# Patient Record
Sex: MALE | Race: WHITE | NOT HISPANIC OR LATINO | Employment: OTHER | URBAN - METROPOLITAN AREA
[De-identification: names, ages, dates, MRNs, and addresses within clinical notes are randomized per-mention and may not be internally consistent; named-entity substitution may affect disease eponyms.]

---

## 2017-08-17 ENCOUNTER — HOSPITAL ENCOUNTER (OUTPATIENT)
Dept: GENERAL RADIOLOGY | Facility: HOSPITAL | Age: 72
Discharge: HOME | End: 2017-08-17
Admitting: NURSE PRACTITIONER

## 2017-08-17 ENCOUNTER — OFFICE VISIT (OUTPATIENT)
Dept: NEUROSURGERY | Facility: CLINIC | Age: 72
End: 2017-08-17

## 2017-08-17 VITALS
BODY MASS INDEX: 34.07 KG/M2 | HEART RATE: 86 BPM | RESPIRATION RATE: 20 BRPM | WEIGHT: 274 LBS | HEIGHT: 75 IN | DIASTOLIC BLOOD PRESSURE: 78 MMHG | SYSTOLIC BLOOD PRESSURE: 140 MMHG

## 2017-08-17 DIAGNOSIS — S32.000A CLOSED COMPRESSION FRACTURE OF LUMBAR VERTEBRA, INITIAL ENCOUNTER (HCC): Primary | ICD-10-CM

## 2017-08-17 DIAGNOSIS — S32.000A CLOSED COMPRESSION FRACTURE OF LUMBAR VERTEBRA, INITIAL ENCOUNTER (HCC): ICD-10-CM

## 2017-08-17 DIAGNOSIS — S32.2XXK: ICD-10-CM

## 2017-08-17 PROCEDURE — 72100 X-RAY EXAM L-S SPINE 2/3 VWS: CPT

## 2017-08-17 PROCEDURE — 99203 OFFICE O/P NEW LOW 30 MIN: CPT | Performed by: NURSE PRACTITIONER

## 2017-08-17 NOTE — PROGRESS NOTES
Subjective   Patient ID: Kennedy Lin is a 71 y.o. male is being seen for consultation today at the request of Kelsie Rodriguez MD for L2 compression fracture. Patient's last imaging was a lumbar MRI 8/7 and presents unaccompanied.     History of Present Illness   Patient presents for evaluation and treatment recommendations of L2 fracture.  Patient had a fall on August 6, 2017 when he fell backwards off of a 6-7 foot platform.  He landed onto his feet and then onto his buttocks.  He had discomfort immediately, but not severe pain.  He took some Motrin.  The next day he was extremely stiff and in pain and unable to move easily.  He was seen in the emergency room and had relief of his pain with Dilaudid.  He was sent home with Norco and muscle relaxant.  Neither of which have given him much relief.  The significant pain is in his tailbone and his dramatically worsened by sitting.  It is relieved by standing.  H he has minimal low back pain.  He has a stiffness in his low back that is worse in the morning after lying down for long periods of time.  He denies any associated numbness, tingling, incontinence.  He has been wearing his lumbar brace given to him in the emergency room but not all of the time.    The following portions of the patient's history were reviewed and updated as appropriate: allergies, current medications, past family history, past medical history, past social history, past surgical history and problem list.    Review of Systems   Constitutional: Negative for fever.   HENT: Negative for sore throat, tinnitus and trouble swallowing.    Eyes: Negative for visual disturbance.   Respiratory: Negative for cough, shortness of breath and wheezing.    Cardiovascular: Negative for chest pain and palpitations.   Gastrointestinal: Negative for abdominal pain, nausea and vomiting.   Genitourinary: Negative for difficulty urinating and enuresis.   Musculoskeletal: Positive for back pain (coccyx pain).  Negative for gait problem.   Skin: Negative for rash.   Neurological: Negative for weakness and numbness.   Psychiatric/Behavioral: Negative for sleep disturbance.       Objective   Physical Exam   Constitutional: He is oriented to person, place, and time. He appears well-developed and well-nourished.   Large body habitus   Pulmonary/Chest: Effort normal.   Musculoskeletal:        Lumbar back: He exhibits bony tenderness (none at lumbar spine.  There is tenderness in the sacral region). He exhibits normal range of motion and no tenderness. Lacerations: with sitting.   Negative straight leg raise bilaterally    Wearing LSO brace   Neurological: He is alert and oriented to person, place, and time. He has normal strength. Gait normal.   Reflex Scores:       Patellar reflexes are 2+ on the right side and 2+ on the left side.       Achilles reflexes are 0 on the right side and 0 on the left side.  Psychiatric: He has a normal mood and affect.   pacing   Vitals reviewed.    Neurologic Exam     Mental Status   Oriented to person, place, and time.   Level of consciousness: alert  Knowledge: good.   Normal comprehension.     Motor Exam   Muscle bulk: increased  Overall muscle tone: normal    Strength   Strength 5/5 throughout.     Sensory Exam   Right leg light touch: normal  Left leg light touch: normal    Gait, Coordination, and Reflexes     Gait  Gait: normal    Reflexes   Right patellar: 2+  Left patellar: 2+  Right achilles: 0  Left achilles: 0       Able to heel and toe walk.  Able to squat to stand unassisted       Assessment/Plan   Independent Review of Radiographic Studies:    CT scan of the lumbar spine from [facility] dated August 7, 2017 was reviewed.  There is a superior endplate fracture of L2 approximately 20% loss of body height.  No retropulsion.  There is also fracture involving sacrococcygeal junction.    Medical Decision Making:    Patient with L2 and sacrococcygeal fractures after a fall  approximately 10 days ago.  The majority of his pain is in his coccyx.  He has a little pain but really just stiffness in his low back.  He has no tenderness to palpation.  He has no neurologic red flags.  I've asked him to repeat a lumbar x-ray today and I will review that as well as his CT scan with Dr. Sweeney tomorrow morning.  I doubt that there is any surgical intervention for his coccyx fracture, but we will refer to orthopedic surgery at Dr. Sweeney feels it is necessary.  With regard to the L2 fracture, he is not a surgical candidate for many reasons.  One being that he really has no significant pain in this area and 2 because this is a traumatic injury and does not appear to be related to osteoporosis.  His insurance is unlikely to cover this procedure in his case.  He should remain in the brace at all times except for when lying down at a 45° angle or less.  This was discussed with him.  He should not do any lifting pushing pulling greater than 20 pounds.  He should avoid bending and twisting.  We will see him back in one month with repeat x-rays.    Plan: Lumbar x-rays today.  Call patient with results.  Plan to see him in follow-up in one month with lumbar x-rays.  Continue brace.    Kennedy was seen today for back pain.    Diagnoses and all orders for this visit:    Closed compression fracture of lumbar vertebra, initial encounter  -     XR Spine Lumbar 2 or 3 View; Future    Closed fracture of coccyx with nonunion      Return in about 1 month (around 9/17/2017) for Follow-up with NP, with imaging.         Addendum: L2 fracture stable. Dr. Ma believes there to also be a stable L1 fracture. Dr. Sweeney states no particular treatment for coccyx fracture. If his pain persists- consider ortho referral by PCP or us if still present at f/u visit. Patient advised.

## 2017-09-15 DIAGNOSIS — S32.000A CLOSED COMPRESSION FRACTURE OF LUMBAR VERTEBRA, INITIAL ENCOUNTER (HCC): Primary | ICD-10-CM

## 2017-09-27 ENCOUNTER — OFFICE VISIT (OUTPATIENT)
Dept: NEUROSURGERY | Facility: CLINIC | Age: 72
End: 2017-09-27

## 2017-09-27 ENCOUNTER — HOSPITAL ENCOUNTER (OUTPATIENT)
Dept: GENERAL RADIOLOGY | Facility: HOSPITAL | Age: 72
Discharge: HOME | End: 2017-09-27
Admitting: NURSE PRACTITIONER

## 2017-09-27 VITALS
RESPIRATION RATE: 18 BRPM | HEART RATE: 84 BPM | DIASTOLIC BLOOD PRESSURE: 84 MMHG | WEIGHT: 262 LBS | BODY MASS INDEX: 32.58 KG/M2 | HEIGHT: 75 IN | SYSTOLIC BLOOD PRESSURE: 142 MMHG

## 2017-09-27 DIAGNOSIS — S32.000G: Primary | ICD-10-CM

## 2017-09-27 DIAGNOSIS — S32.000A CLOSED COMPRESSION FRACTURE OF LUMBAR VERTEBRA, INITIAL ENCOUNTER (HCC): ICD-10-CM

## 2017-09-27 PROCEDURE — 99213 OFFICE O/P EST LOW 20 MIN: CPT | Performed by: NURSE PRACTITIONER

## 2017-09-27 PROCEDURE — 72100 X-RAY EXAM L-S SPINE 2/3 VWS: CPT

## 2017-09-27 RX ORDER — MELOXICAM 15 MG/1
TABLET ORAL
COMMUNITY
Start: 2017-09-08 | End: 2021-06-08 | Stop reason: ALTCHOICE

## 2017-09-27 NOTE — PROGRESS NOTES
Subjective   Patient ID: Kennedy Lin is a 72 y.o. male is here today for follow-up for back pain. Patient presents unaccompanied.     History of Present Illness   Patient resents for follow-up of L2 vertebral compression fracture.  This was sustained during a fall in August 2017.  He's been monitored with serial imaging and treated with conservative bracing.  At the time of the fall he also sustained a sacral coccygeal fracture. He has had improvement in pain with new coccyx pillow. He has a bit of soreness in lower back but mainly muscular.  No leg pain or numbness. He states that he is wearing his brace.     The following portions of the patient's history were reviewed and updated as appropriate: allergies, current medications and problem list.    Review of Systems   Musculoskeletal: Positive for back pain (improved).   Neurological: Negative for weakness and numbness.   Psychiatric/Behavioral: Negative for sleep disturbance.       Objective   Physical Exam   Constitutional: He appears well-developed and well-nourished.   Musculoskeletal:        Lumbar back: He exhibits no tenderness, no bony tenderness and no deformity.   LSO brace   Neurological: He has normal strength. Gait normal.   Vitals reviewed.    Neurologic Exam     Motor Exam   Muscle bulk: normal  Right leg tone: normal  Left leg tone: normal    Strength   Strength 5/5 throughout.     Gait, Coordination, and Reflexes     Gait  Gait: normal      Assessment/Plan   Independent Review of Radiographic Studies:    Lumbar x-rays from [facility] dated September 27, 2017 are compared with prior x-ray on August 17, 2017.  There does appear to be progression of the L2 compression deformity as compared with the prior imaging.    Medical Decision Making:    Patient presents for ongoing follow-up of L2 compression fracture.  The recent x-ray shows some increased loss of height, but the patient is improving with regard to back discomfort.  He has  no neurologic radicular symptoms or any abnormalities on exam.  I recommended that he continue in his brace at this time as well as continue activity restrictions including no lifting pushing pulling greater than 15 pounds and no bending or twisting.  We will repeat his x-ray in one month.  If his x-ray stabilized then we will consider weaning the brace that time.    Plan: Return to office one month with lumbar x-rays.  Continue in brace.    Kennedy was seen today for back pain.    Diagnoses and all orders for this visit:    Closed compression fracture of lumbar vertebra, with delayed healing, subsequent encounter  -     XR Spine Lumbar 2 or 3 View; Future      Return in about 1 month (around 10/27/2017).

## 2017-11-09 ENCOUNTER — HOSPITAL ENCOUNTER (OUTPATIENT)
Dept: GENERAL RADIOLOGY | Facility: HOSPITAL | Age: 72
Discharge: HOME | End: 2017-11-09
Admitting: NURSE PRACTITIONER

## 2017-11-09 ENCOUNTER — OFFICE VISIT (OUTPATIENT)
Dept: NEUROSURGERY | Facility: CLINIC | Age: 72
End: 2017-11-09

## 2017-11-09 VITALS
SYSTOLIC BLOOD PRESSURE: 132 MMHG | DIASTOLIC BLOOD PRESSURE: 86 MMHG | HEART RATE: 82 BPM | BODY MASS INDEX: 32.95 KG/M2 | HEIGHT: 75 IN | RESPIRATION RATE: 16 BRPM | WEIGHT: 265 LBS

## 2017-11-09 DIAGNOSIS — S32.000G: ICD-10-CM

## 2017-11-09 DIAGNOSIS — S32.020D CLOSED COMPRESSION FRACTURE OF L2 LUMBAR VERTEBRA WITH ROUTINE HEALING, SUBSEQUENT ENCOUNTER: Primary | ICD-10-CM

## 2017-11-09 PROCEDURE — 99213 OFFICE O/P EST LOW 20 MIN: CPT | Performed by: NURSE PRACTITIONER

## 2017-11-09 PROCEDURE — 72100 X-RAY EXAM L-S SPINE 2/3 VWS: CPT

## 2017-11-09 RX ORDER — GLIPIZIDE 10 MG/1
TABLET, FILM COATED, EXTENDED RELEASE ORAL
COMMUNITY
Start: 2017-11-07 | End: 2020-09-14

## 2017-11-09 NOTE — PROGRESS NOTES
Subjective   Patient ID: Kennedy Lin is a 72 y.o. male is here today for follow-up L2 fracture. Patient presents unaccompanied.     History of Present Illness   Patient presents for ongoing follow-up of L2 vertebral compression fracture.  He has been in a brace since fall in August 2017. He has had intermittent right low back pain since his last visit. He denies leg pain. He states he has been compliant with brace and restrictions.     The following portions of the patient's history were reviewed and updated as appropriate: allergies, current medications and problem list.    Review of Systems   Musculoskeletal: Positive for back pain.   Neurological: Negative for weakness and numbness.   Psychiatric/Behavioral: Positive for sleep disturbance.       Objective   Physical Exam   Constitutional: He appears well-developed and well-nourished.   Pulmonary/Chest: Effort normal.   Musculoskeletal:        Lumbar back: He exhibits tenderness (left lateral low back.). He exhibits no bony tenderness and no deformity.   LSO brace   Neurological: He is alert. He has normal strength. Gait normal.   Reflex Scores:       Patellar reflexes are 1+ on the right side and 1+ on the left side.       Achilles reflexes are 0 on the right side and 0 on the left side.  Skin: Skin is warm and dry.   Psychiatric: He has a normal mood and affect. His behavior is normal.   Vitals reviewed.    Neurologic Exam     Mental Status   Level of consciousness: alert    Motor Exam   Muscle bulk: normal  Right leg tone: normal  Left leg tone: normal    Strength   Strength 5/5 throughout.     Gait, Coordination, and Reflexes     Gait  Gait: normal    Reflexes   Right patellar: 1+  Left patellar: 1+  Right achilles: 0  Left achilles: 0      Assessment/Plan   Independent Review of Radiographic Studies:    Lumbar x-rays from [facility] dated November 9, 2017 were reviewed.  These reveal stability of the L2 compression fracture as compared with  the prior imaging.    Medical Decision Making:    Patient presents for ongoing follow-up of L2 compression fracture.  This is stable on recent imaging.  No further compression notice.  Patient states he's been compliant with his brace.  He does have some intermittent left low back discomfort that is in the musculature.  There is no tenderness to palpation or percussion over the midline spinous processes.  There are no neurologic red flags on exam.    Patient may begin to wean his brace.  He should slowly increase his activity.  He should avoid any heavy lifting until he is out of his brace with no significant worsening in back discomfort.  He should use heat and NSAIDs for his current discomfort.  He should contact us if he has any acute changes in his back pain.  Return to office when necessary.    Plan: Return to office when necessary.    Kennedy was seen today for back pain.    Diagnoses and all orders for this visit:    Closed compression fracture of L2 lumbar vertebra with routine healing, subsequent encounter      Return if symptoms worsen or fail to improve.

## 2020-09-14 RX ORDER — TELMISARTAN 80 MG/1
TABLET ORAL
Qty: 90 TABLET | Refills: 0 | Status: SHIPPED | OUTPATIENT
Start: 2020-09-14 | End: 2020-11-04 | Stop reason: SDUPTHER

## 2020-09-14 RX ORDER — GLIPIZIDE 10 MG/1
TABLET, FILM COATED, EXTENDED RELEASE ORAL
Qty: 90 TABLET | Refills: 0 | Status: SHIPPED | OUTPATIENT
Start: 2020-09-14 | End: 2020-11-04 | Stop reason: SDUPTHER

## 2020-10-09 PROBLEM — E66.812 CLASS 2 SEVERE OBESITY DUE TO EXCESS CALORIES WITH SERIOUS COMORBIDITY AND BODY MASS INDEX (BMI) OF 35.0 TO 35.9 IN ADULT: Status: ACTIVE | Noted: 2020-10-09

## 2020-10-09 PROBLEM — E55.9 VITAMIN D DEFICIENCY: Status: ACTIVE | Noted: 2020-10-09

## 2020-10-09 PROBLEM — Z72.0 CHEWS TOBACCO: Status: ACTIVE | Noted: 2020-10-09

## 2020-10-09 PROBLEM — J30.1 SEASONAL ALLERGIC RHINITIS DUE TO POLLEN: Status: ACTIVE | Noted: 2020-10-09

## 2020-10-09 PROBLEM — E66.01 CLASS 2 SEVERE OBESITY DUE TO EXCESS CALORIES WITH SERIOUS COMORBIDITY AND BODY MASS INDEX (BMI) OF 35.0 TO 35.9 IN ADULT: Status: ACTIVE | Noted: 2020-10-09

## 2020-10-09 PROBLEM — Z87.81 HISTORY OF COMPRESSION FRACTURE OF SPINE: Status: ACTIVE | Noted: 2020-10-09

## 2020-10-09 PROBLEM — I10 BENIGN ESSENTIAL HYPERTENSION: Status: ACTIVE | Noted: 2020-10-09

## 2020-10-09 PROBLEM — Z85.820 HISTORY OF MELANOMA: Status: ACTIVE | Noted: 2020-10-09

## 2020-10-09 PROBLEM — E11.9 TYPE 2 DIABETES MELLITUS WITHOUT COMPLICATION, WITHOUT LONG-TERM CURRENT USE OF INSULIN: Status: ACTIVE | Noted: 2020-10-09

## 2020-10-20 PROBLEM — E78.2 MIXED HYPERLIPIDEMIA: Status: ACTIVE | Noted: 2020-10-20

## 2020-10-28 ENCOUNTER — TELEPHONE (OUTPATIENT)
Dept: INTERNAL MEDICINE | Facility: CLINIC | Age: 75
End: 2020-10-28

## 2020-10-28 NOTE — TELEPHONE ENCOUNTER
PT CALLED STATING HE HAS AN APPT TOMORROW 10/29 FOR A WELLNESS EXAM AND HAS NOT RECEIVED BLOOD WORK BEFORE APPT. DOES PT NEED TO RECEIVE BLOOD WORK FIRST AND RESCHEDULE..     PLEASE ADVISE     LOST CONNECTION WITH PT BEFORE ABLE TO RECEIVE CALL BACK NUMBER

## 2020-11-03 ENCOUNTER — TELEPHONE (OUTPATIENT)
Dept: INTERNAL MEDICINE | Facility: CLINIC | Age: 75
End: 2020-11-03

## 2020-11-03 ENCOUNTER — OFFICE VISIT (OUTPATIENT)
Dept: INTERNAL MEDICINE | Facility: CLINIC | Age: 75
End: 2020-11-03

## 2020-11-03 VITALS
HEIGHT: 75 IN | OXYGEN SATURATION: 97 % | TEMPERATURE: 98.4 F | BODY MASS INDEX: 35.19 KG/M2 | DIASTOLIC BLOOD PRESSURE: 78 MMHG | HEART RATE: 70 BPM | WEIGHT: 283 LBS | RESPIRATION RATE: 16 BRPM | SYSTOLIC BLOOD PRESSURE: 130 MMHG

## 2020-11-03 DIAGNOSIS — E55.9 VITAMIN D DEFICIENCY: ICD-10-CM

## 2020-11-03 DIAGNOSIS — J30.1 SEASONAL ALLERGIC RHINITIS DUE TO POLLEN: ICD-10-CM

## 2020-11-03 DIAGNOSIS — E78.2 MIXED HYPERLIPIDEMIA: ICD-10-CM

## 2020-11-03 DIAGNOSIS — Z85.820 HISTORY OF MELANOMA: ICD-10-CM

## 2020-11-03 DIAGNOSIS — E66.01 CLASS 2 SEVERE OBESITY DUE TO EXCESS CALORIES WITH SERIOUS COMORBIDITY AND BODY MASS INDEX (BMI) OF 35.0 TO 35.9 IN ADULT (HCC): ICD-10-CM

## 2020-11-03 DIAGNOSIS — E11.9 TYPE 2 DIABETES MELLITUS WITHOUT COMPLICATION, WITHOUT LONG-TERM CURRENT USE OF INSULIN (HCC): ICD-10-CM

## 2020-11-03 DIAGNOSIS — Z72.0 CHEWS TOBACCO: ICD-10-CM

## 2020-11-03 DIAGNOSIS — I10 BENIGN ESSENTIAL HYPERTENSION: Primary | ICD-10-CM

## 2020-11-03 PROCEDURE — G0439 PPPS, SUBSEQ VISIT: HCPCS | Performed by: INTERNAL MEDICINE

## 2020-11-03 PROCEDURE — 99214 OFFICE O/P EST MOD 30 MIN: CPT | Performed by: INTERNAL MEDICINE

## 2020-11-03 PROCEDURE — 96160 PT-FOCUSED HLTH RISK ASSMT: CPT | Performed by: INTERNAL MEDICINE

## 2020-11-03 NOTE — PATIENT INSTRUCTIONS
-Diet, low carbohydrate, high-protein (diets that are low in carbohydrates and high in protein are very popular for weight loss)  -Exercise 30 to 45 minutes, 4-5 times per week  -Giving encouragement to exercise (we encourage all of her patients to exercise regularly 30 minutes of exercise 5 or more days per week)  -Special dietary instruction-we recommend you modify your diet to achieve and maintain a healthy weight.  Being overweight may increase your risk for developing health problems such as diabetes, heart disease and cancer.  Avoid high fat foods and eat a balanced diet rich in fruits and vegetables.  The combination of a reduced calorie diet and increased physical activity is recommended.    Diabetes handout:    Recommended fasting blood glucose     Recommend glucose 2 hours after meals less than 180    Symptoms of low blood glucose:  Confusion Dizziness feeling shaky Hunger Headache   Irritability Sweating Trembling Weakness Anxiety  Pale skin pounding heart    If your blood glucose is low, follow the steps below:  Follow the 15-15 rule: Eat or drink something from the list below equal to 15 g of carbohydrates  Rest for 15 minutes, then recheck your blood glucose.  If it is still low, (below 70), repeat step 1 above.  If your next meal is more than 1 hour away, you will need to eat 1 carbohydrate choice as a snack to keep your blood glucose from going low again.  If you cannot figure out why you have low blood glucose, call the office or go to the emergency room, as your medication may need to be adjusted.  Always carry something with you to treat an insulin reaction.  Use food from the list below.    Foods equal to 1 carbohydrate choice (15 g of carbohydrates):  3 glucose tablets or 4 dextrose tablets  4 ounces of fruit juice  5 to 6 ounces (approximately one half can) of regular soda such as a Coke or Pepsi  7-8 gummy or regular lifesavers  1 tablespoon of sugar or jelly.    If you have type 1  diabetes and you do not take care of low blood glucose, you may pass out.  If you do, a drug called glucagon should be injected into your skin, like you do with insulin.  This can be done by a family member or a friend who has been taught how to do it.  Since glucagon may cause you to vomit, you should be placed on your side when the injection is given.  If no one knows how to give the injection, you should be taken to the hospital.    You should awaken 10 minutes after the glucagon is injected.  If you do not, you should be taken at once to the hospital.    Cardiovascular risk in diabetic patients.    The presence of diabetes is associated with increased cardiovascular risk, particularly among women.  Patients with diabetes have a 2-4 times increased risk for cardiovascular disease, with more than two thirds of patients with diabetes eventually dying of heart disease.  The risk for stroke is 1.8-1.6 fold in diabetics.  Patients with diabetes are more likely to have undiagnosed coronary artery disease and have worse outcomes when hospitalized for other cardiovascular conditions, such as heart failure.    Hemoglobin A1c    Your blood sugar levels vary from minute to minute an hour to hour.  That is why you must test your blood sugar at home at certain times of the day.  You may test before and after meals, before and after exercise and whenever there are interruptions to your usual daily routine (travel, illness, stress, etc.).  However, you cannot test your blood sugar all of the time!  So, how can you know what your overall level of control is?    The hemoglobin A1c (HbA1C) test is like a batting average.  For example, kristopher Bruno was able to hit home runs sometimes and strike out at others.  However, his overall batting average showed that he was a great hitter most of the time!  Your hemoglobin A1c let you know how well you have controlled your blood sugar on the average for the last 3 months.  The chart below  compares the results of a hemoglobin A1c to an average daily blood sugar range.    Hemoglobin A1c      average blood sugar range for the last 3 months.  4.0 to 6.0%        mg/deciliter-normal  6.1 to 7.0%       120-150 mg/deciliter  7.1 to 8.0%       151-180 mg/deciliter  8.1 to 9.0%       181-210 mg/deciliter  9.1 to 10%       211-240 mg/deciliter  10.1 to 11%       241-270 mg/deciliter  11.1 to 12%       271-300 mg/deciliter  12.1% to 13%       301-330 mg/deciliter  13.1% to 14%       331-360 mg/deciliter  Greater than 14%      greater than 360 mg/deciliter    How does hemoglobin A1c work?  The hemoglobin A1c measures the amount of sugar that attaches to protein in your red blood cells.  Your red blood cells live for ~2 to 3 months, so this test shows your average blood sugar levels during this time.  The greater the amount of sugar in your blood and the longer it is high, the more sugar that will attached to those red blood cells.

## 2020-11-03 NOTE — TELEPHONE ENCOUNTER
glipizide (GLUCOTROL XL) 10 MG 24 hr tablet    telmisartan (MICARDIS) 80 MG tablet    ATORVASTATIN 10 MG/ 1 A DAY     HE IS ALSO TAKING COQ10    PLEASE ADVISE THESE ARE THE MEDICATIONS HE IS ON.    [phone]

## 2020-11-03 NOTE — PROGRESS NOTES
The ABCs of the Annual Wellness Visit  Subsequent Medicare Wellness Visit    Chief Complaint   Patient presents with   • Medicare Wellness-subsequent       Subjective   History of Present Illness:  Kennedy Lin is a 75 y.o. male who presents for a Subsequent Medicare Wellness Visit.  This is a 75-year-old gentleman with a past medical history significant for benign essential hypertension, diabetes mellitus type 2, hyperlipidemia, history of melanoma, vitamin D deficiency who presents for a wellness exam as well as a follow-up on labs and medications.  The patient is currently not experiencing any side effects or problems with his current medication.  Patient is getting regular exercise playing golf at least 1 time per week but no scheduled exercise.  He currently does not check blood glucose levels.  He is concerned about an area on the right side of his abdomen and was unsure if this could possibly be a hernia, he states that he has noted it for probably the last 4 to 6 weeks and has some mild discomfort in that region when he is lifting objects.  He denies any chest pain, no shortness of air or heart palpitations, no heartburn or reflux symptoms.  No other voiced complaints.  The patient has not been hospitalized in the last year.  He does not take a daily aspirin.  He declines discussion regarding the advanced directive.  The patient is unsure when he last had a colonoscopy completed.  He only drinks alcohol socially and this is a rare.  No concerns about falling.  Glaucoma screen was completed in March of this year.  He has had an influenza vaccination.  He has had some mild issues with hearing but denies any urinary incontinence or depression.  He does chew tobacco and is aware of the increased risk.    HEALTH RISK ASSESSMENT    Recent Hospitalizations:  No hospitalization(s) within the last year.    Current Medical Providers:  Patient Care Team:  Kelsie Rodriguez MD as PCP - General (Internal  Medicine)    Smoking Status:  Social History     Tobacco Use   Smoking Status Never Smoker   Smokeless Tobacco Never Used       Alcohol Consumption:  Social History     Substance and Sexual Activity   Alcohol Use No       Depression Screen:   PHQ-2/PHQ-9 Depression Screening 11/3/2020   Little interest or pleasure in doing things 0   Feeling down, depressed, or hopeless 0   Trouble falling or staying asleep, or sleeping too much 0   Feeling tired or having little energy 0   Poor appetite or overeating 0   Feeling bad about yourself - or that you are a failure or have let yourself or your family down 0   Trouble concentrating on things, such as reading the newspaper or watching television 0   Moving or speaking so slowly that other people could have noticed. Or the opposite - being so fidgety or restless that you have been moving around a lot more than usual 0   Thoughts that you would be better off dead, or of hurting yourself in some way 0   Total Score 0   If you checked off any problems, how difficult have these problems made it for you to do your work, take care of things at home, or get along with other people? Not difficult at all       Fall Risk Screen:  STEADI Fall Risk Assessment was completed, and patient is at LOW risk for falls.Assessment completed on:11/3/2020    Health Habits and Functional and Cognitive Screening:  Functional & Cognitive Status 11/3/2020   Do you have difficulty preparing food and eating? No   Do you have difficulty bathing yourself, getting dressed or grooming yourself? No   Do you have difficulty using the toilet? No   Do you have difficulty moving around from place to place? No   Do you have trouble with steps or getting out of a bed or a chair? No   Current Diet Well Balanced Diet   Dental Exam Up to date   Eye Exam Up to date   Exercise (times per week) 3 times per week   Current Exercises Include Walking        Exercise Comment GOLF   Do you need help using the phone?  No   Are  you deaf or do you have serious difficulty hearing?  No   Do you need help with transportation? No   Do you need help shopping? No   Do you need help preparing meals?  No   Do you need help with housework?  No   Do you need help with laundry? No   Do you need help taking your medications? No   Do you need help managing money? No   Do you ever drive or ride in a car without wearing a seat belt? No   Have you felt unusual stress, anger or loneliness in the last month? No   Who do you live with? Spouse   If you need help, do you have trouble finding someone available to you? No   Have you been bothered in the last four weeks by sexual problems? No   Do you have difficulty concentrating, remembering or making decisions? No         Does the patient have evidence of cognitive impairment? No    Asprin use counseling:Does not need ASA (and currently is not on it)    Age-appropriate Screening Schedule:  Refer to the list below for future screening recommendations based on patient's age, sex and/or medical conditions. Orders for these recommended tests are listed in the plan section. The patient has been provided with a written plan.    Health Maintenance   Topic Date Due   • URINE MICROALBUMIN  1945   • COLONOSCOPY  1945   • TDAP/TD VACCINES (1 - Tdap) 08/23/1965   • ZOSTER VACCINE (1 of 2) 08/23/1996   • DIABETIC FOOT EXAM  08/17/2017   • DIABETIC EYE EXAM  08/17/2017   • INFLUENZA VACCINE  08/01/2020   • HEMOGLOBIN A1C  04/30/2021   • LIPID PANEL  10/30/2021          The following portions of the patient's history were reviewed and updated as appropriate: allergies, current medications, past family history, past medical history, past social history, past surgical history and problem list.    Outpatient Medications Prior to Visit   Medication Sig Dispense Refill   • glipizide (GLUCOTROL XL) 10 MG 24 hr tablet TAKE ONE TABLET BY MOUTH DAILY 90 tablet 0   • meloxicam (MOBIC) 15 MG tablet      • telmisartan  "(MICARDIS) 80 MG tablet TAKE ONE TABLET BY MOUTH DAILY 90 tablet 0   • VALSARTAN PO Take  by mouth.       No facility-administered medications prior to visit.        Patient Active Problem List   Diagnosis   • Closed compression fracture of lumbar vertebra (CMS/HCC)   • Closed fracture of coccyx with nonunion   • Benign essential hypertension   • Type 2 diabetes mellitus without complication, without long-term current use of insulin (CMS/[facility])   • History of melanoma   • Seasonal allergic rhinitis due to pollen   • History of compression fracture of spine   • Chews tobacco   • Class 2 severe obesity due to excess calories with serious comorbidity and body mass index (BMI) of 35.0 to 35.9 in adult (CMS/[facility])   • Vitamin D deficiency   • Mixed hyperlipidemia       Advanced Care Planning:  ACP discussion was declined by the patient. Patient does not have an advance directive, declines further assistance.    Review of Systems    Compared to one year ago, the patient feels his physical health is the same.  Compared to one year ago, the patient feels his mental health is the same.    Reviewed chart for potential of high risk medication in the elderly: yes  Reviewed chart for potential of harmful drug interactions in the elderly:yes    Objective         Vitals:    11/03/20 1029   BP: 130/78   BP Location: Left arm   Patient Position: Sitting   Cuff Size: Large Adult   Pulse: 70   Resp: 16   Temp: 98.4 °F (36.9 °C)   TempSrc: Temporal   SpO2: 97%   Weight: 128 kg (283 lb)   Height: 190.5 cm (75\")       Body mass index is 35.37 kg/m².  Discussed the patient's BMI with him. The BMI is above average; BMI management plan is completed.    Physical Exam  Constitutional:  The patient appears well-developed and well-nourished and is in no acute distress.  Head and Face:  Head and face are symmetric, normocephalic, and atraumatic.  Ears, Nose, Mouth and Throat:  Lips, teeth, and gums appear healthy with good dentition.  The oropharynx " is normal with no erythema, edema, exudate or lesions.  Neck:  The neck was normal in appearance and supple.  The trachea was midline.  Exam of the thyroid reveals no thyromegaly or masses.  Pulmonary:  Respiratory effort is normal.  Lungs are clear to auscultation bilaterally.  Cardiovascular:  The heart rate was normal.  The rhythm was regular.  Heart sounds show a normal S1, a normal S2, no gallops, rubs or murmurs.  Pedal pulses are +2/4 bilaterally.  Examination of the extremities shows no edema.  FOOT EXAM:  PT/DP +2/4 bilaterally; no swelling, erythema or tenderness; good capillary refill; normal sensory-vibration sense with normal monofilament.  No lesions on feet bilaterally.  Abdomen: Protrudent, soft, nontender, no palpable defect to identify hernia, positive bowel sounds in all 4 quadrants  Lymphatic:  There is no palpable lymphadenopathy of the neck.  Musculoskeletal:  Gait and station are within normal limits.  Skin:  Skin and subcutaneous tissues are normal without rashes or lesions.  Psychiatric:  Patient's judgment and insight are unimpaired.  Patient is oriented to person, place and time.  Patient's mood and affect are normal.  Lab Results   Component Value Date     (H) 10/30/2020    CHLPL 120 10/30/2020    TRIG 153 (H) 10/30/2020    HDL 26 (L) 10/30/2020    LDL 67 10/30/2020    VLDL 27 10/30/2020    HGBA1C 6.5 (H) 10/30/2020        Assessment/Plan   Medicare Risks and Personalized Health Plan  CMS Preventative Services Quick Reference  Advance Directive Discussion  Colon Cancer Screening  Depression/Dysphoria  Diabetic Lab Screening   Fall Risk  Glaucoma Risk  Hearing Problem  Immunizations Discussed/Encouraged (specific immunizations; Influenza )  Obesity/Overweight   Tobacco Use/Dependance (use dotphrase .tobaccocessation for documentation)    The above risks/problems have been discussed with the patient.  Pertinent information has been shared with the patient in the After Visit  Summary.  Follow up plans and orders are seen below in the Assessment/Plan Section.    Diagnoses and all orders for this visit:    1. Benign essential hypertension (Primary)    2. Chews tobacco    3. Class 2 severe obesity due to excess calories with serious comorbidity and body mass index (BMI) of 35.0 to 35.9 in adult (CMS/[facility])    4. History of melanoma  -     Ambulatory Referral to Dermatology    5. Mixed hyperlipidemia    6. Seasonal allergic rhinitis due to pollen    7. Type 2 diabetes mellitus without complication, without long-term current use of insulin (CMS/[facility])    8. Vitamin D deficiency    #1.  Benign essential hypertension-blood pressure is well controlled, would recommend continuing current treatment.  2.  Diabetes mellitus type 2-fasting blood sugar is 127 with an A1c of 6.5, patient is currently on glipizide extended release 10 mg once daily, would recommend continuing current treatment.  Ophthalmology exam-; monofilament completed today; microalbumin completed today.  3.  Hyperlipidemia-LDL goal is less than 70, total cholesterol 120, HDL 26, triglycerides 153 and LDL 67, patient is currently not on treatment for cholesterol, would recommend that he continue to work on diet and exercise.  4.  History of melanoma-recommend follow-up with dermatology, will give a referral.  5.  Chews tobacco-we discussed the increased risk for oral cancer, declines cessation  6.  Vitamin D deficiency-level is 30.4, would recommend vitamin D3 1000 international units daily.  7.  Obesity-BMI is 35.37 with a weight of 283 pounds, would recommend diet and exercise to obtain a BMI less than 30 and ideally less than 25.    Would recommend a 3-month follow-up with lab-CMP, A1c  Follow Up:  No follow-ups on file.     An After Visit Summary and PPPS were given to the patient.

## 2020-11-04 RX ORDER — TELMISARTAN 80 MG/1
80 TABLET ORAL DAILY
Qty: 90 TABLET | Refills: 1 | Status: SHIPPED | OUTPATIENT
Start: 2020-11-04 | End: 2021-08-06 | Stop reason: SDUPTHER

## 2020-11-04 RX ORDER — ATORVASTATIN CALCIUM 10 MG/1
10 TABLET, FILM COATED ORAL DAILY
Qty: 90 TABLET | Refills: 1 | Status: SHIPPED | OUTPATIENT
Start: 2020-11-04 | End: 2021-08-06

## 2020-11-04 RX ORDER — TELMISARTAN 80 MG/1
80 TABLET ORAL DAILY
Qty: 90 TABLET | Refills: 1 | Status: SHIPPED | OUTPATIENT
Start: 2020-11-04 | End: 2020-11-04 | Stop reason: SDUPTHER

## 2020-11-04 RX ORDER — GLIPIZIDE 10 MG/1
10 TABLET, FILM COATED, EXTENDED RELEASE ORAL DAILY
Qty: 90 TABLET | Refills: 1 | Status: SHIPPED | OUTPATIENT
Start: 2020-11-04 | End: 2020-11-04 | Stop reason: SDUPTHER

## 2020-11-04 RX ORDER — GLIPIZIDE 10 MG/1
10 TABLET, FILM COATED, EXTENDED RELEASE ORAL DAILY
Qty: 90 TABLET | Refills: 1 | Status: SHIPPED | OUTPATIENT
Start: 2020-11-04 | End: 2021-08-06 | Stop reason: SDUPTHER

## 2020-11-04 RX ORDER — ATORVASTATIN CALCIUM 10 MG/1
10 TABLET, FILM COATED ORAL DAILY
COMMUNITY
End: 2020-11-04 | Stop reason: SDUPTHER

## 2020-11-04 RX ORDER — ATORVASTATIN CALCIUM 10 MG/1
10 TABLET, FILM COATED ORAL DAILY
Qty: 90 TABLET | Refills: 1 | Status: SHIPPED | OUTPATIENT
Start: 2020-11-04 | End: 2020-11-04 | Stop reason: SDUPTHER

## 2021-04-02 ENCOUNTER — HOSPITAL ENCOUNTER (OUTPATIENT)
Dept: OTHER | Facility: HOSPITAL | Age: 76
Discharge: HOME | End: 2021-04-02
Attending: NURSE PRACTITIONER

## 2021-04-02 ENCOUNTER — OFFICE VISIT CONVERTED (OUTPATIENT)
Dept: FAMILY MEDICINE CLINIC | Age: 76
End: 2021-04-02
Attending: NURSE PRACTITIONER

## 2021-04-02 LAB
ALBUMIN SERPL-MCNC: 4.1 G/DL (ref 3.5–5)
ALBUMIN/GLOB SERPL: 1.3 {RATIO} (ref 1.4–2.6)
ALP SERPL-CCNC: 71 U/L (ref 56–155)
ALT SERPL-CCNC: 38 U/L (ref 10–40)
AMYLASE SERPL-CCNC: 35 U/L (ref 30–150)
ANION GAP SERPL CALC-SCNC: 17 MMOL/L (ref 8–19)
AST SERPL-CCNC: 29 U/L (ref 15–50)
BASOPHILS # BLD MANUAL: 0.04 10*3/UL (ref 0–0.2)
BASOPHILS NFR BLD MANUAL: 0.5 % (ref 0–3)
BILIRUB SERPL-MCNC: 0.55 MG/DL (ref 0.2–1.3)
BUN SERPL-MCNC: 15 MG/DL (ref 5–25)
BUN/CREAT SERPL: 14 {RATIO} (ref 6–20)
CALCIUM SERPL-MCNC: 9 MG/DL (ref 8.7–10.4)
CHLORIDE SERPL-SCNC: 99 MMOL/L (ref 99–111)
CONV CO2: 26 MMOL/L (ref 22–32)
CONV TOTAL PROTEIN: 7.3 G/DL (ref 6.3–8.2)
CREAT UR-MCNC: 1.06 MG/DL (ref 0.7–1.2)
DEPRECATED RDW RBC AUTO: 42.2 FL
EOSINOPHIL # BLD MANUAL: 0.06 10*3/UL (ref 0–0.7)
EOSINOPHIL NFR BLD MANUAL: 0.8 % (ref 0–7)
ERYTHROCYTE [DISTWIDTH] IN BLOOD BY AUTOMATED COUNT: 12.7 % (ref 11.5–14.5)
EST. AVERAGE GLUCOSE BLD GHB EST-MCNC: 160 MG/DL
GFR SERPLBLD BASED ON 1.73 SQ M-ARVRAT: >60 ML/MIN/{1.73_M2}
GLOBULIN UR ELPH-MCNC: 3.2 G/DL (ref 2–3.5)
GLUCOSE SERPL-MCNC: 132 MG/DL (ref 70–99)
GRANS (ABSOLUTE): 4.81 10*3/UL (ref 2–8)
GRANS: 66 % (ref 30–85)
HBA1C MFR BLD: 15.2 G/DL (ref 14–18)
HBA1C MFR BLD: 7.2 % (ref 3.5–5.7)
HCT VFR BLD AUTO: 46.8 % (ref 42–52)
IMM GRANULOCYTES # BLD: 0.02 10*3/UL (ref 0–0.54)
IMM GRANULOCYTES NFR BLD: 0.3 % (ref 0–0.43)
LIPASE SERPL-CCNC: 31 U/L (ref 5–51)
LYMPHOCYTES # BLD MANUAL: 1.71 10*3/UL (ref 1–5)
LYMPHOCYTES NFR BLD MANUAL: 9 % (ref 3–10)
MCH RBC QN AUTO: 29.1 PG (ref 27–31)
MCHC RBC AUTO-ENTMCNC: 32.5 G/DL (ref 33–37)
MCV RBC AUTO: 89.7 FL (ref 80–96)
MONOCYTES # BLD AUTO: 0.66 10*3/UL (ref 0.2–1.2)
OSMOLALITY SERPL CALC.SUM OF ELEC: 289 MOSM/KG (ref 273–304)
PLATELET # BLD AUTO: 194 10*3/UL (ref 130–400)
PMV BLD AUTO: 11 FL (ref 7.4–10.4)
POTASSIUM SERPL-SCNC: 3.8 MMOL/L (ref 3.5–5.3)
RBC # BLD AUTO: 5.22 10*6/UL (ref 4.7–6.1)
SODIUM SERPL-SCNC: 138 MMOL/L (ref 135–147)
VARIANT LYMPHS NFR BLD MANUAL: 23.4 % (ref 20–45)
WBC # BLD AUTO: 7.3 10*3/UL (ref 4.8–10.8)

## 2021-04-09 ENCOUNTER — HOSPITAL ENCOUNTER (OUTPATIENT)
Dept: OTHER | Facility: HOSPITAL | Age: 76
Discharge: HOME | End: 2021-04-09
Attending: NURSE PRACTITIONER

## 2021-04-26 ENCOUNTER — HOSPITAL ENCOUNTER (OUTPATIENT)
Dept: OTHER | Facility: HOSPITAL | Age: 76
Discharge: HOME | End: 2021-04-26
Attending: NURSE PRACTITIONER

## 2021-04-26 ENCOUNTER — OFFICE VISIT CONVERTED (OUTPATIENT)
Dept: GASTROENTEROLOGY | Facility: CLINIC | Age: 76
End: 2021-04-26
Attending: NURSE PRACTITIONER

## 2021-04-26 LAB
ALBUMIN SERPL-MCNC: 4 G/DL (ref 3.5–5)
ALBUMIN/GLOB SERPL: 1.3 {RATIO} (ref 1.4–2.6)
ALP SERPL-CCNC: 69 U/L (ref 56–155)
ALT SERPL-CCNC: 26 U/L (ref 10–40)
ANION GAP SERPL CALC-SCNC: 14 MMOL/L (ref 8–19)
AST SERPL-CCNC: 23 U/L (ref 15–50)
BILIRUB SERPL-MCNC: 0.46 MG/DL (ref 0.2–1.3)
BUN SERPL-MCNC: 13 MG/DL (ref 5–25)
BUN/CREAT SERPL: 10 {RATIO} (ref 6–20)
CALCIUM SERPL-MCNC: 9 MG/DL (ref 8.7–10.4)
CHLORIDE SERPL-SCNC: 98 MMOL/L (ref 99–111)
CONV CO2: 29 MMOL/L (ref 22–32)
CONV TOTAL PROTEIN: 7.2 G/DL (ref 6.3–8.2)
CREAT UR-MCNC: 1.27 MG/DL (ref 0.7–1.2)
FERRITIN SERPL-MCNC: 223 NG/ML (ref 30–300)
GFR SERPLBLD BASED ON 1.73 SQ M-ARVRAT: 55 ML/MIN/{1.73_M2}
GLOBULIN UR ELPH-MCNC: 3.2 G/DL (ref 2–3.5)
GLUCOSE SERPL-MCNC: 120 MG/DL (ref 70–99)
INR PPP: 1.21 (ref 2–3)
IRON SATN MFR SERPL: 25 % (ref 20–55)
IRON SERPL-MCNC: 81 UG/DL (ref 70–180)
OSMOLALITY SERPL CALC.SUM OF ELEC: 285 MOSM/KG (ref 273–304)
POTASSIUM SERPL-SCNC: 3.7 MMOL/L (ref 3.5–5.3)
PROTHROMBIN TIME: 12.9 S (ref 9.4–12)
SODIUM SERPL-SCNC: 137 MMOL/L (ref 135–147)
TIBC SERPL-MCNC: 322 UG/DL (ref 245–450)
TRANSFERRIN SERPL-MCNC: 225 MG/DL (ref 215–365)

## 2021-04-27 LAB
CONV HEPATITIS B SURFACE AG W CONFIRMATION RE: NEGATIVE
CONV IMMUNOGLOBULIN G (IGG): 1201 MG/DL (ref 603–1613)
CONV IMMUNOGLOBULIN M (IGM): 93 MG/DL (ref 15–143)
DEPRECATED MITOCHONDRIA M2 IGG SER-ACNC: <20 UNITS (ref 0–20)
HAV IGM SERPL QL IA: NEGATIVE
HBV CORE IGM SERPL QL IA: NEGATIVE
HCV AB SER DONR QL: <0.1 S/CO RATIO (ref 0–0.9)
IGA SERPL-MCNC: 301 MG/DL (ref 61–437)
PROT PATTERN SERPL IFE-IMP: NORMAL
SMOOTH MUSCLE F-ACTIN AB IGG: 6 UNITS (ref 0–19)

## 2021-04-28 ENCOUNTER — HOSPITAL ENCOUNTER (OUTPATIENT)
Dept: OTHER | Facility: HOSPITAL | Age: 76
Discharge: HOME | End: 2021-04-28
Attending: NURSE PRACTITIONER

## 2021-04-28 LAB
DSDNA AB SER-ACNC: NEGATIVE [IU]/ML
ENA AB SER IA-ACNC: NEGATIVE {RATIO}

## 2021-04-29 LAB — CONV NASH FIBROSURE: NORMAL

## 2021-04-30 LAB
A1AT SERPL-MCNC: 137 MG/DL (ref 101–187)
PHENOTYPE: NORMAL

## 2021-05-04 ENCOUNTER — OFFICE VISIT CONVERTED (OUTPATIENT)
Dept: SURGERY | Facility: CLINIC | Age: 76
End: 2021-05-04
Attending: SURGERY

## 2021-05-11 NOTE — H&P
"   History and Physical      Patient Name: Kennedy Lin   Patient ID: [id number]   Sex: Male   YOB: 1945    Primary Care Provider: Norma ARRINGTON   Referring Provider: Nomra ARRINGTON    Visit Date: May 4, 2021    Provider: Brando Tubbs MD   Location: [facility] [place]   Location Address: [address] [place], [place] [address]an [facility]  [address]  [place], [place]  [id number]   Location Phone: [phone]          Chief Complaint  · Outpatient History & Physical / Surgical Orders  · Hernia Consult      History Of Present Illness     Mr. Lin came in today for evaluation. He is a very nice gentleman who was referred with a right lower quadrant ventral hernia. He has not had a prior incision there but recently had a CT scan that showed a Spigelian hernia in the right lower quadrant that contained some unobstructed small bowel loops. He is feeling okay. He is eating okay and going to the bathroom okay. He does feel the bulge in the right lower abdomen.       Past Medical History  High blood pressure         Past Surgical History  Hernia; nose; Shoulder surgery; skin cancer removed         Medication List  glipizide 10 mg oral tablet; telmisartan 80 mg oral tablet         Allergy List  NO KNOWN DRUG ALLERGIES       Allergies Reconciled  Social History  Alcohol (Light); Tobacco (Former)         Vitals  Date Time BP Position Site L\R Cuff Size HR RR TEMP (F) WT  HT  BMI kg/m2 BSA m2 O2 Sat FR L/min FiO2 HC       05/04/2021 09:48 AM       16 97.3 279lbs 8oz 6'  2\" 35.89 2.57             Physical Examination  · Constitutional  o Appearance  o : well-nourished, well developed, alert, in no acute distress  · Head and Face  o Head  o :   § Inspection  § : atraumatic, normocephalic  · Neck  o Inspection/Palpation  o : supple, normal range of motion  · Respiratory  o Inspection of Chest  o : normal inspection  o Auscultation of Lungs  o : breath sounds normal, no distress, clear to ascultate " bilaterally  · Cardiovascular  o Heart  o :   § Auscultation of Heart  § : regular rate and rhythm, no murmur, gallop or rub  · Gastrointestinal  o Abdominal Examination  o : normal bowel sounds, non-tender, soft          Assessment  · Pre-Surgical Orders     V72.84  · Ventral hernia without obstruction or gangrene     553.20/K43.9       Very nice gentleman with a right lower quadrant Spigelian ventral hernia.       Plan  · Orders  o [facility] Pre-Op Covid-19 Screening (49450) - 553.20/K43.9 - 05/21/2021  o General Surgery Order (GENOR) - 553.20/K43.9 - 05/26/2021  · Medications  o Medications have been Reconciled  o Transition of Care or Provider Policy  · Instructions  o PLAN:  o Handouts Provided-Pre-Procedure Instructions including date and time and location of procedure.  o Surgical Facility: [facility]  o ****Surgical Orders****  o ****Patient Status****  o Outpatient  o RISK AND BENEFITS:  o Consent for surgery: Given these options, the patient has verbally expressed an understanding of the risks of surgery and finds these risks acceptable. We will proceed with surgery as soon as possible.  o Consult Anesthesia for any post-operative block, or any pain management procedure deemed necessary by the anestesiologist for adequate post-operative pain control.   o O.R. PREP: Per protocol  o SCD's preoperatively  o PLEASE SIGN PERMIT FOR: Robotic ventral hernia repair  o *__Kefzol 2 gram IV on call to OR.  o *___The above History and Physical Examination has been completed within 30 days of admission.  o Electronically Identified Patient Education Materials Provided Electronically     We will set him up for a robotic ventral hernia repair. I have described the procedure to him as well as the risks and benefits and he is agreeable to proceeding.             Electronically Signed by: Wendy Roberto-, -Author on May 4, 2021 01:13:59 PM  Electronically Co-signed by: Brando Tubbs MD -Reviewer on May 5,  2021 01:44:58 PM

## 2021-05-11 NOTE — H&P
"   History and Physical      Patient Name: Kennedy Lin   Patient ID: [id number]   Sex: Male   YOB: 1945    Primary Care Provider: Norma ARRINGTON   Referring Provider: Nroma ARRINGTON    Visit Date: April 26, 2021    Provider: NURY Reyes   Location: [facility] [place]   Location Address: [address] [place] [address]  [address] [place] [address]  [id number]          Chief Complaint  · Abdominal pain  · abnormal imaging  · Fatty Liver      History Of Present Illness  The patient is a 75 year old /White male who presents on referral from Norma ARRINGTON for a gastroenterology evaluation.      Mr. Lin presents today for abnormal imaging and abdominal swelling for the last several months. The abdominal swelling as been present for the last 3 months and has been persistent. Location is RLQ and is \"uncomfortable and in the way\". There is no change with the swelling due to anything specific and no correlation with meals. Drinks approx. 1-3 alcoholic drinks a weeks. Denies any unprofessional tattoos, hx of illicit drug usage, or hx of blood transfusion.     Denies heartburn, dysphagia, nausea, vomiting, change in appetite, or weight loss.     Bowel movement once daily, formed stool. Denies any hematochieza, melena, or family hx of colon cancer. Reports that he is in the process of doing a cologuard test for colon cancer screening.     Abdominal ultrasound 4/9/2021: Simple hepatic and renal cyst.  Hepatomegaly.  Liver measures 19.6 cm in length.  Increased liver echotexture, suspicious for hepatic steatosis.    CMP 4/2/2021: GFR greater than 60, alkaline phosphatase 71, AST 29, ALT 38, amylase 35, lipase 31, total bilirubin 0.55.  CBC 4/2/21: WBC 7.30, hemoglobin 15.20, hematocrit 36.8, platelets 194.       Past Medical History  High blood pressure         Medication List  glipizide 10 mg oral tablet; telmisartan 80 mg oral tablet         Allergy List  NO KNOWN DRUG " "ALLERGIES       Allergies Reconciled  Social History  Alcohol (Light); Tobacco (Former)         Review of Systems  · Constitutional  o Denies  o : chills, fever  · Eyes  o Denies  o : blurred vision, changes in vision  · Cardiovascular  o Denies  o : chest pain, syncope  · Respiratory  o Denies  o : shortness of breath, dry cough  · Gastrointestinal  o Admits  o : See HPI  · Genitourinary  o Denies  o : dysuria, blood in urine  · Integument  o Denies  o : rash, new skin lesions  · Neurologic  o Denies  o : altered mental status, tingling or numbness  · Musculoskeletal  o Denies  o : joint pain, limitation of motion  · Endocrine  o Denies  o : weight gain, weight loss  · Psychiatric  o Denies  o : anxiety, depression      Vitals  Date Time BP Position Site L\R Cuff Size HR RR TEMP (F) WT  HT  BMI kg/m2 BSA m2 O2 Sat FR L/min FiO2        04/26/2021 08:46 /72 Sitting    68 - R   275lbs 0oz 6'  2\" 35.31 2.55             Physical Examination  · Constitutional  o Appearance  o : well developed, well-nourished, in no acute distress  · Eyes  o Vision  o :   § Visual Fields  § : eyes move symmetrical in all directions  o Sclerae  o : anicteric  o Pupils and Irises  o : pupils equal and symmetrical  · Neck  o Inspection/Palpation  o : supple  · Respiratory  o Respiratory Effort  o : breathing unlabored  o Inspection of Chest  o : normal appearance, no retractions  o Auscultation of Lungs  o : clear to auscultation bilaterally  · Cardiovascular  o Heart  o :   § Auscultation of Heart  § : no murmurs, gallops or rubs  · Gastrointestinal  o Abdominal Examination  o : abdomen nontender to palpation, normal bowel sounds, tone normal without rigidity or guarding, distension of RLQ, abdomen scaphoid upon supine  o Digital Rectal Exam  o : deferred  · Lymphatic  o Neck  o : no palpable lymphadenopathy  · Skin and Subcutaneous Tissue  o General Inspection  o : without focal lesions; turgor is " normal  · Psychiatric  o General  o : Alert and oriented x3  o Mood and Affect  o : Mood and affect are appropriate to circumstances          Assessment  · Abdominal bloating     787.3/R14.0  · Right lower quadrant abdominal pain     789.03/R10.31  · Fatty liver     571.8/K76.0  · Hepatomegaly     789.1/R16.0      Plan  · Orders  o CMP [facility] (68163) - - 04/26/2021  o CT abdomen and pelvis with contrast (95433) - - 04/26/2021  o Iron + transferrin (TIBC, calculated % saturation) (33706) - - 04/26/2021  o Ferritin ser/plas (49293) - - 04/26/2021  o ELLIS (antinuclear antibody profile) by enzyme immunoassay (86817) - - 04/26/2021  o Anti-mitochondrial antibody assay (33703) - - 04/26/2021  o Anti-Smooth Muscle Antibody (ASMA) [facility] (00176) - - 04/26/2021  o Serum immunofixation electrophoresis (84313) - - 04/26/2021  o Alpha-1-Antitrypsin/Phenotype [facility] (55205, 62893) - - 04/26/2021  o PT (91773) - - 04/26/2021  o Acute hepatitis panel (HAV IgM, HbcAb IgM, HbsAg, HCV) (28448, 22960, 28033, 51441, 64570) - - 04/26/2021  o MAZARIEGOS Fibrosure [facility] (drawn at [facility] only and must be fasting 8 hours; requires HT and WT) (86401, 68167, 20617, 64843, 97136, 65316, 79627, 30123, 62865, 68863) - - 04/26/2021  · Medications  o Medications have been Reconciled  · Instructions  o Information given on current diagnoses.  o Lifestyle modifications discussed.  o Electronically Identified Patient Education Materials Provided Electronically  · Disposition  o 2 month f/u            Electronically Signed by: NURY Reyes -Author on April 26, 2021 09:12:11 AM

## 2021-05-14 VITALS
WEIGHT: 275 LBS | DIASTOLIC BLOOD PRESSURE: 72 MMHG | BODY MASS INDEX: 35.29 KG/M2 | SYSTOLIC BLOOD PRESSURE: 149 MMHG | HEART RATE: 68 BPM | HEIGHT: 74 IN

## 2021-05-18 NOTE — PROGRESS NOTES
Kennedy Lin  1945     Office/Outpatient Visit    Visit Date: Fri, Apr 2, 2021 12:50 pm    Provider: Norma Conti N.P. (Assistant: Merly Jones MA)    Location: [facility]        Electronically signed by Norma Conti N.P. on  04/03/2021 08:10:12 AM                             Subjective:        CC: Dale is a 75 year old White male.  This is his first visit to the clinic.  swelling on right side started about 3 months ago         HPI: Dale reports history of HTN. Reports average readings 140s/80s. He is on telmisartan 80 mg daily. Has been out of his medication for the past 1 -2 weeks. Does not need other medications refilled at this time.    Additinally, reports that he was told that he had prediabetes vs diabetes. He does not recall his A1C being over 6.5. He is on glipizide daily. Does not remember trying metformin in past. Reports A1C running 5.8 -6.0. He is also on atorvastatin. Thinks that he had lab work 3 months ago. To his knowledge kidney and liver function was normal.     He denies previous abdominal history. He is here today with c/o right sided abdominal swelling. First noticed about 6 months ago. Denies pain, nausea, vomiting, diarrhea, constipation, acid reflux. He does note having to use the bathroom to have bowel movement soon after eating. This has seemed to be a more recent finding.  He notes that he feels bloated at times. Also reports worsening symptoms when he does lifting when he is working at [employer]. Wonders if he may have abdominal hernia.          PHQ-9 Depression Screening: Completed form scanned and in chart; Total Score 0     ROS:     CONSTITUTIONAL:  Negative for chills and fever.      EYES:  Negative for blurred vision and eye drainage.      E/N/T:  Negative for ear pain and sore throat.      CARDIOVASCULAR:  Negative for chest pain and palpitations.      RESPIRATORY:  Negative for dyspnea and frequent wheezing.      GASTROINTESTINAL:   Positive for abdominal bloating and abdominal swelling.   Negative for abdominal pain, acid reflux symptoms, constipation, diarrhea or vomiting.      GENITOURINARY:  Negative for dysuria and polyuria.      NEUROLOGICAL:  Negative for dizziness and headaches.      HEMATOLOGIC/LYMPHATIC:  Negative for easy bruising and lymphadenopathy.      PSYCHIATRIC:  Negative for depression and suicidal thoughts.          Past Medical History / Family History / Social History:         Past Medical History: Dr Kelsie Rodriguez former PCP             PAST MEDICAL HISTORY         Hypertension     Prediabetes vs diabetes     Skin cancer: Basal cell;         PREVENTIVE HEALTH MAINTENANCE             COLORECTAL CANCER SCREENING: Never         Surgical History:         Rhinoplasty: X 2 for broken nose;     hiatal hernia;     right shoulder for torn rotator cuff;         Family History:     Father:  at age 81; Cause of death was brain tumor     Mother:  at age 80; Cause of death was 'old age'         Social History:     Occupation: Retired (Prior occupation: [employer] )     Marital Status:      Children: 2 children and 2 step-children         Tobacco/Alcohol/Supplements:     Last Reviewed on 2021 12:58 PM by Merly Jones    Tobacco: He has a past history of cigar smoking. Quit Date:           Alcohol: Frequency: Socially OCCASIONAL         Current Problems:     Essential (primary) hypertension    Abdominal distension (gaseous)    Prediabetes    Encounter for screening for malignant neoplasm of colon    Encounter for screening for depression        Immunizations:     Influenza, high dose seasonal 10/1/2020        Current Medications:     Last Reviewed on 2021 12:55 PM by Merly Jones    atorvastatin 10 mg oral tablet    glipiZIDE 10 mg oral Tablet, Extended Release 24 hr    telmisartan 80 mg tablet        Objective:        Vitals:         Current: 2021 1:01:58 PM    Wt: 281.4 lbsT:  97 F (temporal);  BP: 157/90 mm Hg (left arm, sitting);  P: 74 bpm (left arm (BP Cuff), sitting)        Repeat:     1:5:40 PM  BP:   160/86mm Hg (right arm, sitting, P-75) 1:50:29 PM  BP:   163/85mm Hg (left arm, sitting, p-72)     Exams:     PHYSICAL EXAM:     GENERAL: well developed, well nourished;  no apparent distress;     RESPIRATORY: normal respiratory rate and pattern with no distress; normal breath sounds with no rales, rhonchi, wheezes or rubs;     CARDIOVASCULAR: normal rate; rhythm is regular;     GASTROINTESTINAL: nontender; palpable area of bulging right lower quadrant; normal bowel sounds;     MUSCULOSKELETAL: normal gait;     NEUROLOGIC: mental status: alert and oriented x 3; GROSSLY INTACT     PSYCHIATRIC: appropriate affect and demeanor; normal speech pattern; normal thought and perception;         Assessment:         I10   Essential (primary) hypertension       R73.03   Prediabetes       R14.0   Abdominal distension (gaseous)       Z13.31   Encounter for screening for depression       Z12.11   Encounter for screening for malignant neoplasm of colon           ORDERS:         Meds Prescribed:       [Refilled] telmisartan 80 mg oral tablet [take one tablet once daily ], #90 (ninety) tablets, Refills: 0 (zero)         Radiology/Test Orders:       [id number]  Ultrasound, abdominal  (Send-Out)              Lab Orders:       [id number]  Oncology colorectal screening renay 10 DNA markers  (Send-Out)            04319  AMYS - [facility] Amylase, Serum  (Send-Out)            06023  BDCBC - [facility] CBC with 3 part diff  (Send-Out)            54296  COMP - H Comp. Metabolic Panel  (Send-Out)            03441  HPUBT - [facility] H.pylori Breath test  (Send-Out)            28516  LIP - [facility] Lipase, Serum  (Send-Out)            42206  A1CEG - [facility] Hemoglobin A1C  (Send-Out)              Other Orders:         Depression screen negative  (In-House)                      Plan:         Essential (primary) hypertensionBP elevated today. Has  been out of medications for about one week. Will refill current medication. Continue to monitor.           Prescriptions:       [Refilled] telmisartan 80 mg oral tablet [take one tablet once daily ], #90 (ninety) tablets, Refills: 0 (zero)         PrediabetesPt reports that he has prediabetes. He is however on medications for diabetes including glipizide, ARB, and statin. Checking A1C today and will request records. Will continue current medications.    LABORATORY:  Labs ordered to be performed today include HgbA1C.  [facility] Vaccines Flu and Pneumonia updated in Shot record     FOLLOW-UP: for after testing           Orders:       [id number]  A1CEG - [facility] Hemoglobin A1C  (Send-Out)              Abdominal distension (gaseous)Checking labs today. Discussed hernia vs lipoma. Will proceed with US.     LABORATORY:  Labs ordered to be performed today include amylase, CBC, Comprehensive metabolic panel, H. pylori breath test, and Lipase.      RADIOLOGY:  I have ordered an abdominal ultrasound to be done today.      RECOMMENDATIONS given include: Further recommendation to be given after test results are complete.            Orders:       [id number]  AMYS - H Amylase, Serum  (Send-Out)            46877  BDCBC - [facility] CBC with 3 part diff  (Send-Out)            14856  COMP - [facility] Comp. Metabolic Panel  (Send-Out)            31278  HPUBT - [facility] H.pylori Breath test  (Send-Out)            58510  LIP - H Lipase, Serum  (Send-Out)            40916  Ultrasound, abdominal  (Send-Out)              Encounter for screening for depression    [facility] PHQ-9 Depression Screening: Completed form scanned and in chart; Total Score 0; Negative Depression Screen           Orders:         Depression screen negative  (In-House)              Encounter for screening for malignant neoplasm of colonHas never had colorectal screening. Declines colonoscopy but agreeable to cologuard. He is aware that if positive colonoscopy will be indicated.          TESTS/PROCEDURES:  Will proceed with Cologuard to be performed/scheduled now.            Orders:       [id number]  Oncology colorectal screening renay 10 DNA markers  (Send-Out)                  Charge Capture:         Primary Diagnosis:     I10  Essential (primary) hypertension           Orders:      [id number]  Office visit - new pt, level 3  (In-House)              R73.03  Prediabetes     R14.0  Abdominal distension (gaseous)     Z13.31  Encounter for screening for depression           Orders:        Depression screen negative  (In-House)              Z12.11  Encounter for screening for malignant neoplasm of colon         ADDENDUMS:      ____________________________________    Addendum: 04/09/2021 08:29 AM - Norma Conti        Remove [id number]    Add 82830    AC

## 2021-05-21 ENCOUNTER — HOSPITAL ENCOUNTER (OUTPATIENT)
Dept: PREADMISSION TESTING | Facility: HOSPITAL | Age: 76
Discharge: HOME | End: 2021-05-21
Attending: SURGERY

## 2021-05-21 LAB
ANION GAP SERPL CALC-SCNC: 9 MMOL/L (ref 8–19)
BUN SERPL-MCNC: 11 MG/DL (ref 5–25)
BUN/CREAT SERPL: 11 {RATIO} (ref 6–20)
CALCIUM SERPL-MCNC: 9 MG/DL (ref 8.7–10.4)
CHLORIDE SERPL-SCNC: 103 MMOL/L (ref 99–111)
CONV CO2: 32 MMOL/L (ref 22–32)
CREAT UR-MCNC: 0.96 MG/DL (ref 0.7–1.2)
GFR SERPLBLD BASED ON 1.73 SQ M-ARVRAT: >60 ML/MIN/{1.73_M2}
GLUCOSE SERPL-MCNC: 88 MG/DL (ref 70–99)
OSMOLALITY SERPL CALC.SUM OF ELEC: 289 MOSM/KG (ref 273–304)
POTASSIUM SERPL-SCNC: 4.4 MMOL/L (ref 3.5–5.3)
SODIUM SERPL-SCNC: 140 MMOL/L (ref 135–147)

## 2021-05-22 LAB — SARS-COV-2 RNA SPEC QL NAA+PROBE: NOT DETECTED

## 2021-05-26 ENCOUNTER — HOSPITAL ENCOUNTER (OUTPATIENT)
Dept: PERIOP | Facility: HOSPITAL | Age: 76
Setting detail: HOSPITAL OUTPATIENT SURGERY
Discharge: HOME | End: 2021-05-26
Attending: SURGERY

## 2021-05-26 LAB
GLUCOSE BLD-MCNC: 119 MG/DL (ref 70–99)
GLUCOSE BLD-MCNC: 153 MG/DL (ref 70–99)

## 2021-06-08 ENCOUNTER — OFFICE VISIT (OUTPATIENT)
Dept: SURGERY | Facility: CLINIC | Age: 76
End: 2021-06-08

## 2021-06-08 DIAGNOSIS — Z09 S/P SPIGELIAN HERNIA REPAIR, FOLLOW-UP EXAM: Primary | ICD-10-CM

## 2021-06-08 PROCEDURE — 99024 POSTOP FOLLOW-UP VISIT: CPT | Performed by: SURGERY

## 2021-06-22 ENCOUNTER — OFFICE VISIT (OUTPATIENT)
Dept: SURGERY | Facility: CLINIC | Age: 76
End: 2021-06-22

## 2021-06-22 VITALS — RESPIRATION RATE: 16 BRPM | HEIGHT: 74 IN | WEIGHT: 283 LBS | BODY MASS INDEX: 36.32 KG/M2

## 2021-06-22 DIAGNOSIS — Z09 S/P SPIGELIAN HERNIA REPAIR, FOLLOW-UP EXAM: Primary | ICD-10-CM

## 2021-06-22 PROCEDURE — 99024 POSTOP FOLLOW-UP VISIT: CPT | Performed by: SURGERY

## 2021-06-22 NOTE — PROGRESS NOTES
Chief Complaint  Post-op Hernia    Subjective          Kennedy Lin presents to [facility] GENERAL SURGERY  History of Present Illness    Kennedy Lin is a 75 y.o. male  who presents today for a postoperative visit.     Patient is here for a follow-up after a robotic repair of a right lower quadrant spigelian hernia.  His pain is significantly improved.  He is a little bit sore but is much better.  He is able to be out and more mobile now.    Past History:  Medical History: has a past medical history of Allergic rhinitis, Asthma, Broken bones, Coccygeal fracture (CMS/HCC), Diabetes mellitus, type 2 (CMS/[facility]), Elevated hemoglobin A1c, Encounter for Medicare annual wellness exam, History of kidney stones, Hyperlipidemia, Hypertension, Lumbar vertebral fracture (CMS/[facility]), Malignant melanoma of skin (CMS/[facility]), Obesity, Class I, BMI 30-34.9, Post-nasal drainage, Rash, Recurrent nephrolithiasis, Right rotator cuff tear, Tobacco chew use, Viral infection, and Vitamin D deficiency.   Surgical History: has a past surgical history that includes Rotator cuff repair; Inguinal hernia repair; and Shoulder surgery.   Family History: family history includes COPD in his mother; Diabetes in his maternal grandmother; Hypertension in his paternal grandfather; Other in his father and paternal grandfather.   Social History: reports that he has quit smoking. His smokeless tobacco use includes chew. He reports that he does not drink alcohol and does not use drugs.  Allergies: Patient has no known allergies.       Current Outpatient Medications:   •  atorvastatin (LIPITOR) 10 MG tablet, Take 1 tablet by mouth Daily., Disp: 90 tablet, Rfl: 1  •  glipizide (GLUCOTROL XL) 10 MG 24 hr tablet, Take 1 tablet by mouth Daily., Disp: 90 tablet, Rfl: 1  •  telmisartan (MICARDIS) 80 MG tablet, Take 1 tablet by mouth Daily., Disp: 90 tablet, Rfl: 1       Physical Exam  His incisions look really good and his abdomen is  "soft  Objective     Vital Signs:   Resp 16   Ht 188 cm (74\")   Wt 128 kg (283 lb)   BMI 36.34 kg/m²              Assessment and Plan    Diagnoses and all orders for this visit:    1. S/P spigelian hernia repair, follow-up exam (Primary)    I will see him back on an as-needed basis.  I have asked him to call me if he were to have any other issues.      "

## 2021-06-28 ENCOUNTER — TELEPHONE (OUTPATIENT)
Dept: SURGERY | Facility: CLINIC | Age: 76
End: 2021-06-28

## 2021-06-28 NOTE — TELEPHONE ENCOUNTER
Patient called the front and said there is a lump where his incision is and wants to make sure this is normal?

## 2021-07-02 VITALS
BODY MASS INDEX: 35.17 KG/M2 | SYSTOLIC BLOOD PRESSURE: 163 MMHG | HEART RATE: 74 BPM | TEMPERATURE: 97 F | DIASTOLIC BLOOD PRESSURE: 85 MMHG | WEIGHT: 281.4 LBS

## 2021-07-13 ENCOUNTER — OFFICE VISIT (OUTPATIENT)
Dept: SURGERY | Facility: CLINIC | Age: 76
End: 2021-07-13

## 2021-07-13 VITALS — RESPIRATION RATE: 16 BRPM | BODY MASS INDEX: 37.34 KG/M2 | HEIGHT: 73 IN | HEART RATE: 72 BPM

## 2021-07-13 DIAGNOSIS — S30.1XXA ABDOMINAL WALL SEROMA, INITIAL ENCOUNTER: Primary | ICD-10-CM

## 2021-07-13 PROCEDURE — 99024 POSTOP FOLLOW-UP VISIT: CPT | Performed by: SURGERY

## 2021-07-13 NOTE — PROGRESS NOTES
Chief Complaint  Post-op    Subjective          Kennedy Lin presents to [facility] GENERAL SURGERY  History of Present Illness    Kennedy Lin is a 75 y.o. male  who presents today for a postoperative visit.     Patient is here for a follow-up after a repair of a right lower quadrant spigelian hernia.  He has a swelling in the right lower abdomen and want me to take a look at that.    Past History:  Medical History: has a past medical history of Allergic rhinitis, Asthma, Broken bones, Coccygeal fracture (CMS/HCC), Diabetes mellitus, type 2 (CMS/HCC), Elevated hemoglobin A1c, Encounter for Medicare annual wellness exam, History of kidney stones, Hyperlipidemia, Hypertension, Lumbar vertebral fracture (CMS/HCC), Malignant melanoma of skin (CMS/[facility]), Obesity, Class I, BMI 30-34.9, Post-nasal drainage, Rash, Recurrent nephrolithiasis, Right rotator cuff tear, Tobacco chew use, Viral infection, and Vitamin D deficiency.   Surgical History: has a past surgical history that includes Rotator cuff repair; Inguinal hernia repair; and Shoulder surgery.   Family History: family history includes COPD in his mother; Diabetes in his maternal grandmother; Hypertension in his paternal grandfather; Other in his father and paternal grandfather.   Social History: reports that he has quit smoking. His smokeless tobacco use includes chew. He reports that he does not drink alcohol and does not use drugs.  Allergies: Patient has no known allergies.       Current Outpatient Medications:   •  atorvastatin (LIPITOR) 10 MG tablet, Take 1 tablet by mouth Daily., Disp: 90 tablet, Rfl: 1  •  glipizide (GLUCOTROL XL) 10 MG 24 hr tablet, Take 1 tablet by mouth Daily., Disp: 90 tablet, Rfl: 1  •  telmisartan (MICARDIS) 80 MG tablet, Take 1 tablet by mouth Daily., Disp: 90 tablet, Rfl: 1       Physical Exam  He appears to have a seroma there in the old hernia sac but I do not feel anything popping in and out there  "consistent with a recurrent hernia.  Objective     Vital Signs:   Pulse 72   Resp 16   Ht 185.4 cm (73\")   BMI 37.34 kg/m²              Assessment and Plan    Diagnoses and all orders for this visit:    1. Abdominal wall seroma, initial encounter (Primary)    I reassured Mr. Lin I think this is just a postoperative seroma.  He indicated that he would call me back towards the end of August if it is not resolved and I have told him that if he wanted we could image it at that time and just make sure that there is no evidence of recurrence.      "

## 2021-07-15 VITALS — WEIGHT: 279.5 LBS | BODY MASS INDEX: 35.87 KG/M2 | HEIGHT: 74 IN | TEMPERATURE: 97.3 F | RESPIRATION RATE: 16 BRPM

## 2021-08-02 ENCOUNTER — TELEPHONE (OUTPATIENT)
Dept: SURGERY | Facility: CLINIC | Age: 76
End: 2021-08-02

## 2021-08-02 NOTE — TELEPHONE ENCOUNTER
Patient still has a knot the size of a golf ball at incision site from surgery a couple months ago.  It has not gotten smaller, or larger.  Not painful. He said Dr. Tubbs told him to call if it did not decrease in size. He had a robotic ventral hernia repair 05/26.

## 2021-08-03 DIAGNOSIS — K43.9 VENTRAL HERNIA WITHOUT OBSTRUCTION OR GANGRENE: Primary | ICD-10-CM

## 2021-08-05 PROBLEM — I10 HIGH BLOOD PRESSURE: Status: ACTIVE | Noted: 2021-08-05

## 2021-08-06 ENCOUNTER — OFFICE VISIT (OUTPATIENT)
Dept: FAMILY MEDICINE CLINIC | Age: 76
End: 2021-08-06

## 2021-08-06 VITALS
HEART RATE: 68 BPM | SYSTOLIC BLOOD PRESSURE: 144 MMHG | DIASTOLIC BLOOD PRESSURE: 69 MMHG | WEIGHT: 278.4 LBS | HEIGHT: 73 IN | BODY MASS INDEX: 36.9 KG/M2

## 2021-08-06 DIAGNOSIS — Z71.85 VACCINE COUNSELING: ICD-10-CM

## 2021-08-06 DIAGNOSIS — I10 BENIGN ESSENTIAL HYPERTENSION: ICD-10-CM

## 2021-08-06 DIAGNOSIS — S30.1XXA ABDOMINAL WALL SEROMA, INITIAL ENCOUNTER: ICD-10-CM

## 2021-08-06 DIAGNOSIS — E11.9 TYPE 2 DIABETES MELLITUS WITHOUT COMPLICATION, WITHOUT LONG-TERM CURRENT USE OF INSULIN (HCC): Primary | ICD-10-CM

## 2021-08-06 PROBLEM — E66.01 CLASS 2 SEVERE OBESITY DUE TO EXCESS CALORIES WITH SERIOUS COMORBIDITY AND BODY MASS INDEX (BMI) OF 35.0 TO 35.9 IN ADULT: Status: RESOLVED | Noted: 2020-10-09 | Resolved: 2021-08-06

## 2021-08-06 PROBLEM — S32.2XXK: Status: RESOLVED | Noted: 2017-08-17 | Resolved: 2021-08-06

## 2021-08-06 PROBLEM — Z85.820 HISTORY OF MELANOMA: Status: RESOLVED | Noted: 2020-10-09 | Resolved: 2021-08-06

## 2021-08-06 PROBLEM — Z72.0 CHEWS TOBACCO: Status: RESOLVED | Noted: 2020-10-09 | Resolved: 2021-08-06

## 2021-08-06 PROBLEM — S32.000A CLOSED COMPRESSION FRACTURE OF LUMBAR VERTEBRA: Status: RESOLVED | Noted: 2017-08-17 | Resolved: 2021-08-06

## 2021-08-06 PROBLEM — Z09 S/P SPIGELIAN HERNIA REPAIR, FOLLOW-UP EXAM: Status: RESOLVED | Noted: 2021-06-08 | Resolved: 2021-08-06

## 2021-08-06 PROBLEM — E55.9 VITAMIN D DEFICIENCY: Status: RESOLVED | Noted: 2020-10-09 | Resolved: 2021-08-06

## 2021-08-06 PROBLEM — Z87.81 HISTORY OF COMPRESSION FRACTURE OF SPINE: Status: RESOLVED | Noted: 2020-10-09 | Resolved: 2021-08-06

## 2021-08-06 PROBLEM — E78.2 MIXED HYPERLIPIDEMIA: Status: RESOLVED | Noted: 2020-10-20 | Resolved: 2021-08-06

## 2021-08-06 PROBLEM — E66.812 CLASS 2 SEVERE OBESITY DUE TO EXCESS CALORIES WITH SERIOUS COMORBIDITY AND BODY MASS INDEX (BMI) OF 35.0 TO 35.9 IN ADULT: Status: RESOLVED | Noted: 2020-10-09 | Resolved: 2021-08-06

## 2021-08-06 PROBLEM — J30.1 SEASONAL ALLERGIC RHINITIS DUE TO POLLEN: Status: RESOLVED | Noted: 2020-10-09 | Resolved: 2021-08-06

## 2021-08-06 PROCEDURE — 99214 OFFICE O/P EST MOD 30 MIN: CPT | Performed by: FAMILY MEDICINE

## 2021-08-06 RX ORDER — GLIPIZIDE 10 MG/1
10 TABLET, FILM COATED, EXTENDED RELEASE ORAL DAILY
Qty: 90 TABLET | Refills: 3 | Status: SHIPPED | OUTPATIENT
Start: 2021-08-06 | End: 2021-11-11 | Stop reason: SDUPTHER

## 2021-08-06 RX ORDER — TELMISARTAN 80 MG/1
80 TABLET ORAL DAILY
Qty: 90 TABLET | Refills: 3 | Status: SHIPPED | OUTPATIENT
Start: 2021-08-06 | End: 2021-11-11 | Stop reason: SDUPTHER

## 2021-08-06 NOTE — PROGRESS NOTES
Chief Complaint  Establish Care (New patient to Dr. Doss, last PCP [place], Diane)  --HISTORY OF DIABETES AND HIGH BLOOD PRESSURE.  ALSO HAS SOME RUQ AWELLING  Subjective          Kennedy Lin presents to [facility] FAMILY MEDICINE  --LAST HGA1C WAS 7.2%, DOING WELL ON CURRENT MEDS.  DECLINES TAKING A STATIN  --TOLERATING BP MEDS WITHOUT APPARENT SIDE EFFECTS  --HAS A RLQ SWELLING IN THE AREA OF A PRIOR HERNIA REPAIR.  GEN SURGERY WILL BE OBTAINING IMAGING IN THE NEXT FEW DAYS ALTHOUGH THE WORKING DIAGNOSIS  IS A SEROMA  --HE HAS QUESTIONS ABOUT THE COVID VACCINE           No Known Allergies     Health Maintenance Due   Topic Date Due   • URINE MICROALBUMIN  Never done   • COLORECTAL CANCER SCREENING  Never done   • Pneumococcal Vaccine 65+ (1 of 2 - PPSV23) Never done   • COVID-19 Vaccine (1) Never done   • TDAP/TD VACCINES (1 - Tdap) Never done   • ZOSTER VACCINE (1 of 2) Never done   • DIABETIC FOOT EXAM  Never done   • DIABETIC EYE EXAM  Never done        Current Outpatient Medications on File Prior to Visit   Medication Sig   • [DISCONTINUED] glipizide (GLUCOTROL XL) 10 MG 24 hr tablet Take 1 tablet by mouth Daily.   • [DISCONTINUED] telmisartan (MICARDIS) 80 MG tablet Take 1 tablet by mouth Daily.   • [DISCONTINUED] atorvastatin (LIPITOR) 10 MG tablet Take 1 tablet by mouth Daily.     No current facility-administered medications on file prior to visit.       Immunization History   Administered Date(s) Administered   • Fluad Quad 65+ 10/29/2020   • Fluzone High Dose =>65 Years ([employer] ONLY) 10/03/2018, 09/03/2019   • Hepatitis A 12/03/2018       Review of Systems   Constitutional: Negative for activity change, appetite change, chills, fatigue and fever.   HENT: Negative for ear pain, rhinorrhea and sore throat.    Respiratory: Negative for cough and shortness of breath.    Cardiovascular: Negative for chest pain, palpitations and leg swelling.   Gastrointestinal: Negative for abdominal  "pain, nausea and vomiting.   Musculoskeletal: Negative for arthralgias and myalgias.   Neurological: Negative for headache.        Objective     /69 (BP Location: Left arm, Patient Position: Sitting, Cuff Size: Large Adult)   Pulse 68   Ht 185.4 cm (73\")   Wt 126 kg (278 lb 6.4 oz)   BMI 36.73 kg/m²       Physical Exam  Vitals and nursing note reviewed.   Constitutional:       General: He is not in acute distress.     Appearance: Normal appearance.   Cardiovascular:      Rate and Rhythm: Normal rate and regular rhythm.      Heart sounds: No murmur heard.     Pulmonary:      Effort: Pulmonary effort is normal.      Breath sounds: Normal breath sounds.   Abdominal:      Palpations: Abdomen is soft. There is mass.      Tenderness: There is no abdominal tenderness.      Comments: 5 X 5 CM SOFT MASS IN THE RLQ.  MILDLY TENDER   Musculoskeletal:         General: No swelling.      Cervical back: Neck supple.   Lymphadenopathy:      Cervical: No cervical adenopathy.   Neurological:      General: No focal deficit present.      Mental Status: He is alert.      Cranial Nerves: No cranial nerve deficit.      Coordination: Coordination normal.      Gait: Gait normal.   Psychiatric:         Mood and Affect: Mood normal.         Behavior: Behavior normal.         Result Review :                             Assessment and Plan      Diagnoses and all orders for this visit:    1. Type 2 diabetes mellitus without complication, without long-term current use of insulin (CMS/[facility]) (Primary)  Assessment & Plan:  Diabetes is improving with treatment.   Continue current treatment regimen.  Regular aerobic exercise.  Diabetes will be reassessed in 3 months.      2. Benign essential hypertension  Assessment & Plan:  Hypertension is improving with treatment.  Continue current treatment regimen.  Dietary sodium restriction.  Weight loss.  Regular aerobic exercise.  Blood pressure will be reassessed in 3 months.      3. Abdominal wall " seroma, initial encounter  Assessment & Plan:  IMAGING AND PLANS PER SURGERY       4. Vaccine counseling  Assessment & Plan:  ANSWERED HIS QUESTIONS ABOUT THE COVID VACCINE AND ADVISED THAT HE SHOULD RECEIVE IT        Other orders  -     telmisartan (MICARDIS) 80 MG tablet; Take 1 tablet by mouth Daily.  Dispense: 90 tablet; Refill: 3  -     glipizide (GLUCOTROL XL) 10 MG 24 hr tablet; Take 1 tablet by mouth Daily.  Dispense: 90 tablet; Refill: 3          Follow Up     Return in about 3 months (around 11/6/2021) for Recheck.    Patient was given instructions and counseling regarding his condition or for health maintenance advice. Please see specific information pulled into the AVS if appropriate.

## 2021-08-09 ENCOUNTER — HOSPITAL ENCOUNTER (OUTPATIENT)
Dept: CT IMAGING | Facility: HOSPITAL | Age: 76
Discharge: HOME | End: 2021-08-09
Admitting: SURGERY

## 2021-08-09 DIAGNOSIS — K43.9 VENTRAL HERNIA WITHOUT OBSTRUCTION OR GANGRENE: ICD-10-CM

## 2021-08-09 PROCEDURE — 74176 CT ABD & PELVIS W/O CONTRAST: CPT | Performed by: RADIOLOGY

## 2021-08-09 PROCEDURE — 74176 CT ABD & PELVIS W/O CONTRAST: CPT

## 2021-11-11 ENCOUNTER — LAB (OUTPATIENT)
Dept: LAB | Facility: HOSPITAL | Age: 76
End: 2021-11-11

## 2021-11-11 ENCOUNTER — OFFICE VISIT (OUTPATIENT)
Dept: FAMILY MEDICINE CLINIC | Age: 76
End: 2021-11-11

## 2021-11-11 VITALS
TEMPERATURE: 97.9 F | WEIGHT: 272.2 LBS | HEIGHT: 73 IN | HEART RATE: 68 BPM | DIASTOLIC BLOOD PRESSURE: 78 MMHG | BODY MASS INDEX: 36.08 KG/M2 | SYSTOLIC BLOOD PRESSURE: 136 MMHG

## 2021-11-11 DIAGNOSIS — E11.9 TYPE 2 DIABETES MELLITUS WITHOUT COMPLICATION, WITHOUT LONG-TERM CURRENT USE OF INSULIN (HCC): Primary | ICD-10-CM

## 2021-11-11 DIAGNOSIS — Z12.5 SCREENING FOR PROSTATE CANCER: ICD-10-CM

## 2021-11-11 DIAGNOSIS — I10 BENIGN ESSENTIAL HYPERTENSION: ICD-10-CM

## 2021-11-11 DIAGNOSIS — Z71.85 VACCINE COUNSELING: ICD-10-CM

## 2021-11-11 DIAGNOSIS — S30.1XXA ABDOMINAL WALL SEROMA, INITIAL ENCOUNTER: ICD-10-CM

## 2021-11-11 LAB
ALBUMIN SERPL-MCNC: 4.3 G/DL (ref 3.5–5.2)
ALBUMIN UR-MCNC: <1.2 MG/DL
ALBUMIN/GLOB SERPL: 1.4 G/DL
ALP SERPL-CCNC: 71 U/L (ref 39–117)
ALT SERPL W P-5'-P-CCNC: 21 U/L (ref 1–41)
ANION GAP SERPL CALCULATED.3IONS-SCNC: 4.9 MMOL/L (ref 5–15)
AST SERPL-CCNC: 20 U/L (ref 1–40)
BILIRUB SERPL-MCNC: 0.6 MG/DL (ref 0–1.2)
BUN SERPL-MCNC: 13 MG/DL (ref 8–23)
BUN/CREAT SERPL: 13.3 (ref 7–25)
CALCIUM SPEC-SCNC: 9.1 MG/DL (ref 8.6–10.5)
CHLORIDE SERPL-SCNC: 102 MMOL/L (ref 98–107)
CHOLEST SERPL-MCNC: 155 MG/DL (ref 0–200)
CO2 SERPL-SCNC: 31.1 MMOL/L (ref 22–29)
CREAT SERPL-MCNC: 0.98 MG/DL (ref 0.76–1.27)
CREAT UR-MCNC: 171.6 MG/DL
DEPRECATED RDW RBC AUTO: 45.5 FL (ref 37–54)
ERYTHROCYTE [DISTWIDTH] IN BLOOD BY AUTOMATED COUNT: 13.5 % (ref 12.3–15.4)
GFR SERPL CREATININE-BSD FRML MDRD: 75 ML/MIN/1.73
GLOBULIN UR ELPH-MCNC: 3 GM/DL
GLUCOSE SERPL-MCNC: 127 MG/DL (ref 65–99)
HBA1C MFR BLD: 6.89 % (ref 4.8–5.6)
HCT VFR BLD AUTO: 45.3 % (ref 37.5–51)
HDLC SERPL-MCNC: 29 MG/DL (ref 40–60)
HGB BLD-MCNC: 14.5 G/DL (ref 13–17.7)
LDLC SERPL CALC-MCNC: 97 MG/DL (ref 0–100)
LDLC/HDLC SERPL: 3.19 {RATIO}
MCH RBC QN AUTO: 29.1 PG (ref 26.6–33)
MCHC RBC AUTO-ENTMCNC: 32 G/DL (ref 31.5–35.7)
MCV RBC AUTO: 91 FL (ref 79–97)
MICROALBUMIN/CREAT UR: NORMAL MG/G{CREAT}
PLATELET # BLD AUTO: 204 10*3/MM3 (ref 140–450)
PMV BLD AUTO: 10.1 FL (ref 6–12)
POTASSIUM SERPL-SCNC: 4 MMOL/L (ref 3.5–5.2)
PROT SERPL-MCNC: 7.3 G/DL (ref 6–8.5)
PSA SERPL-MCNC: 1.69 NG/ML (ref 0–4)
RBC # BLD AUTO: 4.98 10*6/MM3 (ref 4.14–5.8)
SODIUM SERPL-SCNC: 138 MMOL/L (ref 136–145)
TRIGL SERPL-MCNC: 167 MG/DL (ref 0–150)
TSH SERPL DL<=0.05 MIU/L-ACNC: 3.69 UIU/ML (ref 0.27–4.2)
VLDLC SERPL-MCNC: 29 MG/DL (ref 5–40)
WBC # BLD AUTO: 7.9 10*3/MM3 (ref 3.4–10.8)

## 2021-11-11 PROCEDURE — 99214 OFFICE O/P EST MOD 30 MIN: CPT | Performed by: FAMILY MEDICINE

## 2021-11-11 PROCEDURE — 82570 ASSAY OF URINE CREATININE: CPT | Performed by: FAMILY MEDICINE

## 2021-11-11 PROCEDURE — 82043 UR ALBUMIN QUANTITATIVE: CPT | Performed by: FAMILY MEDICINE

## 2021-11-11 PROCEDURE — 85027 COMPLETE CBC AUTOMATED: CPT | Performed by: FAMILY MEDICINE

## 2021-11-11 PROCEDURE — 80053 COMPREHEN METABOLIC PANEL: CPT | Performed by: FAMILY MEDICINE

## 2021-11-11 PROCEDURE — 84443 ASSAY THYROID STIM HORMONE: CPT | Performed by: FAMILY MEDICINE

## 2021-11-11 PROCEDURE — 80061 LIPID PANEL: CPT | Performed by: FAMILY MEDICINE

## 2021-11-11 PROCEDURE — G0103 PSA SCREENING: HCPCS | Performed by: FAMILY MEDICINE

## 2021-11-11 PROCEDURE — 36415 COLL VENOUS BLD VENIPUNCTURE: CPT | Performed by: FAMILY MEDICINE

## 2021-11-11 PROCEDURE — 83036 HEMOGLOBIN GLYCOSYLATED A1C: CPT | Performed by: FAMILY MEDICINE

## 2021-11-11 RX ORDER — TELMISARTAN 80 MG/1
80 TABLET ORAL DAILY
Qty: 90 TABLET | Refills: 3 | Status: SHIPPED | OUTPATIENT
Start: 2021-11-11 | End: 2022-11-10

## 2021-11-11 RX ORDER — GLIPIZIDE 10 MG/1
10 TABLET, FILM COATED, EXTENDED RELEASE ORAL DAILY
Qty: 90 TABLET | Refills: 3 | Status: SHIPPED | OUTPATIENT
Start: 2021-11-11 | End: 2023-02-13

## 2022-05-03 NOTE — PROCEDURES
Killian Singletary am scribing for and in the presence of Carolina Henderson. Timothy CLARKE, MS, F.A.C.C. Patient: Rukhsana Contreras  : 1949  Date of Visit: May 3, 2022    REASON FOR VISIT / CONSULTATION: Follow-up (HX: PAF, CHF, Syncope, Pacer, SSS, HTN. Pt states he is doing well. Denies: CP, Palpitaiotns, Lighetaded/dizzienss, SOB. )      History of Present Illness:        Dear Dr Juan Adler,      I had the pleasure of seeing Rukhsana Contreras in my office today. Mr. Erika Pelletier is a 71 y.o. male  with a history of a dual chamber Biotronik pacemaker for sick sinus syndrome. He also was a smoker many years ago. His echocardiogram on 2017 showed an ejection fraction of 60%. He also has a history of paroxysmal atrial fibrillation which has been fairly rare in the past and has been treated with Eliquis. Normal stress and echo done in 2021 while in [place] He did have syncope in February while in [place] which occurred suddenly after getting up to use the bathroom. He had some pretty severe episodes of lightheadedness and dizziness with recurrent short episodes of loss of consciousness. He was admitted and he had stress test and echo done then and was told both where normal.     Mr. Erika Pelletier is here for follow up today. He reports doing fairly well since his last visit. He is doing well in [place]. He does exercise every day. He uses a treadmill and an exercise bike at this time that he uses. He does keep track of his burned calories via his phone on an kb. He denied any heart palpitations or shortness of breath. He has an occasional lightheadedness after working out some times if he bends over to do something. No falls or near falls. He does watch business news at times and the stock market channel and at times he can feel guilty doing this. He also has grandchildren that he stays busy with.  He denied any current or recent chest pain, abdominal pain, bleeding problems, problems with his medications or any Patient: DONNA GILLIAM     Acct: [id number]     Report: #[id number]  MR #:  [id number]     DOS: 2021 0859     : 1945  DICTATING: Brando Tubbs  ***Signed***  --------------------------------------------------------------------------------------------------------------------  Post Procedure/Operative Note      Date       21            Pre-Operative Diagnosis:      Right lower quadrant spigelian hernia            Post-Operative Diagnosis:      Same as pre-op diagnosis            Surgeon/Assistants      Surgeon      Arley Castellon            Anesthesia      General            Procedure Performed/Technique      Robotic ventral hernia repair            Specimen/Tissue Removed:      None            Findings:      None            Complications:      No            Estimated Blood Loss:      20 mL            Procedure:      The patient was taken to the operating room and placed on the table in supine     position. After induction of general endotracheal anesthesia, his abdomen was     prepped and draped sterilely. We insufflated the abdomen with a Veress needle     placed in the left upper quadrant and a 12 mm left upper quadrant port was     placed into the peritoneal cavity using a Visiport. Three 8 mm da Suzan robotic     trochars were then placed along the left side of the abdomen under direct     vision. The da Suzan robot was then brought to the table and the robotic arms     were docked to the trochars. The camera and instruments were placed into the     peritoneal cavity under direct vision. There was a right lower quadrant ventral     hernia defect with a fair amount of small bowel out in the hernia sac. We were     able to reduce all of the small bowel out of the hernia. We identified the appen    kolby going up into the hernia defect and it was adherent to the internal surface     of the hernia sac. The appendix was carefully dissected away and then reduced     back  into the peritoneum. We were then able to get into a preperitoneal plane     and actually dissected the entire hernia sac free and reduced it back into the     peritoneum. He had a hernia defect that was perhaps 3 x 3 cm. We went ahead and     closed that with a running 0 permanent V-Loc suture. We then placed a 10 x 15 cm    piece of ventral light echo mesh into the peritoneal cavity and pulled it up to     the abdominal wall below her hernia defect with a suture passer. The mesh was     then sewn in place with 2 running 2-0 absorbable V-Loc sutures. We appeared to     have good coverage from the margins of the hernia defect and also appeared to     have good hemostasis. At this point the instruments were removed from the     abdomen and the robotic arms were undocked from the trochars. The left upper     quadrant 12 mm port site was closed with a [place] closure device and a     0 Mersilene tie. All skin incisions were closed with 4-0 Vicryl subcuticular     sutures. Sterile dressings were applied and he was taken to the post anesthesia     recovery room in stable condition.            Brando Tubbs                  May 26, 2021 08:59      Electronically signed by Brando Tubbs  05/26/2021 08:59     Disclaimer: Converted hospital document may not contain table formatting or lab diagrams. Please see [facility] for authenticated document.   other concerns at this time. Exercise Tolerance: Mr. Dmitry Angeles reports that he has an excellent exercise tolerance. His says that he could walk 1 mile without developing chest discomfort or significant shortness of breath. As above. He usually walks a mile then takes a break then walks another mile. Bleeding Risks: Mr. Dmitry Angeles denies any current or recent bleeding problems including a history of a GI bleed, ulcers, recent or upcoming surgeries, blood in his stool or black tarry stools or blood in his urine. PAST MEDICAL HISTORY:        Past Medical History:   Diagnosis Date    Asthma     BPH (benign prostatic hyperplasia)     Fracture of L1 vertebra ([address] 10/2010    GERD (gastroesophageal reflux disease)     Gout     High altitude pulmonary edema 2010    Hyperlipidemia     Hypertension     Radiculopathy of leg     left leg    Sleep apnea     Treated C-PAP 10 years    Thyroid disease     Wrist fracture, left 10/2010           CURRENT ALLERGIES: Amoxicillin-pot clavulanate, Ciprofloxacin, and Amoxicillin REVIEW OF SYSTEMS: 14 systems were reviewed. Pertinent positives and negatives as above, all else negative. Past Surgical History:   Procedure Laterality Date    COLON SURGERY      perirectal abcess    PACEMAKER INSERTION      Social History:  Social History     Tobacco Use    Smoking status: Former Smoker     Packs/day: 0.50     Years: 8.00     Pack years: 4.00     Quit date: 1981     Years since quittin.4    Smokeless tobacco: Former User   Vaping Use    Vaping Use: Never used   Substance Use Topics    Alcohol use:  Yes     Alcohol/week: 1.0 standard drink     Types: 1 Glasses of wine per week     Comment: occ    Drug use: Never        CURRENT MEDICATIONS:        Outpatient Medications Marked as Taking for the 5/3/22 encounter (Office Visit) with Gabriel Davenport MD   Medication Sig Dispense Refill    finasteride (PROSCAR) 5 MG tablet Take 1 tablet by mouth daily 90 tablet 3    levothyroxine (SYNTHROID) 125 MCG tablet Take 1 tablet by mouth Daily 90 tablet 3    tamsulosin (FLOMAX) 0.4 MG capsule Take 1 capsule by mouth daily 90 capsule 3    allopurinol (ZYLOPRIM) 300 MG tablet Take 1 tablet by mouth daily 90 tablet 2    valsartan (DIOVAN) 80 MG tablet Take 0.5 tablets by mouth daily Patient taking half tablet daily. 90 tablet 1    apixaban (ELIQUIS) 5 MG TABS tablet Take 1 tablet by mouth 2 times daily 180 tablet 3    albuterol sulfate  (90 BASE) MCG/ACT inhaler Inhale 2 puffs into the lungs every 6 hours as needed for Wheezing         FAMILY HISTORY: family history includes COPD in his father; Hypertension in his sister; Pacemaker in his mother; Seizures in his mother. Physical Examination:     BP (!) 159/100 (Site: Right Upper Arm, Position: Sitting, Cuff Size: Medium Adult)   Pulse 86   Resp 18   Ht 5' 4\" (1.626 m)   Wt 176 lb (79.8 kg)   SpO2 98%   BMI 30.21 kg/m²  Body mass index is 30.21 kg/m². Constitutional: He is oriented to person, place, and time. He appears well-developed and well-nourished. In no acute distress. HEENT: Normocephalic and atraumatic. No JVD present. Carotid bruit is not present. No mass and no thyromegaly present. No lymphadenopathy present. Cardiovascular: Normal rate, regular rhythm, normal heart sounds. Exam reveals no gallop and no friction rubs. A I/VI systolic murmur was heard maximally at the second right apex. Pulmonary/Chest: Effort normal and breath sounds normal. No respiratory distress. He has no wheezes, rhonchi or rales. Abdominal: Soft, non-tender. Bowel sounds and aorta are normal. He exhibits no organomegaly, mass or bruit. Extremities: Trace lower extremity edema. No cyanosis and no clubbing. Pulses are 2+ radial and carotid pulses. 2+ dorsalis pedis and posterior tibial pulses bilaterally. Neurological: He is alert and oriented to person, place, and time.  No evidence of gross cranial nerve deficit. Coordination appeared normal.   Skin: Skin is warm and dry. There is no rash or diaphoresis. Psychiatric: He has a normal mood and affect. His speech is normal and behavior is normal.         MOST RECENT LABS ON RECORD:   Lab Results   Component Value Date    WBC 4.4 05/03/2022    HGB 14.7 05/03/2022    HCT 44.1 05/03/2022     05/03/2022    CHOL 100 11/21/2018    TRIG 100 11/21/2018    HDL 44 11/21/2018    ALT 24 05/03/2022    AST 20 05/03/2022     05/03/2022    K 4.6 05/03/2022     05/03/2022    CREATININE 0.91 05/03/2022    BUN 21 05/03/2022    CO2 27 05/03/2022    TSH 1.56 05/03/2022    PSA 2.30 12/22/2015    LABA1C 5.1 01/21/2012       ASSESSMENT:     1. PAF (paroxysmal atrial fibrillation) ([facility] [address]    2. Chronic diastolic CHF (congestive heart failure), NYHA class 2 ([address]    3. Essential hypertension    4. Pacemaker    5. SSS (sick sinus syndrome) (HCC)    6. Lightheaded    7. Encounter for current long-term use of anticoagulants       PLAN:          Paroxysmal Atrial Fibrillation: Rate Control, no recurrent episodes of atrial fibrillation   Beta Blocker: Not able to take due to a history of fatigue as well as orthostatic hypotension. [id number] Score for Atrial Fibrillation Stroke Risk    Risk   Factors  Component Value   C CHF Yes 1   H HTN Yes 1   A2 Age >= 76 No,  (76 y.o.) 0   D DM No 0   S2 Prior Stroke/TIA No 0   V Vascular Disease No 0   A Age 74-69 Yes,  (76 y.o.) 1   Sc Sex male 0    [id number]  Score  3   Score last updated 8/02/46 50:30 AM EDT  Click here for a link to the UpToDate guideline \"Atrial Fibrillation: Anticoagulation therapy to prevent embolization  Disclaimer: Risk Score calculation is dependent on accuracy of patient problem list and past encounter diagnosis. Stroke Risk: His CHADS2-VASc score is 3/9 (3.2% stroke risk)  Anticoagulation: Continue Apixaban (Eliquis) 5 mg every 12 hours.         Chronic diastolic heart failure: New York Heart Association Class: IIa  · Beta Blocker: Contraindicated due to history of symptomatic bradycardia, intolerance and/or allergy.  ACE Inibitor/ARB: Continue valsartan (Diovan) 40 mg daily.  Heart failure counseling: I advised them to try and keep their legs up whenever possible and to limit salt in their diet. Essential Hypertension: Borderline Controlled however he is very sensitive to blood pressure changes. He keeps very good track of this and his symptoms via an kb as well on his phone. I advised him to keep an eye on this and to call me if his blood pressure stays elevated or if he develops any worsening symptoms to call us and we can always make changes over the phone. Last Echo from 2017 showed no LVH at this time. · ACE Inibitor/ARB: Continue valsartan (Diovan) 40 mg daily. Dual Chamber Pacemaker:  Indication for Device Placement: Sick Sinus Syndrome  Interrogation Findings: I reviewed the results of their most recent home interrogation from 11/2021. · Hyperlipidemia: Restart atorvastatin 10 mg daily. Pending Lipid Panel that he had done today. He has been off of it since October 2021. · Lightheadedness/dizziness: Mild at this time. · Nonpharmacologic counseling: Because of his condition, I reminded him to try and keep himself well-hydrated and to take extra time when moving from laying to sitting, sitting to standing and standing to walking. I also explained to him to help improve his symptoms he should include 3 g sodium diet, 1 or 2 L of sports drinks daily, knee-high compressions stockings. In the meantime, I encouraged Mr. Jaz Serrato to continue to take his other medications. FOLLOW UP:   I told Mr. Jaz Serrato to call my office if he had any problems, but otherwise I asked him to Return in about 1 year (around 5/3/2023). However, I would be happy to see him sooner should the need arise. Sincerely,  Beckie Singh. Timothy CLARKE, MS, F.A.C.C.   [address] [place] [address] [facility]    [address] [place] [address] Claudio De Paume, [place], [address] [place] [address]  Phone: [phone], Fax: [phone]     I believe that the risk of significant morbidity and mortality related to the patient's current medical conditions are: Intermediate. Approximately 35 minutes were spent during prep work, discussion and exam of the patient, and follow up documentation and all of their questions were answered. The documentation recorded by the scribe, accurately and completely reflects the services I personally performed and the decisions made by me. Cynthia Carson MD, MS, F.A.C.C.  May 3, 2022

## 2022-05-12 ENCOUNTER — HOSPITAL ENCOUNTER (OUTPATIENT)
Dept: GENERAL RADIOLOGY | Facility: HOSPITAL | Age: 77
Discharge: HOME | End: 2022-05-12

## 2022-05-12 ENCOUNTER — OFFICE VISIT (OUTPATIENT)
Dept: FAMILY MEDICINE CLINIC | Age: 77
End: 2022-05-12

## 2022-05-12 ENCOUNTER — LAB (OUTPATIENT)
Dept: LAB | Facility: HOSPITAL | Age: 77
End: 2022-05-12

## 2022-05-12 VITALS
HEIGHT: 73 IN | DIASTOLIC BLOOD PRESSURE: 73 MMHG | BODY MASS INDEX: 36.05 KG/M2 | SYSTOLIC BLOOD PRESSURE: 110 MMHG | TEMPERATURE: 98.1 F | WEIGHT: 272 LBS | HEART RATE: 86 BPM

## 2022-05-12 DIAGNOSIS — E11.9 TYPE 2 DIABETES MELLITUS TREATED WITHOUT INSULIN: ICD-10-CM

## 2022-05-12 DIAGNOSIS — S30.1XXA ABDOMINAL WALL SEROMA, INITIAL ENCOUNTER: ICD-10-CM

## 2022-05-12 DIAGNOSIS — R35.1 NOCTURIA: ICD-10-CM

## 2022-05-12 DIAGNOSIS — I10 ESSENTIAL HYPERTENSION: ICD-10-CM

## 2022-05-12 DIAGNOSIS — R06.09 DYSPNEA ON EXERTION: ICD-10-CM

## 2022-05-12 DIAGNOSIS — E11.9 TYPE 2 DIABETES MELLITUS WITHOUT COMPLICATION, WITHOUT LONG-TERM CURRENT USE OF INSULIN: Primary | ICD-10-CM

## 2022-05-12 PROBLEM — Z71.85 VACCINE COUNSELING: Status: RESOLVED | Noted: 2021-08-06 | Resolved: 2022-05-12

## 2022-05-12 PROBLEM — Z12.5 SCREENING FOR PROSTATE CANCER: Status: RESOLVED | Noted: 2021-11-11 | Resolved: 2022-05-12

## 2022-05-12 LAB
ALBUMIN SERPL-MCNC: 4 G/DL (ref 3.5–5.2)
ALBUMIN/GLOB SERPL: 1.3 G/DL
ALP SERPL-CCNC: 68 U/L (ref 39–117)
ALT SERPL W P-5'-P-CCNC: 24 U/L (ref 1–41)
ANION GAP SERPL CALCULATED.3IONS-SCNC: 11.6 MMOL/L (ref 5–15)
AST SERPL-CCNC: 26 U/L (ref 1–40)
BACTERIA UR QL AUTO: ABNORMAL /HPF
BASOPHILS # BLD AUTO: 0.04 10*3/MM3 (ref 0–0.2)
BASOPHILS NFR BLD AUTO: 0.5 % (ref 0–1.5)
BILIRUB SERPL-MCNC: 0.4 MG/DL (ref 0–1.2)
BILIRUB UR QL STRIP: ABNORMAL
BUN SERPL-MCNC: 16 MG/DL (ref 8–23)
BUN/CREAT SERPL: 12.8 (ref 7–25)
CALCIUM SPEC-SCNC: 9.5 MG/DL (ref 8.6–10.5)
CHLORIDE SERPL-SCNC: 104 MMOL/L (ref 98–107)
CHOLEST SERPL-MCNC: 146 MG/DL (ref 0–200)
CLARITY UR: ABNORMAL
CO2 SERPL-SCNC: 25.4 MMOL/L (ref 22–29)
COLOR UR: ABNORMAL
CREAT SERPL-MCNC: 1.25 MG/DL (ref 0.76–1.27)
DEPRECATED RDW RBC AUTO: 45.8 FL (ref 37–54)
EGFRCR SERPLBLD CKD-EPI 2021: 60 ML/MIN/1.73
EOSINOPHIL # BLD AUTO: 0.25 10*3/MM3 (ref 0–0.4)
EOSINOPHIL NFR BLD AUTO: 2.8 % (ref 0.3–6.2)
ERYTHROCYTE [DISTWIDTH] IN BLOOD BY AUTOMATED COUNT: 13.4 % (ref 12.3–15.4)
GLOBULIN UR ELPH-MCNC: 3 GM/DL
GLUCOSE SERPL-MCNC: 133 MG/DL (ref 65–99)
GLUCOSE UR STRIP-MCNC: NEGATIVE MG/DL
HBA1C MFR BLD: 6.5 % (ref 4.8–5.6)
HCT VFR BLD AUTO: 47.8 % (ref 37.5–51)
HDLC SERPL-MCNC: 27 MG/DL (ref 40–60)
HGB BLD-MCNC: 14.9 G/DL (ref 13–17.7)
HGB UR QL STRIP.AUTO: ABNORMAL
HYALINE CASTS UR QL AUTO: ABNORMAL /LPF
IMM GRANULOCYTES # BLD AUTO: 0.03 10*3/MM3 (ref 0–0.05)
IMM GRANULOCYTES NFR BLD AUTO: 0.3 % (ref 0–0.5)
KETONES UR QL STRIP: NEGATIVE
LDLC SERPL CALC-MCNC: 86 MG/DL (ref 0–100)
LDLC/HDLC SERPL: 2.96 {RATIO}
LEUKOCYTE ESTERASE UR QL STRIP.AUTO: ABNORMAL
LYMPHOCYTES # BLD AUTO: 2.1 10*3/MM3 (ref 0.7–3.1)
LYMPHOCYTES NFR BLD AUTO: 23.7 % (ref 19.6–45.3)
MCH RBC QN AUTO: 28.8 PG (ref 26.6–33)
MCHC RBC AUTO-ENTMCNC: 31.2 G/DL (ref 31.5–35.7)
MCV RBC AUTO: 92.5 FL (ref 79–97)
MONOCYTES # BLD AUTO: 0.91 10*3/MM3 (ref 0.1–0.9)
MONOCYTES NFR BLD AUTO: 10.3 % (ref 5–12)
MUCOUS THREADS URNS QL MICRO: ABNORMAL /HPF
NEUTROPHILS NFR BLD AUTO: 5.54 10*3/MM3 (ref 1.7–7)
NEUTROPHILS NFR BLD AUTO: 62.4 % (ref 42.7–76)
NITRITE UR QL STRIP: POSITIVE
PH UR STRIP.AUTO: 5.5 [PH] (ref 5–8)
PLATELET # BLD AUTO: 261 10*3/MM3 (ref 140–450)
PMV BLD AUTO: 10.7 FL (ref 6–12)
POTASSIUM SERPL-SCNC: 4.4 MMOL/L (ref 3.5–5.2)
PROT SERPL-MCNC: 7 G/DL (ref 6–8.5)
PROT UR QL STRIP: ABNORMAL
RBC # BLD AUTO: 5.17 10*6/MM3 (ref 4.14–5.8)
RBC # UR STRIP: ABNORMAL /HPF
REF LAB TEST METHOD: ABNORMAL
SODIUM SERPL-SCNC: 141 MMOL/L (ref 136–145)
SP GR UR STRIP: >=1.03 (ref 1–1.03)
SQUAMOUS #/AREA URNS HPF: ABNORMAL /HPF
TRIGL SERPL-MCNC: 195 MG/DL (ref 0–150)
UROBILINOGEN UR QL STRIP: ABNORMAL
VLDLC SERPL-MCNC: 33 MG/DL (ref 5–40)
WBC # UR STRIP: ABNORMAL /HPF
WBC CLUMPS # UR AUTO: ABNORMAL /HPF
WBC NRBC COR # BLD: 8.87 10*3/MM3 (ref 3.4–10.8)

## 2022-05-12 PROCEDURE — 71046 X-RAY EXAM CHEST 2 VIEWS: CPT

## 2022-05-12 PROCEDURE — 93000 ELECTROCARDIOGRAM COMPLETE: CPT | Performed by: FAMILY MEDICINE

## 2022-05-12 PROCEDURE — 99214 OFFICE O/P EST MOD 30 MIN: CPT | Performed by: FAMILY MEDICINE

## 2022-05-12 PROCEDURE — 87077 CULTURE AEROBIC IDENTIFY: CPT | Performed by: FAMILY MEDICINE

## 2022-05-12 PROCEDURE — 87086 URINE CULTURE/COLONY COUNT: CPT | Performed by: FAMILY MEDICINE

## 2022-05-12 PROCEDURE — 80061 LIPID PANEL: CPT | Performed by: FAMILY MEDICINE

## 2022-05-12 PROCEDURE — 36415 COLL VENOUS BLD VENIPUNCTURE: CPT | Performed by: FAMILY MEDICINE

## 2022-05-12 PROCEDURE — 87186 SC STD MICRODIL/AGAR DIL: CPT | Performed by: FAMILY MEDICINE

## 2022-05-12 PROCEDURE — 83036 HEMOGLOBIN GLYCOSYLATED A1C: CPT | Performed by: FAMILY MEDICINE

## 2022-05-12 PROCEDURE — 81001 URINALYSIS AUTO W/SCOPE: CPT | Performed by: FAMILY MEDICINE

## 2022-05-12 PROCEDURE — 85025 COMPLETE CBC W/AUTO DIFF WBC: CPT | Performed by: FAMILY MEDICINE

## 2022-05-12 PROCEDURE — 80053 COMPREHEN METABOLIC PANEL: CPT | Performed by: FAMILY MEDICINE

## 2022-05-12 NOTE — PROGRESS NOTES
"Chief Complaint  Diabetes    Subjective          Kennedy Lin presents to [facility]  --TOLERATING BLOOD PRESSURE MEDICATION WITHOUT APPARENT SIDE EFFECTS  --LAST HGA1C WAS 5.8 %, DOES NOT CHECK SUGARS AT HOME  --FOR THE PAST FEW MONTHS HE HAS BEEN SOA WITH EXERTION BUT RECOVERS QUICKLY  --ALSO HAS NOCTURIA, HESITANCY AND SLOW STREAM  --THE ABDOMINAL WALL SEROMA IS IMPROVING.  DID NOT HAVE SURGERY          No Known Allergies     Health Maintenance Due   Topic Date Due   • COLORECTAL CANCER SCREENING  Never done   • DIABETIC FOOT EXAM  Never done   • DIABETIC EYE EXAM  Never done   • HEMOGLOBIN A1C  05/11/2022        Current Outpatient Medications on File Prior to Visit   Medication Sig   • glipizide (GLUCOTROL XL) 10 MG 24 hr tablet Take 1 tablet by mouth Daily.   • telmisartan (MICARDIS) 80 MG tablet Take 1 tablet by mouth Daily.     No current facility-administered medications on file prior to visit.       Immunization History   Administered Date(s) Administered   • Fluad Quad 65+ 10/29/2020   • Fluzone High Dose =>65 Years ([employer] ONLY) 10/03/2018, 09/03/2019   • Hepatitis A 12/03/2018       Review of Systems   Constitutional: Negative for activity change, appetite change, chills, fatigue and fever.   HENT: Negative for congestion, ear pain, rhinorrhea and sore throat.    Respiratory: Negative for cough.    Cardiovascular: Negative for chest pain, palpitations and leg swelling.   Gastrointestinal: Negative for abdominal pain, constipation, diarrhea, nausea and vomiting.   Genitourinary: Negative for dysuria and hematuria.   Musculoskeletal: Negative for arthralgias and myalgias.   Neurological: Negative for headache.        Objective     /73 (BP Location: Left arm, Patient Position: Sitting)   Pulse 86   Temp 98.1 °F (36.7 °C) (Oral)   Ht 185.4 cm (73\")   Wt 123 kg (272 lb)   BMI 35.89 kg/m²       Physical Exam  Vitals and nursing note reviewed.   Constitutional: "       General: He is not in acute distress.     Appearance: Normal appearance.   Cardiovascular:      Rate and Rhythm: Normal rate and regular rhythm.      Heart sounds: Normal heart sounds. No murmur heard.  Pulmonary:      Effort: Pulmonary effort is normal.      Breath sounds: Normal breath sounds.   Abdominal:      Palpations: Abdomen is soft.      Tenderness: There is no abdominal tenderness.   Genitourinary:     Testes: Normal.      Prostate: Normal.      Rectum: Normal.   Musculoskeletal:      Cervical back: Neck supple.      Right lower leg: No edema.      Left lower leg: No edema.   Lymphadenopathy:      Cervical: No cervical adenopathy.   Neurological:      General: No focal deficit present.      Mental Status: He is alert.      Cranial Nerves: No cranial nerve deficit.      Coordination: Coordination normal.      Gait: Gait normal.   Psychiatric:         Mood and Affect: Mood normal.         Behavior: Behavior normal.         Result Review :                             Assessment and Plan      Diagnoses and all orders for this visit:    1. Type 2 diabetes mellitus without complication, without long-term current use of insulin (HCC) (Primary)  -     Hemoglobin A1c  -     Comprehensive Metabolic Panel  -     Lipid Panel    2. Abdominal wall seroma, initial encounter  Assessment & Plan:  IMPROVED WITH CURRENT TREATMENT, CONTINUE SAME, WILL REEVALUATE AT NEXT VISIT       3. Essential hypertension  Assessment & Plan:  Hypertension is improving with treatment.  Continue current treatment regimen.  Weight loss.  Continue current medications.  Blood pressure will be reassessed at the next regular appointment.    Orders:  -     CBC & Differential  -     Urinalysis With Culture If Indicated - Urine, Clean Catch  -     Urinalysis, Microscopic Only - Urine, Clean Catch  -     Urine Culture - Urine, Urine, Clean Catch    4. Type 2 diabetes mellitus treated without insulin (HCC)  Assessment & Plan:  1Diabetes is  improving with treatment.   Continue current treatment regimen.  Diabetes will be reassessed in 6 months.      5. Dyspnea on exertion  Assessment & Plan:  ECG SHOWS NSR BUT POSSIBLE ECTOPIC ATRIAL SOURCE OF P WAVE  CONCERN FOR ANGINA, WILL REFER TO CARDIOLOGY AND ADVISE HIM TO GO TO THE ER IF CHEST PAIN DEVELOPS IN THE MEANTIME.      Orders:  -     XR Chest PA & Lateral  -     Ambulatory Referral to Cardiology  -     ECG 12 Lead    6. Nocturia  Assessment & Plan:  PROBABLE BPH BUT WILL HOLD ON MEDS UNTIL AFTER CARDIAC EVAL             Follow Up     Return in about 3 months (around 8/12/2022).    Patient was given instructions and counseling regarding his condition or for health maintenance advice. Please see specific information pulled into the AVS if appropriate.

## 2022-05-12 NOTE — ASSESSMENT & PLAN NOTE
ECG SHOWS NSR BUT POSSIBLE ECTOPIC ATRIAL SOURCE OF P WAVE  CONCERN FOR ANGINA, WILL REFER TO CARDIOLOGY AND ADVISE HIM TO GO TO THE ER IF CHEST PAIN DEVELOPS IN THE MEANTIME.

## 2022-05-12 NOTE — PROGRESS NOTES
Procedure     ECG 12 Lead    Date/Time: 5/12/2022 12:34 PM  Performed by: Jamarcus Doss MD  Authorized by: Jamarcus Doss MD   Comparison: not compared with previous ECG   Rhythm: sinus rhythm  Rate: normal  Conduction: conduction normal  ST Segments: ST segments normal  T Waves: T waves normal  QRS axis: normal    Clinical impression: abnormal EKG  Comments: INVERTED P WAVES, SUGGESTIVE OF AN ECTOPIC ATRIAL FOCUS.  ALSO QRS IS S/W WIDENED WITHOUT AN APPARENT BLOCK OR HEMIBLOCK

## 2022-05-12 NOTE — ASSESSMENT & PLAN NOTE
1Diabetes is improving with treatment.   Continue current treatment regimen.  Diabetes will be reassessed in 6 months.

## 2022-05-14 LAB — BACTERIA SPEC AEROBE CULT: ABNORMAL

## 2022-05-18 DIAGNOSIS — N39.0 URINARY TRACT INFECTION WITHOUT HEMATURIA, SITE UNSPECIFIED: Primary | ICD-10-CM

## 2022-05-18 RX ORDER — CIPROFLOXACIN 500 MG/1
500 TABLET, FILM COATED ORAL 2 TIMES DAILY
Qty: 20 TABLET | Refills: 0 | Status: SHIPPED | OUTPATIENT
Start: 2022-05-18 | End: 2022-06-01 | Stop reason: HOSPADM

## 2022-05-24 ENCOUNTER — OFFICE VISIT (OUTPATIENT)
Dept: CARDIOLOGY | Facility: CLINIC | Age: 77
End: 2022-05-24

## 2022-05-24 ENCOUNTER — PREP FOR SURGERY (OUTPATIENT)
Dept: OTHER | Facility: HOSPITAL | Age: 77
End: 2022-05-24

## 2022-05-24 VITALS
SYSTOLIC BLOOD PRESSURE: 150 MMHG | HEIGHT: 74 IN | WEIGHT: 263 LBS | BODY MASS INDEX: 33.75 KG/M2 | DIASTOLIC BLOOD PRESSURE: 72 MMHG | HEART RATE: 68 BPM

## 2022-05-24 DIAGNOSIS — I10 HYPERTENSION, ESSENTIAL: ICD-10-CM

## 2022-05-24 DIAGNOSIS — R06.09 DYSPNEA ON EXERTION: Primary | ICD-10-CM

## 2022-05-24 DIAGNOSIS — I20.9 ANGINA PECTORIS: ICD-10-CM

## 2022-05-24 DIAGNOSIS — I20.9 ANGINA PECTORIS: Primary | ICD-10-CM

## 2022-05-24 DIAGNOSIS — R06.09 DYSPNEA ON EXERTION: ICD-10-CM

## 2022-05-24 PROCEDURE — 99204 OFFICE O/P NEW MOD 45 MIN: CPT | Performed by: INTERNAL MEDICINE

## 2022-05-24 RX ORDER — SODIUM CHLORIDE 0.9 % (FLUSH) 0.9 %
10 SYRINGE (ML) INJECTION EVERY 12 HOURS SCHEDULED
Status: CANCELLED | OUTPATIENT
Start: 2022-05-24

## 2022-05-24 RX ORDER — ASPIRIN 81 MG/1
81 TABLET ORAL DAILY
COMMUNITY

## 2022-05-24 RX ORDER — UBIDECARENONE 100 MG
100 CAPSULE ORAL DAILY
COMMUNITY

## 2022-05-24 RX ORDER — SODIUM CHLORIDE 0.9 % (FLUSH) 0.9 %
10 SYRINGE (ML) INJECTION AS NEEDED
Status: CANCELLED | OUTPATIENT
Start: 2022-05-24

## 2022-05-24 NOTE — PROGRESS NOTES
Chief Complaint  JOSE Lin presents to [facility] CARDIOLOGY  History of Present Illness    76-year-old white male.  He has no previous cardiac problems in the past.  He has hypertension, diabetes.  Over the past 8 months or so the patient has had progressive dyspnea on exertion with vague chest discomfort.  It seems to be getting worse over time and occurring with somewhat minimal activities.  It does improve with rest.  No associated palpitations.  He has not had a previous cardiac work-up.  EKG in the primary care office showed sinus rhythm, with no acute ST changes.    PMH  Past Medical History:   Diagnosis Date   • Allergic rhinitis    • Asthma    • Broken bones    • Coccygeal fracture (HCC)    • Diabetes mellitus, type 2 (HCC)    • Elevated hemoglobin A1c    • Encounter for Medicare annual wellness exam    • History of kidney stones    • Hyperlipidemia    • Hypertension    • Lumbar vertebral fracture (HCC)    • Malignant melanoma of skin (HCC)    • Obesity, Class I, BMI 30-34.9    • Post-nasal drainage    • Rash    • Recurrent nephrolithiasis    • Right rotator cuff tear    • Tobacco chew use    • Viral infection    • Vitamin D deficiency          SURGICALHX  Past Surgical History:   Procedure Laterality Date   • INGUINAL HERNIA REPAIR     • ROTATOR CUFF REPAIR     • SHOULDER SURGERY          SOC  Social History     Socioeconomic History   • Marital status:    Tobacco Use   • Smoking status: Former Smoker     Packs/day: 1.50     Years: 15.00     Pack years: 22.50     Types: Cigarettes     Start date: 1965     Quit date: 3/23/1986     Years since quittin.1   • Smokeless tobacco: Current User     Types: Chew   Vaping Use   • Vaping Use: Never used   Substance and Sexual Activity   • Alcohol use: No   • Drug use: No   • Sexual activity: Yes     Partners: Female         FAMHX  Family History   Problem Relation Age of Onset   • COPD Mother    •  "Other Father         Brain tumor    • Diabetes Maternal Grandmother    • Hypertension Paternal Grandfather    • Other Paternal Grandfather         acute myocardial infarction    • Colon cancer Neg Hx    • Prostate cancer Neg Hx    • Thyroid disease Neg Hx           ALLERGY  No Known Allergies     MEDSCURRENT    Current Outpatient Medications:   •  ciprofloxacin (Cipro) 500 MG tablet, Take 1 tablet by mouth 2 (Two) Times a Day., Disp: 20 tablet, Rfl: 0  •  coenzyme Q10 100 MG capsule, Take 100 mg by mouth Daily., Disp: , Rfl:   •  glipizide (GLUCOTROL XL) 10 MG 24 hr tablet, Take 1 tablet by mouth Daily., Disp: 90 tablet, Rfl: 3  •  telmisartan (MICARDIS) 80 MG tablet, Take 1 tablet by mouth Daily., Disp: 90 tablet, Rfl: 3      Review of Systems   Constitutional: Positive for malaise/fatigue.   HENT: Negative.    Eyes: Negative.    Cardiovascular: Positive for dyspnea on exertion. Negative for chest pain.        Vague chest tightness with exertion   Respiratory: Positive for shortness of breath.    Endocrine: Negative.    Hematologic/Lymphatic: Negative.    Skin: Negative.    Musculoskeletal: Negative.    Gastrointestinal: Negative.    Genitourinary: Negative.    Neurological: Negative.    Psychiatric/Behavioral: Negative.         Objective     /72   Pulse 68   Ht 188 cm (74\")   Wt 119 kg (263 lb)   BMI 33.77 kg/m²       General Appearance:   · well developed  · well nourished, obese  HENT:   · oropharynx moist  · lips not cyanotic  Neck:  · thyroid not enlarged  · supple  Respiratory:  · no respiratory distress  · normal breath sounds  · no rales  Cardiovascular:  · no jugular venous distention  · regular rhythm  · apical impulse normal  · S1 normal, S2 normal  · no S3, no S4   · no murmur  · no rub, no thrill  · carotid pulses normal; no bruit  · pedal pulses normal  · lower extremity edema: 1+  Musculoskeletal:  · no clubbing of fingers.   · normocephalic, head atraumatic  Skin:   · warm, " dry  Psychiatric:  · judgement and insight appropriate  · normal mood and affect      Result Review :             Data reviewed: Primary care records reviewed, EKG reviewed by me as above, chest x-ray May 12 was negative, LDL 86, creatinine 1.2     Procedures             Assessment and Plan        ASSESSMENT:  Encounter Diagnoses   Name Primary?   • Dyspnea on exertion Yes   • Angina pectoris (HCC)    • Hypertension, essential          PLAN:    1.  Mr. Lin has progressive dyspnea on exertion with vague chest discomfort, symptoms overall with age and risk factors are likely angina.  Relatively high suspicion of underlying coronary artery disease.  I discussed stress imaging versus coronary angiography with the patient.  After thorough discussion we have elected to proceed with coronary angiography.  Risks and benefits discussed.  Possible need for medical therapy, PCI, or surgical referral was discussed.  The patient is agreeable to proceed.  I am adding aspirin daily.  An echocardiogram will be scheduled the same day as his cardiac catheterization.  Office follow-up will be arranged pending his angiographic result  2.  Hypertension-mildly uncontrolled today, it was 110/73 on recent primary care visit            Patient was given instructions and counseling regarding his condition or for health maintenance advice. Please see specific information pulled into the AVS if appropriate.             ROBINSON Gill MD  5/24/2022    15:52 EDT

## 2022-05-25 PROBLEM — I20.9 ANGINA PECTORIS: Status: ACTIVE | Noted: 2022-05-25

## 2022-06-01 ENCOUNTER — HOSPITAL ENCOUNTER (OUTPATIENT)
Dept: CARDIOLOGY | Facility: HOSPITAL | Age: 77
Discharge: HOME | End: 2022-06-01

## 2022-06-01 ENCOUNTER — HOSPITAL ENCOUNTER (OUTPATIENT)
Facility: HOSPITAL | Age: 77
Setting detail: HOSPITAL OUTPATIENT SURGERY
Discharge: HOME | End: 2022-06-01
Attending: INTERNAL MEDICINE | Admitting: INTERNAL MEDICINE

## 2022-06-01 VITALS
SYSTOLIC BLOOD PRESSURE: 173 MMHG | DIASTOLIC BLOOD PRESSURE: 79 MMHG | RESPIRATION RATE: 18 BRPM | HEART RATE: 60 BPM | OXYGEN SATURATION: 97 %

## 2022-06-01 DIAGNOSIS — R06.09 DYSPNEA ON EXERTION: ICD-10-CM

## 2022-06-01 DIAGNOSIS — I20.9 ANGINA PECTORIS: ICD-10-CM

## 2022-06-01 LAB
ANION GAP SERPL CALCULATED.3IONS-SCNC: 9.7 MMOL/L (ref 5–15)
BH CV ECHO MEAS - AO ROOT DIAM: 3.4 CM
BH CV ECHO MEAS - EF(MOD-BP): 60 %
BH CV ECHO MEAS - IVSD: 1.1 CM
BH CV ECHO MEAS - LA DIMENSION: 3.5 CM
BH CV ECHO MEAS - LAT PEAK E' VEL: 5 CM/SEC
BH CV ECHO MEAS - LVIDD: 4.9 CM
BH CV ECHO MEAS - LVIDS: 3.2 CM
BH CV ECHO MEAS - LVPWD: 1.1 CM
BH CV ECHO MEAS - MED PEAK E' VEL: 8 CM/SEC
BH CV ECHO MEAS - MV A MAX VEL: 103 CM/SEC
BH CV ECHO MEAS - MV DEC TIME: 178 MSEC
BH CV ECHO MEAS - MV E MAX VEL: 70 CM/SEC
BH CV ECHO MEAS - MV E/A: 0.7
BH CV ECHO MEASUREMENTS AVERAGE E/E' RATIO: 10.77
BUN SERPL-MCNC: 14 MG/DL (ref 8–23)
BUN/CREAT SERPL: 13.7 (ref 7–25)
CALCIUM SPEC-SCNC: 9.2 MG/DL (ref 8.6–10.5)
CHLORIDE SERPL-SCNC: 103 MMOL/L (ref 98–107)
CO2 SERPL-SCNC: 25.3 MMOL/L (ref 22–29)
CREAT SERPL-MCNC: 1.02 MG/DL (ref 0.76–1.27)
DEPRECATED RDW RBC AUTO: 44.9 FL (ref 37–54)
EGFRCR SERPLBLD CKD-EPI 2021: 76.6 ML/MIN/1.73
ERYTHROCYTE [DISTWIDTH] IN BLOOD BY AUTOMATED COUNT: 13.5 % (ref 12.3–15.4)
GLUCOSE SERPL-MCNC: 133 MG/DL (ref 65–99)
HCT VFR BLD AUTO: 43.1 % (ref 37.5–51)
HGB BLD-MCNC: 14.2 G/DL (ref 13–17.7)
INR PPP: 1.17 (ref 0.86–1.15)
IVRT: 58 MSEC
LEFT ATRIUM VOLUME INDEX: 23 ML/M2
MAXIMAL PREDICTED HEART RATE: 145 BPM
MCH RBC QN AUTO: 29.9 PG (ref 26.6–33)
MCHC RBC AUTO-ENTMCNC: 32.9 G/DL (ref 31.5–35.7)
MCV RBC AUTO: 90.7 FL (ref 79–97)
PLATELET # BLD AUTO: 155 10*3/MM3 (ref 140–450)
PMV BLD AUTO: 11 FL (ref 6–12)
POTASSIUM SERPL-SCNC: 4 MMOL/L (ref 3.5–5.2)
PROTHROMBIN TIME: 15.1 SECONDS (ref 11.8–14.9)
QT INTERVAL: 424 MS
RBC # BLD AUTO: 4.75 10*6/MM3 (ref 4.14–5.8)
SODIUM SERPL-SCNC: 138 MMOL/L (ref 136–145)
STRESS TARGET HR: 123 BPM
WBC NRBC COR # BLD: 6.79 10*3/MM3 (ref 3.4–10.8)

## 2022-06-01 PROCEDURE — C1769 GUIDE WIRE: HCPCS | Performed by: INTERNAL MEDICINE

## 2022-06-01 PROCEDURE — 93454 CORONARY ARTERY ANGIO S&I: CPT | Performed by: INTERNAL MEDICINE

## 2022-06-01 PROCEDURE — 85027 COMPLETE CBC AUTOMATED: CPT | Performed by: INTERNAL MEDICINE

## 2022-06-01 PROCEDURE — 85610 PROTHROMBIN TIME: CPT | Performed by: INTERNAL MEDICINE

## 2022-06-01 PROCEDURE — 25010000002 FENTANYL CITRATE (PF) 100 MCG/2ML SOLUTION: Performed by: INTERNAL MEDICINE

## 2022-06-01 PROCEDURE — 25010000002 HYDRALAZINE PER 20 MG: Performed by: INTERNAL MEDICINE

## 2022-06-01 PROCEDURE — C1894 INTRO/SHEATH, NON-LASER: HCPCS | Performed by: INTERNAL MEDICINE

## 2022-06-01 PROCEDURE — 99152 MOD SED SAME PHYS/QHP 5/>YRS: CPT | Performed by: INTERNAL MEDICINE

## 2022-06-01 PROCEDURE — 93306 TTE W/DOPPLER COMPLETE: CPT | Performed by: INTERNAL MEDICINE

## 2022-06-01 PROCEDURE — C1760 CLOSURE DEV, VASC: HCPCS | Performed by: INTERNAL MEDICINE

## 2022-06-01 PROCEDURE — 99153 MOD SED SAME PHYS/QHP EA: CPT | Performed by: INTERNAL MEDICINE

## 2022-06-01 PROCEDURE — 0 MEPERIDINE PER 100 MG: Performed by: INTERNAL MEDICINE

## 2022-06-01 PROCEDURE — 93306 TTE W/DOPPLER COMPLETE: CPT

## 2022-06-01 PROCEDURE — 80048 BASIC METABOLIC PNL TOTAL CA: CPT | Performed by: INTERNAL MEDICINE

## 2022-06-01 PROCEDURE — 25010000002 MIDAZOLAM PER 1 MG: Performed by: INTERNAL MEDICINE

## 2022-06-01 PROCEDURE — 0 IOPAMIDOL PER 1 ML: Performed by: INTERNAL MEDICINE

## 2022-06-01 PROCEDURE — 93005 ELECTROCARDIOGRAM TRACING: CPT | Performed by: INTERNAL MEDICINE

## 2022-06-01 RX ORDER — VERAPAMIL HYDROCHLORIDE 2.5 MG/ML
INJECTION, SOLUTION INTRAVENOUS AS NEEDED
Status: DISCONTINUED | OUTPATIENT
Start: 2022-06-01 | End: 2022-06-01 | Stop reason: HOSPADM

## 2022-06-01 RX ORDER — ROSUVASTATIN CALCIUM 20 MG/1
20 TABLET, COATED ORAL DAILY
Qty: 90 TABLET | Refills: 3 | Status: SHIPPED | OUTPATIENT
Start: 2022-06-01

## 2022-06-01 RX ORDER — HYDROCHLOROTHIAZIDE 25 MG/1
25 TABLET ORAL DAILY
Qty: 90 TABLET | Refills: 3 | Status: SHIPPED | OUTPATIENT
Start: 2022-06-01

## 2022-06-01 RX ORDER — ACETAMINOPHEN 325 MG/1
650 TABLET ORAL EVERY 4 HOURS PRN
Status: CANCELLED | OUTPATIENT
Start: 2022-06-01

## 2022-06-01 RX ORDER — SODIUM CHLORIDE 0.9 % (FLUSH) 0.9 %
10 SYRINGE (ML) INJECTION EVERY 12 HOURS SCHEDULED
Status: DISCONTINUED | OUTPATIENT
Start: 2022-06-01 | End: 2022-06-01 | Stop reason: HOSPADM

## 2022-06-01 RX ORDER — FENTANYL CITRATE 50 UG/ML
INJECTION, SOLUTION INTRAMUSCULAR; INTRAVENOUS AS NEEDED
Status: DISCONTINUED | OUTPATIENT
Start: 2022-06-01 | End: 2022-06-01 | Stop reason: HOSPADM

## 2022-06-01 RX ORDER — LIDOCAINE HYDROCHLORIDE 20 MG/ML
INJECTION, SOLUTION INFILTRATION; PERINEURAL AS NEEDED
Status: DISCONTINUED | OUTPATIENT
Start: 2022-06-01 | End: 2022-06-01 | Stop reason: HOSPADM

## 2022-06-01 RX ORDER — HYDRALAZINE HYDROCHLORIDE 20 MG/ML
INJECTION INTRAMUSCULAR; INTRAVENOUS AS NEEDED
Status: DISCONTINUED | OUTPATIENT
Start: 2022-06-01 | End: 2022-06-01 | Stop reason: HOSPADM

## 2022-06-01 RX ORDER — MIDAZOLAM HYDROCHLORIDE 1 MG/ML
INJECTION INTRAMUSCULAR; INTRAVENOUS AS NEEDED
Status: DISCONTINUED | OUTPATIENT
Start: 2022-06-01 | End: 2022-06-01 | Stop reason: HOSPADM

## 2022-06-01 RX ORDER — RANOLAZINE 500 MG/1
500 TABLET, EXTENDED RELEASE ORAL 2 TIMES DAILY
Qty: 180 TABLET | Refills: 3 | Status: SHIPPED | OUTPATIENT
Start: 2022-06-01

## 2022-06-01 RX ORDER — SODIUM CHLORIDE 0.9 % (FLUSH) 0.9 %
10 SYRINGE (ML) INJECTION AS NEEDED
Status: DISCONTINUED | OUTPATIENT
Start: 2022-06-01 | End: 2022-06-01 | Stop reason: HOSPADM

## 2022-06-01 RX ORDER — MEPERIDINE HYDROCHLORIDE 25 MG/ML
INJECTION INTRAMUSCULAR; INTRAVENOUS; SUBCUTANEOUS AS NEEDED
Status: DISCONTINUED | OUTPATIENT
Start: 2022-06-01 | End: 2022-06-01 | Stop reason: HOSPADM

## 2022-06-15 ENCOUNTER — TELEPHONE (OUTPATIENT)
Dept: FAMILY MEDICINE CLINIC | Age: 77
End: 2022-06-15

## 2022-06-15 NOTE — TELEPHONE ENCOUNTER
----- Message from Francisca Baron LPN sent at 6/13/2022 11:46 AM EDT -----      ----- Message -----  From: SYSTEM  Sent: 6/13/2022   1:24 AM EDT  To: [facility] [place] [facility] [place]

## 2022-06-21 ENCOUNTER — OFFICE VISIT (OUTPATIENT)
Dept: CARDIOLOGY | Facility: CLINIC | Age: 77
End: 2022-06-21

## 2022-06-21 ENCOUNTER — LAB (OUTPATIENT)
Dept: LAB | Facility: HOSPITAL | Age: 77
End: 2022-06-21

## 2022-06-21 VITALS
SYSTOLIC BLOOD PRESSURE: 133 MMHG | HEIGHT: 74 IN | BODY MASS INDEX: 34.01 KG/M2 | HEART RATE: 70 BPM | DIASTOLIC BLOOD PRESSURE: 80 MMHG | WEIGHT: 265 LBS

## 2022-06-21 DIAGNOSIS — I10 HYPERTENSION, ESSENTIAL: ICD-10-CM

## 2022-06-21 DIAGNOSIS — R06.09 DYSPNEA ON EXERTION: Primary | ICD-10-CM

## 2022-06-21 DIAGNOSIS — I25.10 CORONARY ARTERY DISEASE INVOLVING NATIVE CORONARY ARTERY OF NATIVE HEART WITHOUT ANGINA PECTORIS: ICD-10-CM

## 2022-06-21 DIAGNOSIS — N39.0 URINARY TRACT INFECTION WITHOUT HEMATURIA, SITE UNSPECIFIED: ICD-10-CM

## 2022-06-21 LAB
BACTERIA UR QL AUTO: ABNORMAL /HPF
BILIRUB UR QL STRIP: NEGATIVE
CLARITY UR: CLEAR
COLOR UR: YELLOW
GLUCOSE UR STRIP-MCNC: NEGATIVE MG/DL
HGB UR QL STRIP.AUTO: ABNORMAL
KETONES UR QL STRIP: NEGATIVE
LEUKOCYTE ESTERASE UR QL STRIP.AUTO: NEGATIVE
NITRITE UR QL STRIP: NEGATIVE
PH UR STRIP.AUTO: 6 [PH] (ref 5–8)
PROT UR QL STRIP: NEGATIVE
RBC # UR STRIP: ABNORMAL /HPF
REF LAB TEST METHOD: ABNORMAL
SP GR UR STRIP: 1.01 (ref 1–1.03)
SQUAMOUS #/AREA URNS HPF: ABNORMAL /HPF
UROBILINOGEN UR QL STRIP: ABNORMAL
WBC # UR STRIP: ABNORMAL /HPF

## 2022-06-21 PROCEDURE — 99213 OFFICE O/P EST LOW 20 MIN: CPT | Performed by: INTERNAL MEDICINE

## 2022-06-21 PROCEDURE — 81001 URINALYSIS AUTO W/SCOPE: CPT

## 2022-06-21 NOTE — PROGRESS NOTES
Chief Complaint  Post Heart Cath    Subjective            Kennedy Lin presents to [facility] CARDIOLOGY  History of Present Illness    Mr. Lin is here for follow-up evaluation management of coronary artery disease, dyspnea, hypertension.  He recently had a cardiac catheterization which showed 30% LAD, 80% small circumflex branch, normal RCA which is managed medically.  His blood pressure was quite high that day and adjustments were made in his medications.  Overall he is feeling better.  He is trying to walk more he is less short of breath.  He is compliant with his medications.  No chest pain.    PMH  Past Medical History:   Diagnosis Date   • Allergic rhinitis    • Asthma    • Broken bones    • Coccygeal fracture (HCC)    • Diabetes mellitus, type 2 (HCC)    • Elevated hemoglobin A1c    • Encounter for Medicare annual wellness exam    • History of kidney stones    • Hyperlipidemia    • Hypertension    • Lumbar vertebral fracture (HCC)    • Malignant melanoma of skin (HCC)    • Obesity, Class I, BMI 30-34.9    • Post-nasal drainage    • Rash    • Recurrent nephrolithiasis    • Right rotator cuff tear    • Tobacco chew use    • Viral infection    • Vitamin D deficiency          SURGICALHX  Past Surgical History:   Procedure Laterality Date   • CARDIAC CATHETERIZATION N/A 2022    Procedure: Left Heart Cath-Dr. Gill wants to do this to follow his device case;  Surgeon: ROBINSON Gill MD;  Location: [facility] CATH INVASIVE LOCATION;  Service: Cardiology;  Laterality: N/A;   • INGUINAL HERNIA REPAIR     • ROTATOR CUFF REPAIR     • SHOULDER SURGERY          SOC  Social History     Socioeconomic History   • Marital status:    Tobacco Use   • Smoking status: Former Smoker     Packs/day: 1.50     Years: 15.00     Pack years: 22.50     Types: Cigarettes     Start date: 1965     Quit date: 3/23/1986     Years since quittin.2   • Smokeless tobacco: Current User     Types: Chew  "  Vaping Use   • Vaping Use: Never used   Substance and Sexual Activity   • Alcohol use: Yes     Alcohol/week: 1.0 standard drink     Types: 1 Cans of beer per week   • Drug use: No   • Sexual activity: Yes     Partners: Female         FAMHX  Family History   Problem Relation Age of Onset   • COPD Mother    • Other Father         Brain tumor    • Diabetes Maternal Grandmother    • Hypertension Paternal Grandfather    • Other Paternal Grandfather         acute myocardial infarction    • Colon cancer Neg Hx    • Prostate cancer Neg Hx    • Thyroid disease Neg Hx           ALLERGY  No Known Allergies     MEDSCURRENT    Current Outpatient Medications:   •  coenzyme Q10 100 MG capsule, Take 100 mg by mouth Daily., Disp: , Rfl:   •  glipizide (GLUCOTROL XL) 10 MG 24 hr tablet, Take 1 tablet by mouth Daily., Disp: 90 tablet, Rfl: 3  •  hydroCHLOROthiazide (HYDRODIURIL) 25 MG tablet, Take 1 tablet by mouth Daily., Disp: 90 tablet, Rfl: 3  •  ranolazine (Ranexa) 500 MG 12 hr tablet, Take 1 tablet by mouth 2 (Two) Times a Day., Disp: 180 tablet, Rfl: 3  •  rosuvastatin (CRESTOR) 20 MG tablet, Take 1 tablet by mouth Daily., Disp: 90 tablet, Rfl: 3  •  telmisartan (MICARDIS) 80 MG tablet, Take 1 tablet by mouth Daily., Disp: 90 tablet, Rfl: 3  •  aspirin 81 MG EC tablet, Take 81 mg by mouth Daily., Disp: , Rfl:       Review of Systems   Cardiovascular: Negative for chest pain and dyspnea on exertion.   Respiratory: Positive for shortness of breath.         Objective     /80   Pulse 70   Ht 188 cm (74\")   Wt 120 kg (265 lb)   BMI 34.02 kg/m²       General Appearance:   · well developed  · well nourished  HENT:   · oropharynx moist  · lips not cyanotic  Neck:  · thyroid not enlarged  · supple  Respiratory:  · no respiratory distress  · normal breath sounds  · no rales  Cardiovascular:  · no jugular venous distention  · regular rhythm  · apical impulse normal  · S1 normal, S2 normal  · no S3, no S4   · no murmur  · no " rub, no thrill  · carotid pulses normal; no bruit  · pedal pulses normal  · lower extremity edema: trace    Musculoskeletal:  · no clubbing of fingers.   · normocephalic, head atraumatic  Skin:   · warm, dry  Psychiatric:  · judgement and insight appropriate  · normal mood and affect      Result Review :                  Procedures               Assessment and Plan        ASSESSMENT:  Encounter Diagnoses   Name Primary?   • Dyspnea on exertion Yes   • Hypertension, essential    • Coronary artery disease involving native coronary artery of native heart without angina pectoris          PLAN:    1.  Dyspnea on exertion, improved with controlled blood pressure.  Continue current medical regimen.  2.  Hypertension, much improved, continue efforts to lose weight and reduce dietary sodium  3.  Coronary artery disease, managed medically, continue antianginal therapy, aspirin, statin    Follow-up in 4 months otherwise.          Patient was given instructions and counseling regarding his condition or for health maintenance advice. Please see specific information pulled into the AVS if appropriate.             ROBINSON Gill MD  6/21/2022    14:52 EDT

## 2022-08-15 ENCOUNTER — LAB (OUTPATIENT)
Dept: LAB | Facility: HOSPITAL | Age: 77
End: 2022-08-15

## 2022-08-15 ENCOUNTER — OFFICE VISIT (OUTPATIENT)
Dept: FAMILY MEDICINE CLINIC | Age: 77
End: 2022-08-15

## 2022-08-15 VITALS
WEIGHT: 270.6 LBS | DIASTOLIC BLOOD PRESSURE: 66 MMHG | SYSTOLIC BLOOD PRESSURE: 149 MMHG | HEIGHT: 74 IN | BODY MASS INDEX: 34.73 KG/M2 | HEART RATE: 60 BPM

## 2022-08-15 DIAGNOSIS — E11.9 TYPE 2 DIABETES MELLITUS TREATED WITHOUT INSULIN: ICD-10-CM

## 2022-08-15 DIAGNOSIS — I10 ESSENTIAL HYPERTENSION: ICD-10-CM

## 2022-08-15 DIAGNOSIS — I25.10 CORONARY ARTERY DISEASE INVOLVING NATIVE CORONARY ARTERY OF NATIVE HEART WITHOUT ANGINA PECTORIS: Primary | ICD-10-CM

## 2022-08-15 DIAGNOSIS — R53.83 OTHER FATIGUE: ICD-10-CM

## 2022-08-15 DIAGNOSIS — R35.1 NOCTURIA: ICD-10-CM

## 2022-08-15 PROBLEM — R06.09 DYSPNEA ON EXERTION: Status: RESOLVED | Noted: 2022-05-12 | Resolved: 2022-08-15

## 2022-08-15 PROBLEM — I20.9 ANGINA PECTORIS: Status: RESOLVED | Noted: 2022-05-25 | Resolved: 2022-08-15

## 2022-08-15 LAB
ALBUMIN SERPL-MCNC: 4.1 G/DL (ref 3.5–5.2)
ALBUMIN/GLOB SERPL: 1.6 G/DL
ALP SERPL-CCNC: 64 U/L (ref 39–117)
ALT SERPL W P-5'-P-CCNC: 17 U/L (ref 1–41)
ANION GAP SERPL CALCULATED.3IONS-SCNC: 10.3 MMOL/L (ref 5–15)
AST SERPL-CCNC: 13 U/L (ref 1–40)
BILIRUB SERPL-MCNC: 0.4 MG/DL (ref 0–1.2)
BUN SERPL-MCNC: 18 MG/DL (ref 8–23)
BUN/CREAT SERPL: 13.6 (ref 7–25)
CALCIUM SPEC-SCNC: 9.2 MG/DL (ref 8.6–10.5)
CHLORIDE SERPL-SCNC: 101 MMOL/L (ref 98–107)
CHOLEST SERPL-MCNC: 88 MG/DL (ref 0–200)
CO2 SERPL-SCNC: 26.7 MMOL/L (ref 22–29)
CREAT SERPL-MCNC: 1.32 MG/DL (ref 0.76–1.27)
EGFRCR SERPLBLD CKD-EPI 2021: 56.2 ML/MIN/1.73
GLOBULIN UR ELPH-MCNC: 2.6 GM/DL
GLUCOSE SERPL-MCNC: 119 MG/DL (ref 65–99)
HBA1C MFR BLD: 6.8 % (ref 4.8–5.6)
HDLC SERPL-MCNC: 29 MG/DL (ref 40–60)
LDLC SERPL CALC-MCNC: 38 MG/DL (ref 0–100)
LDLC/HDLC SERPL: 1.26 {RATIO}
POTASSIUM SERPL-SCNC: 4.1 MMOL/L (ref 3.5–5.2)
PROT SERPL-MCNC: 6.7 G/DL (ref 6–8.5)
SODIUM SERPL-SCNC: 138 MMOL/L (ref 136–145)
TESTOST SERPL-MCNC: 225 NG/DL (ref 193–740)
TRIGL SERPL-MCNC: 112 MG/DL (ref 0–150)
VLDLC SERPL-MCNC: 21 MG/DL (ref 5–40)

## 2022-08-15 PROCEDURE — 80061 LIPID PANEL: CPT | Performed by: FAMILY MEDICINE

## 2022-08-15 PROCEDURE — 80053 COMPREHEN METABOLIC PANEL: CPT | Performed by: FAMILY MEDICINE

## 2022-08-15 PROCEDURE — 83036 HEMOGLOBIN GLYCOSYLATED A1C: CPT | Performed by: FAMILY MEDICINE

## 2022-08-15 PROCEDURE — 36415 COLL VENOUS BLD VENIPUNCTURE: CPT | Performed by: FAMILY MEDICINE

## 2022-08-15 PROCEDURE — 99214 OFFICE O/P EST MOD 30 MIN: CPT | Performed by: FAMILY MEDICINE

## 2022-08-15 PROCEDURE — 84403 ASSAY OF TOTAL TESTOSTERONE: CPT | Performed by: FAMILY MEDICINE

## 2022-08-15 NOTE — ASSESSMENT & PLAN NOTE
Coronary artery disease is newly identified.  Continue current treatment regimen.  Weight loss.  Regular aerobic exercise.  Continue current medications.  Cardiac status will be reassessed in 6 months   PLANS PER CARDIOLOGY (OFFICE NOTES AND CATH REPORT REVIEWED) .

## 2022-08-15 NOTE — PROGRESS NOTES
Chief Complaint  Diabetes    Subjective          Kennedy Lin presents to [facility]  --SAW CARDIOLOGY FOR THE GARCIA AND ABNORMAL ECG.  CATH REVEALED MODERATE DISEASE BUT INSTRUMENTATION WAS INDICATED.  FEELING BETTER WITH DIET AND EXERCISE EFFORTS.  WILL SEE CARDIOLOGY AGAIN IN SIX MONTHS FOR REPEAT TESTING.  --TOLERATING BLOOD PRESSURE MEDICATION WITHOUT APPARENT SIDE EFFECTS  --HGA1C WAS 6.5 % ON CURRENT MEDS  --THE NOCTURIA HAS IMPROVED SUBSTANTIALLY  --RELATES A FAIR AMOUNT OF FATIGUE AND IS ASKING TO HAVE A TESTOSTERONE LEVEL DONE         No Known Allergies     Health Maintenance Due   Topic Date Due   • COLORECTAL CANCER SCREENING  Never done   • DIABETIC FOOT EXAM  Never done   • DIABETIC EYE EXAM  Never done        Current Outpatient Medications on File Prior to Visit   Medication Sig   • aspirin 81 MG EC tablet Take 81 mg by mouth Daily.   • coenzyme Q10 100 MG capsule Take 100 mg by mouth Daily.   • glipizide (GLUCOTROL XL) 10 MG 24 hr tablet Take 1 tablet by mouth Daily.   • hydroCHLOROthiazide (HYDRODIURIL) 25 MG tablet Take 1 tablet by mouth Daily.   • ranolazine (Ranexa) 500 MG 12 hr tablet Take 1 tablet by mouth 2 (Two) Times a Day.   • rosuvastatin (CRESTOR) 20 MG tablet Take 1 tablet by mouth Daily.   • telmisartan (MICARDIS) 80 MG tablet Take 1 tablet by mouth Daily.     No current facility-administered medications on file prior to visit.       Immunization History   Administered Date(s) Administered   • Fluad Quad 65+ 10/29/2020   • Fluzone High Dose =>65 Years ([employer] ONLY) 10/03/2018, 09/03/2019   • Hepatitis A 12/03/2018       Review of Systems   Constitutional: Positive for fatigue. Negative for activity change, appetite change, chills and fever.   HENT: Negative for congestion, ear pain, rhinorrhea and sore throat.    Respiratory: Negative for cough and shortness of breath.    Cardiovascular: Negative for chest pain, palpitations and leg swelling.  "  Gastrointestinal: Negative for abdominal pain, constipation, diarrhea, nausea and vomiting.   Genitourinary: Negative for dysuria and hematuria.   Musculoskeletal: Negative for arthralgias and myalgias.   Neurological: Negative for headache.        Objective     /66 (BP Location: Left arm, Patient Position: Sitting)   Pulse 60   Ht 188 cm (74\")   Wt 123 kg (270 lb 9.6 oz)   BMI 34.74 kg/m²       Physical Exam  Vitals and nursing note reviewed.   Constitutional:       General: He is not in acute distress.     Appearance: Normal appearance.   Cardiovascular:      Rate and Rhythm: Normal rate and regular rhythm.      Heart sounds: Normal heart sounds. No murmur heard.  Pulmonary:      Effort: Pulmonary effort is normal.      Breath sounds: Normal breath sounds.   Abdominal:      Palpations: Abdomen is soft.      Tenderness: There is no abdominal tenderness.   Musculoskeletal:      Cervical back: Neck supple.      Right lower leg: No edema.      Left lower leg: No edema.   Lymphadenopathy:      Cervical: No cervical adenopathy.   Neurological:      General: No focal deficit present.      Mental Status: He is alert.      Cranial Nerves: No cranial nerve deficit.      Coordination: Coordination normal.      Gait: Gait normal.   Psychiatric:         Mood and Affect: Mood normal.         Behavior: Behavior normal.         Result Review :                             Assessment and Plan      Diagnoses and all orders for this visit:    1. Coronary artery disease (moderate per cath but not stented)  (Primary)  Assessment & Plan:  Coronary artery disease is newly identified.  Continue current treatment regimen.  Weight loss.  Regular aerobic exercise.  Continue current medications.  Cardiac status will be reassessed in 6 months   PLANS PER CARDIOLOGY (OFFICE NOTES AND CATH REPORT REVIEWED) .      2. Essential hypertension  Assessment & Plan:  Hypertension is improving with treatment.  Continue current treatment " regimen.  Weight loss.  Regular aerobic exercise.  Continue current medications.  Blood pressure will be reassessed at the next regular appointment.      3. Type 2 diabetes mellitus treated without insulin (HCC)  Assessment & Plan:  Diabetes is improving with treatment.   Continue current treatment regimen.  Diabetes will be reassessed in 6 months.    Orders:  -     Comprehensive Metabolic Panel  -     Hemoglobin A1c  -     Lipid Panel    4. Other fatigue  Comments:  NONSPECIFIC, PROBABLY RELATED TO MEDS AND DECONDITIONING.  PER HIS REQUEST WILL CHECK TESTOSTERONE  BUT ADVISED THAT THIS LEVEL NORMALLY DECREASES WITH AGE  Orders:  -     Testosterone    5. Nocturia  Comments:  OBSERVE AS IMPROVED          Follow Up     Return in about 6 months (around 2/15/2023).    Patient was given instructions and counseling regarding his condition or for health maintenance advice. Please see specific information pulled into the AVS if appropriate.       Answers for HPI/ROS submitted by the patient on 8/8/2022  What is the primary reason for your visit?: Other  Please describe your symptoms.: Regular check up  Have you had these symptoms before?: Yes  How long have you been having these symptoms?: Greater than 2 weeks

## 2022-08-16 DIAGNOSIS — E11.9 TYPE 2 DIABETES MELLITUS TREATED WITHOUT INSULIN: Primary | ICD-10-CM

## 2022-10-25 ENCOUNTER — OFFICE VISIT (OUTPATIENT)
Dept: CARDIOLOGY | Facility: CLINIC | Age: 77
End: 2022-10-25

## 2022-10-25 VITALS
HEIGHT: 74 IN | WEIGHT: 274 LBS | DIASTOLIC BLOOD PRESSURE: 80 MMHG | BODY MASS INDEX: 35.16 KG/M2 | SYSTOLIC BLOOD PRESSURE: 137 MMHG | HEART RATE: 69 BPM

## 2022-10-25 DIAGNOSIS — I25.10 CORONARY ARTERY DISEASE INVOLVING NATIVE CORONARY ARTERY OF NATIVE HEART WITHOUT ANGINA PECTORIS: Primary | ICD-10-CM

## 2022-10-25 DIAGNOSIS — R06.09 DYSPNEA ON EXERTION: ICD-10-CM

## 2022-10-25 DIAGNOSIS — I10 HYPERTENSION, ESSENTIAL: ICD-10-CM

## 2022-10-25 PROCEDURE — 99214 OFFICE O/P EST MOD 30 MIN: CPT | Performed by: INTERNAL MEDICINE

## 2022-10-25 NOTE — PROGRESS NOTES
Chief Complaint  Shortness of Breath    Subjective            Kennedy Lin presents to [facility] CARDIOLOGY  Shortness of Breath  Pertinent negatives include no chest pain.       Mr. Lin is here for follow-up evaluation management of coronary artery disease, dyspnea, hypertension.  He recently had a cardiac catheterization which showed 30% LAD, 80% small circumflex branch, normal RCA which is managed medically.  Since last visit overall he is doing better.  He is walking further with less symptoms.  He denies chest pain.  He is compliant with his medical therapy.    [facility]  Past Medical History:   Diagnosis Date   • Allergic rhinitis    • Asthma    • Broken bones    • Coccygeal fracture (HCC)    • Coronary artery disease    • Diabetes mellitus, type 2 (HCC)    • Elevated hemoglobin A1c    • Encounter for Medicare annual wellness exam    • History of kidney stones    • Hyperlipidemia    • Hypertension    • Lumbar vertebral fracture (HCC)    • Malignant melanoma of skin (HCC)    • Obesity, Class I, BMI 30-34.9    • Post-nasal drainage    • Rash    • Recurrent nephrolithiasis    • Right rotator cuff tear    • Tobacco chew use    • Viral infection    • Vitamin D deficiency          SURGICALHX  Past Surgical History:   Procedure Laterality Date   • CARDIAC CATHETERIZATION N/A 2022    Procedure: Left Heart Cath-Dr. Gill wants to do this to follow his device case;  Surgeon: ROBINSON Gill MD;  Location: [facility] CATH INVASIVE LOCATION;  Service: Cardiology;  Laterality: N/A;   • INGUINAL HERNIA REPAIR     • ROTATOR CUFF REPAIR     • SHOULDER SURGERY          SOC  Social History     Socioeconomic History   • Marital status:    Tobacco Use   • Smoking status: Former     Packs/day: 1.50     Years: 15.00     Pack years: 22.50     Types: Cigarettes     Start date: 1965     Quit date: 3/23/1986     Years since quittin.6   • Smokeless tobacco: Current     Types: Chew   Vaping Use  "  • Vaping Use: Never used   Substance and Sexual Activity   • Alcohol use: Yes     Alcohol/week: 1.0 standard drink     Types: 1 Cans of beer per week   • Drug use: No   • Sexual activity: Yes     Partners: Female         FAMHX  Family History   Problem Relation Age of Onset   • COPD Mother    • Other Father         Brain tumor    • Diabetes Maternal Grandmother    • Hypertension Paternal Grandfather    • Other Paternal Grandfather         acute myocardial infarction    • Colon cancer Neg Hx    • Prostate cancer Neg Hx    • Thyroid disease Neg Hx           ALLERGY  No Known Allergies     MEDSCURRENT    Current Outpatient Medications:   •  aspirin 81 MG EC tablet, Take 81 mg by mouth Daily., Disp: , Rfl:   •  coenzyme Q10 100 MG capsule, Take 100 mg by mouth Daily., Disp: , Rfl:   •  glipizide (GLUCOTROL XL) 10 MG 24 hr tablet, Take 1 tablet by mouth Daily., Disp: 90 tablet, Rfl: 3  •  hydroCHLOROthiazide (HYDRODIURIL) 25 MG tablet, Take 1 tablet by mouth Daily., Disp: 90 tablet, Rfl: 3  •  ranolazine (Ranexa) 500 MG 12 hr tablet, Take 1 tablet by mouth 2 (Two) Times a Day., Disp: 180 tablet, Rfl: 3  •  rosuvastatin (CRESTOR) 20 MG tablet, Take 1 tablet by mouth Daily., Disp: 90 tablet, Rfl: 3  •  telmisartan (MICARDIS) 80 MG tablet, Take 1 tablet by mouth Daily., Disp: 90 tablet, Rfl: 3      Review of Systems   Cardiovascular: Negative for chest pain and dyspnea on exertion.   Respiratory: Positive for shortness of breath.         Objective     /80   Pulse 69   Ht 188 cm (74\")   Wt 124 kg (274 lb)   BMI 35.18 kg/m²       General Appearance:   · well developed  · well nourished  HENT:   · oropharynx moist  · lips not cyanotic  Neck:  · thyroid not enlarged  · supple  Respiratory:  · no respiratory distress  · normal breath sounds  · no rales  Cardiovascular:  · no jugular venous distention  · regular rhythm  · apical impulse normal  · S1 normal, S2 normal  · no S3, no S4   · no murmur  · no rub, no " thrill  · carotid pulses normal; no bruit  · pedal pulses normal  · lower extremity edema: trace    Musculoskeletal:  · no clubbing of fingers.   · normocephalic, head atraumatic  Skin:   · warm, dry  Psychiatric:  · judgement and insight appropriate  · normal mood and affect      Result Review :       Primary care records reviewed, laboratory studies reviewed           Procedures                 Assessment and Plan        ASSESSMENT:  Encounter Diagnoses   Name Primary?   • Coronary artery disease involving native coronary artery of native heart without angina pectoris Yes   • Hypertension, essential    • Dyspnea on exertion          PLAN:    1.  Dyspnea on exertion, improved with controlled blood pressure and antianginal therapy.  2.  Hypertension, much improved, continue efforts to lose weight and reduce dietary sodium  3.  Coronary artery disease, managed medically, continue antianginal therapy, aspirin, statin    Follow-up in 6 months          Patient was given instructions and counseling regarding his condition or for health maintenance advice. Please see specific information pulled into the AVS if appropriate.             ROBINSON Gill MD  10/25/2022    12:06 EDT

## 2022-11-10 DIAGNOSIS — I10 BENIGN ESSENTIAL HYPERTENSION: ICD-10-CM

## 2022-11-10 DIAGNOSIS — E11.9 TYPE 2 DIABETES MELLITUS WITHOUT COMPLICATION, WITHOUT LONG-TERM CURRENT USE OF INSULIN: ICD-10-CM

## 2022-11-10 RX ORDER — GLIPIZIDE 10 MG/1
TABLET, FILM COATED, EXTENDED RELEASE ORAL
Qty: 90 TABLET | Refills: 0 | OUTPATIENT
Start: 2022-11-10

## 2022-11-10 RX ORDER — TELMISARTAN 80 MG/1
TABLET ORAL
Qty: 90 TABLET | Refills: 1 | Status: SHIPPED | OUTPATIENT
Start: 2022-11-10 | End: 2022-12-16

## 2022-11-22 ENCOUNTER — TELEPHONE (OUTPATIENT)
Dept: FAMILY MEDICINE CLINIC | Age: 77
End: 2022-11-22

## 2022-11-30 ENCOUNTER — LAB (OUTPATIENT)
Dept: LAB | Facility: HOSPITAL | Age: 77
End: 2022-11-30

## 2022-11-30 ENCOUNTER — PATIENT MESSAGE (OUTPATIENT)
Dept: FAMILY MEDICINE CLINIC | Age: 77
End: 2022-11-30

## 2022-11-30 DIAGNOSIS — E11.9 TYPE 2 DIABETES MELLITUS TREATED WITHOUT INSULIN: ICD-10-CM

## 2022-11-30 PROCEDURE — 36415 COLL VENOUS BLD VENIPUNCTURE: CPT

## 2022-11-30 PROCEDURE — 80053 COMPREHEN METABOLIC PANEL: CPT

## 2022-11-30 PROCEDURE — 83036 HEMOGLOBIN GLYCOSYLATED A1C: CPT

## 2022-12-01 LAB
ALBUMIN SERPL-MCNC: 4 G/DL (ref 3.5–5.2)
ALBUMIN/GLOB SERPL: 1.3 G/DL
ALP SERPL-CCNC: 64 U/L (ref 39–117)
ALT SERPL W P-5'-P-CCNC: 18 U/L (ref 1–41)
ANION GAP SERPL CALCULATED.3IONS-SCNC: 11.1 MMOL/L (ref 5–15)
AST SERPL-CCNC: 16 U/L (ref 1–40)
BILIRUB SERPL-MCNC: 0.5 MG/DL (ref 0–1.2)
BUN SERPL-MCNC: 20 MG/DL (ref 8–23)
BUN/CREAT SERPL: 16.8 (ref 7–25)
CALCIUM SPEC-SCNC: 9.5 MG/DL (ref 8.6–10.5)
CHLORIDE SERPL-SCNC: 97 MMOL/L (ref 98–107)
CO2 SERPL-SCNC: 28.9 MMOL/L (ref 22–29)
CREAT SERPL-MCNC: 1.19 MG/DL (ref 0.76–1.27)
EGFRCR SERPLBLD CKD-EPI 2021: 63.3 ML/MIN/1.73
GLOBULIN UR ELPH-MCNC: 3.1 GM/DL
GLUCOSE SERPL-MCNC: 145 MG/DL (ref 65–99)
HBA1C MFR BLD: 6.6 % (ref 4.8–5.6)
POTASSIUM SERPL-SCNC: 4.2 MMOL/L (ref 3.5–5.2)
PROT SERPL-MCNC: 7.1 G/DL (ref 6–8.5)
SODIUM SERPL-SCNC: 137 MMOL/L (ref 136–145)

## 2022-12-16 ENCOUNTER — OFFICE VISIT (OUTPATIENT)
Dept: FAMILY MEDICINE CLINIC | Age: 77
End: 2022-12-16

## 2022-12-16 VITALS
SYSTOLIC BLOOD PRESSURE: 102 MMHG | OXYGEN SATURATION: 96 % | WEIGHT: 268.8 LBS | DIASTOLIC BLOOD PRESSURE: 60 MMHG | HEART RATE: 84 BPM | BODY MASS INDEX: 34.5 KG/M2 | TEMPERATURE: 98.5 F | HEIGHT: 74 IN

## 2022-12-16 DIAGNOSIS — Z20.828 EXPOSURE TO THE FLU: ICD-10-CM

## 2022-12-16 DIAGNOSIS — R05.9 COUGH, UNSPECIFIED TYPE: Primary | ICD-10-CM

## 2022-12-16 LAB
EXPIRATION DATE: NORMAL
FLUAV AG UPPER RESP QL IA.RAPID: NOT DETECTED
FLUBV AG UPPER RESP QL IA.RAPID: NOT DETECTED
INTERNAL CONTROL: NORMAL
Lab: NORMAL
SARS-COV-2 AG UPPER RESP QL IA.RAPID: NOT DETECTED

## 2022-12-16 PROCEDURE — 99213 OFFICE O/P EST LOW 20 MIN: CPT | Performed by: NURSE PRACTITIONER

## 2022-12-16 PROCEDURE — 87428 SARSCOV & INF VIR A&B AG IA: CPT | Performed by: NURSE PRACTITIONER

## 2022-12-16 RX ORDER — TIOTROPIUM BROMIDE 18 UG/1
CAPSULE ORAL; RESPIRATORY (INHALATION)
COMMUNITY
Start: 2022-12-08

## 2022-12-16 RX ORDER — OSELTAMIVIR PHOSPHATE 75 MG/1
75 CAPSULE ORAL 2 TIMES DAILY
Qty: 10 CAPSULE | Refills: 0 | Status: SHIPPED | OUTPATIENT
Start: 2022-12-16 | End: 2022-12-21

## 2022-12-16 RX ORDER — DEXTROMETHORPHAN HYDROBROMIDE AND PROMETHAZINE HYDROCHLORIDE 15; 6.25 MG/5ML; MG/5ML
5 SYRUP ORAL NIGHTLY PRN
Qty: 120 ML | Refills: 0 | Status: SHIPPED | OUTPATIENT
Start: 2022-12-16 | End: 2023-01-23

## 2022-12-16 RX ORDER — CEPHALEXIN 250 MG/1
250 CAPSULE ORAL
COMMUNITY
Start: 2022-12-07 | End: 2022-12-17

## 2022-12-16 RX ORDER — ALBUTEROL SULFATE 90 UG/1
2 AEROSOL, METERED RESPIRATORY (INHALATION) EVERY 6 HOURS PRN
COMMUNITY
Start: 2022-12-08

## 2022-12-16 NOTE — PROGRESS NOTES
"Chief Complaint  Cough (Sx started last night ), Nasal Congestion, and Headache Wife has the flu    Subjective        Kennedy Lin presents to [facility]  Cough  This is a new problem. The current episode started yesterday. The problem has been unchanged. The cough is non-productive. Associated symptoms include headaches, nasal congestion and postnasal drip. Pertinent negatives include no chills, fever, myalgias or shortness of breath. He has tried ipratropium inhaler for the symptoms. The treatment provided no relief.       Objective   Vital Signs:  /60 (BP Location: Right arm, Patient Position: Sitting, Cuff Size: Adult)   Pulse 84   Temp 98.5 °F (36.9 °C) (Oral)   Ht 188 cm (74\")   Wt 122 kg (268 lb 12.8 oz)   SpO2 96% Comment: room air  BMI 34.51 kg/m²   Estimated body mass index is 34.51 kg/m² as calculated from the following:    Height as of this encounter: 188 cm (74\").    Weight as of this encounter: 122 kg (268 lb 12.8 oz).          Physical Exam  HENT:      Head: Normocephalic.      Nose: Rhinorrhea present. Rhinorrhea is clear.   Cardiovascular:      Rate and Rhythm: Normal rate and regular rhythm.   Pulmonary:      Effort: Pulmonary effort is normal. No respiratory distress.      Breath sounds: Normal breath sounds. No stridor. No wheezing, rhonchi or rales.   Skin:     General: Skin is warm and dry.   Neurological:      Mental Status: He is alert and oriented to person, place, and time.   Psychiatric:         Mood and Affect: Mood normal.        Result Review :                 Results for orders placed or performed in visit on 12/16/22   POCT SARS-CoV-2 Antigen YAMILKA + Flu    Specimen: Swab   Result Value Ref Range    SARS Antigen Not Detected Not Detected, Presumptive Negative    Influenza A Antigen YAMILKA Not Detected Not Detected    Influenza B Antigen YAMILKA Not Detected Not Detected    Internal Control Passed Passed    Lot Number [id number]     Expiration " Date 11/02/2023        Assessment and Plan   Diagnoses and all orders for this visit:    1. Cough, unspecified type (Primary)  -     POCT SARS-CoV-2 Antigen YAMILKA + Flu  -     promethazine-dextromethorphan (PROMETHAZINE-DM) 6.25-15 MG/5ML syrup; Take 5mL by mouth At Night As Needed for Cough.  Dispense: 120 mL; Refill: 0  -     oseltamivir (Tamiflu) 75 MG capsule; Take 1 capsule by mouth 2 (Two) Times a Day for 5 days.  Dispense: 10 capsule; Refill: 0    2. Exposure to the flu  -     oseltamivir (Tamiflu) 75 MG capsule; Take 1 capsule by mouth 2 (Two) Times a Day for 5 days.  Dispense: 10 capsule; Refill: 0             Follow Up   Return if symptoms worsen or fail to improve.  Patient was given instructions and counseling regarding his condition or for health maintenance advice. Please see specific information pulled into the AVS if appropriate.

## 2023-01-23 ENCOUNTER — OFFICE VISIT (OUTPATIENT)
Dept: FAMILY MEDICINE CLINIC | Age: 78
End: 2023-01-23
Payer: MEDICARE

## 2023-01-23 VITALS
BODY MASS INDEX: 34.78 KG/M2 | DIASTOLIC BLOOD PRESSURE: 65 MMHG | HEIGHT: 74 IN | SYSTOLIC BLOOD PRESSURE: 120 MMHG | HEART RATE: 79 BPM | TEMPERATURE: 97.1 F | WEIGHT: 271 LBS

## 2023-01-23 DIAGNOSIS — Z48.02 ENCOUNTER FOR REMOVAL OF SUTURES: Primary | ICD-10-CM

## 2023-01-23 PROCEDURE — 99213 OFFICE O/P EST LOW 20 MIN: CPT | Performed by: NURSE PRACTITIONER

## 2023-01-23 RX ORDER — TELMISARTAN 80 MG/1
80 TABLET ORAL DAILY
COMMUNITY
Start: 2022-12-30

## 2023-01-23 NOTE — PROGRESS NOTES
Chief Complaint  Kennedy Lin presents to [facility] for Suture / Staple Removal (Removal of stitches )    Subjective          History of Present Illness    Dale is here today for suture removal. Was seen at [facility] ER on 1/13/23 after falling backwards in chair at home. No LOC. 16 sutures were placed. Was advised to have removed in 7 to 10 days. He does not report any concerning neurological symptoms today.     Review of Systems      Allergies   Allergen Reactions   • Grass Unknown - Low Severity      Past Medical History:   Diagnosis Date   • Allergic rhinitis    • Asthma    • Broken bones    • Coccygeal fracture (HCC)    • Coronary artery disease    • Diabetes mellitus, type 2 (HCC)    • Elevated hemoglobin A1c    • Encounter for Medicare annual wellness exam    • History of kidney stones    • Hyperlipidemia    • Hypertension    • Lumbar vertebral fracture (HCC)    • Malignant melanoma of skin (HCC)    • Obesity, Class I, BMI 30-34.9    • Post-nasal drainage    • Rash    • Recurrent nephrolithiasis    • Right rotator cuff tear    • Tobacco chew use    • Viral infection    • Vitamin D deficiency      Current Outpatient Medications   Medication Sig Dispense Refill   • albuterol sulfate  (90 Base) MCG/ACT inhaler Inhale 2 puffs Every 6 (Six) Hours As Needed.     • aspirin 81 MG EC tablet Take 81 mg by mouth Daily.     • coenzyme Q10 100 MG capsule Take 100 mg by mouth Daily.     • glipizide (GLUCOTROL XL) 10 MG 24 hr tablet Take 1 tablet by mouth Daily. 90 tablet 3   • hydroCHLOROthiazide (HYDRODIURIL) 25 MG tablet Take 1 tablet by mouth Daily. 90 tablet 3   • ranolazine (Ranexa) 500 MG 12 hr tablet Take 1 tablet by mouth 2 (Two) Times a Day. 180 tablet 3   • rosuvastatin (CRESTOR) 20 MG tablet Take 1 tablet by mouth Daily. 90 tablet 3   • Spiriva HandiHaler 18 MCG per inhalation capsule inhale THE contents of 1 capsule BY MOUTH EVERY DAY     • telmisartan (MICARDIS) 80  "MG tablet Take 80 mg by mouth Daily.       No current facility-administered medications for this visit.     Past Surgical History:   Procedure Laterality Date   • CARDIAC CATHETERIZATION N/A 2022    Procedure: Left Heart Cath-Dr. Gill wants to do this to follow his device case;  Surgeon: ROBINSON Gill MD;  Location: [facility] CATH INVASIVE LOCATION;  Service: Cardiology;  Laterality: N/A;   • INGUINAL HERNIA REPAIR     • ROTATOR CUFF REPAIR     • SHOULDER SURGERY        Social History     Tobacco Use   • Smoking status: Former     Packs/day: 1.50     Years: 15.00     Pack years: 22.50     Types: Cigarettes     Start date: 1965     Quit date: 3/23/1986     Years since quittin.8   • Smokeless tobacco: Current     Types: Chew   Vaping Use   • Vaping Use: Never used   Substance Use Topics   • Alcohol use: Yes     Alcohol/week: 1.0 standard drink     Types: 1 Cans of beer per week   • Drug use: No     Family History   Problem Relation Age of Onset   • COPD Mother    • Other Father         Brain tumor    • Diabetes Maternal Grandmother    • Hypertension Paternal Grandfather    • Other Paternal Grandfather         acute myocardial infarction    • Colon cancer Neg Hx    • Prostate cancer Neg Hx    • Thyroid disease Neg Hx      Health Maintenance Due   Topic Date Due   • COLORECTAL CANCER SCREENING  Never done   • ZOSTER VACCINE (1 of 2) Never done   • DIABETIC EYE EXAM  Never done   • INFLUENZA VACCINE  2022   • URINE MICROALBUMIN  2022      Immunization History   Administered Date(s) Administered   • Fluad Quad 65+ 10/29/2020   • Fluzone High Dose =>65 Years (Vaxcare ONLY) 10/03/2018, 2019   • Hepatitis A 2018        Objective     Vitals:    23 1134   BP: 120/65   BP Location: Left arm   Patient Position: Sitting   Pulse: 79   Temp: 97.1 °F (36.2 °C)   TempSrc: Oral   Weight: 123 kg (271 lb)   Height: 188 cm (74\")     Body mass index is 34.79 kg/m².     Physical Exam  Vitals " reviewed.   Constitutional:       General: He is not in acute distress.     Appearance: Normal appearance. He is well-developed.   HENT:      Head: Normocephalic and atraumatic.   Cardiovascular:      Rate and Rhythm: Normal rate.   Pulmonary:      Effort: Pulmonary effort is normal.   Skin:     Comments: Incision to back of left side of head with 16 sutures intact, clean, healing   Neurological:      Mental Status: He is alert and oriented to person, place, and time.   Psychiatric:         Mood and Affect: Mood and affect normal.           Result Review :                      Suture Removal    Date/Time: 1/23/2023 12:27 PM  Performed by: Norma Conti APRN  Authorized by: Norma Conti APRN   Body area: head/neck  Location details: scalp  Wound Appearance: clean  Sutures Removed: 16  Post-removal: antibiotic ointment applied  Patient tolerance: patient tolerated the procedure well with no immediate complications              Assessment and Plan      Diagnoses and all orders for this visit:    1. Encounter for removal of sutures (Primary)    Other orders  -     Suture Removal      Sutures removed. Skin care reviewed.         Follow Up     Return for As needed for persistent or worsening symptoms.

## 2023-02-11 DIAGNOSIS — E11.9 TYPE 2 DIABETES MELLITUS WITHOUT COMPLICATION, WITHOUT LONG-TERM CURRENT USE OF INSULIN: ICD-10-CM

## 2023-02-13 RX ORDER — GLIPIZIDE 10 MG/1
TABLET, FILM COATED, EXTENDED RELEASE ORAL
Qty: 90 TABLET | Refills: 0 | Status: SHIPPED | OUTPATIENT
Start: 2023-02-13

## 2023-03-10 ENCOUNTER — OFFICE VISIT (OUTPATIENT)
Dept: FAMILY MEDICINE CLINIC | Age: 78
End: 2023-03-10
Payer: MEDICARE

## 2023-03-10 VITALS
OXYGEN SATURATION: 95 % | HEIGHT: 74 IN | DIASTOLIC BLOOD PRESSURE: 67 MMHG | WEIGHT: 275 LBS | BODY MASS INDEX: 35.29 KG/M2 | HEART RATE: 83 BPM | TEMPERATURE: 98.7 F | SYSTOLIC BLOOD PRESSURE: 109 MMHG

## 2023-03-10 DIAGNOSIS — J43.8 OTHER EMPHYSEMA: Primary | ICD-10-CM

## 2023-03-10 DIAGNOSIS — I10 ESSENTIAL HYPERTENSION: ICD-10-CM

## 2023-03-10 DIAGNOSIS — N30.00 ACUTE CYSTITIS WITHOUT HEMATURIA: ICD-10-CM

## 2023-03-10 DIAGNOSIS — R35.0 FREQUENCY OF MICTURITION: ICD-10-CM

## 2023-03-10 DIAGNOSIS — E78.00 HIGH CHOLESTEROL: ICD-10-CM

## 2023-03-10 DIAGNOSIS — E11.9 TYPE 2 DIABETES MELLITUS TREATED WITHOUT INSULIN: ICD-10-CM

## 2023-03-10 DIAGNOSIS — Z87.891 HISTORY OF NICOTINE DEPENDENCE: ICD-10-CM

## 2023-03-10 LAB
BACTERIA UR QL AUTO: ABNORMAL /HPF
BILIRUB UR QL STRIP: ABNORMAL
CLARITY UR: ABNORMAL
COLOR UR: ABNORMAL
GLUCOSE UR STRIP-MCNC: NEGATIVE MG/DL
HGB UR QL STRIP.AUTO: ABNORMAL
KETONES UR QL STRIP: NEGATIVE
LEUKOCYTE ESTERASE UR QL STRIP.AUTO: ABNORMAL
NITRITE UR QL STRIP: POSITIVE
PH UR STRIP.AUTO: 5.5 [PH] (ref 5–8)
PROT UR QL STRIP: ABNORMAL
RBC # UR STRIP: ABNORMAL /HPF
REF LAB TEST METHOD: ABNORMAL
SP GR UR STRIP: 1.02 (ref 1–1.03)
SQUAMOUS #/AREA URNS HPF: ABNORMAL /HPF
UROBILINOGEN UR QL STRIP: ABNORMAL
WBC # UR STRIP: ABNORMAL /HPF

## 2023-03-10 PROCEDURE — 87186 SC STD MICRODIL/AGAR DIL: CPT | Performed by: FAMILY MEDICINE

## 2023-03-10 PROCEDURE — 1160F RVW MEDS BY RX/DR IN RCRD: CPT | Performed by: FAMILY MEDICINE

## 2023-03-10 PROCEDURE — 3078F DIAST BP <80 MM HG: CPT | Performed by: FAMILY MEDICINE

## 2023-03-10 PROCEDURE — 81001 URINALYSIS AUTO W/SCOPE: CPT | Performed by: FAMILY MEDICINE

## 2023-03-10 PROCEDURE — 87088 URINE BACTERIA CULTURE: CPT | Performed by: FAMILY MEDICINE

## 2023-03-10 PROCEDURE — 87086 URINE CULTURE/COLONY COUNT: CPT | Performed by: FAMILY MEDICINE

## 2023-03-10 PROCEDURE — 99214 OFFICE O/P EST MOD 30 MIN: CPT | Performed by: FAMILY MEDICINE

## 2023-03-10 PROCEDURE — 1159F MED LIST DOCD IN RCRD: CPT | Performed by: FAMILY MEDICINE

## 2023-03-10 PROCEDURE — 3074F SYST BP LT 130 MM HG: CPT | Performed by: FAMILY MEDICINE

## 2023-03-10 RX ORDER — SULFAMETHOXAZOLE AND TRIMETHOPRIM 800; 160 MG/1; MG/1
1 TABLET ORAL 2 TIMES DAILY
Qty: 30 TABLET | Refills: 1 | Status: SHIPPED | OUTPATIENT
Start: 2023-03-10

## 2023-03-10 NOTE — PROGRESS NOTES
Chief Complaint  Shortness of Breath (worsening) and Urinary Frequency    Subjective          Kennedy Lin presents to [facility]  History of Present Illness  --TOLERATING BLOOD PRESSURE MEDICATION WITHOUT APPARENT SIDE EFFECTS  --LAST LIPIDS WERE OK, NO ISSUES WITH MEDS  --LAST HGA1C WAS 6.6 % ON CURRENT MEDS  --SOA WITH EXERTION CONTINUES, NEGATIVE CARDIOLOGY EVAL, HAS NOT SEE PULMONARY  --HE HAS AGAIN DEVELOPED DYSURIA, FREQUENCY AND URGENCY.  HAD A SHORT COURSE OF ANTIBIOTICS A FEW WEEKS AGO        Allergies   Allergen Reactions   • Grass Unknown - Low Severity        Health Maintenance Due   Topic Date Due   • COLORECTAL CANCER SCREENING  Never done   • COVID-19 Vaccine (1) Never done   • ZOSTER VACCINE (1 of 2) Never done   • DIABETIC EYE EXAM  Never done   • INFLUENZA VACCINE  08/01/2022   • URINE MICROALBUMIN  11/11/2022        Current Outpatient Medications on File Prior to Visit   Medication Sig   • albuterol sulfate  (90 Base) MCG/ACT inhaler Inhale 2 puffs Every 6 (Six) Hours As Needed.   • aspirin 81 MG EC tablet Take 1 tablet by mouth Daily.   • coenzyme Q10 100 MG capsule Take 1 capsule by mouth Daily.   • glipizide (GLUCOTROL XL) 10 MG 24 hr tablet Take 1 tablet BY MOUTH ONCE DAILY   • hydroCHLOROthiazide (HYDRODIURIL) 25 MG tablet Take 1 tablet by mouth Daily.   • ranolazine (Ranexa) 500 MG 12 hr tablet Take 1 tablet by mouth 2 (Two) Times a Day.   • rosuvastatin (CRESTOR) 20 MG tablet Take 1 tablet by mouth Daily.   • Spiriva HandiHaler 18 MCG per inhalation capsule inhale THE contents of 1 capsule BY MOUTH EVERY DAY   • telmisartan (MICARDIS) 80 MG tablet Take 1 tablet by mouth Daily.     No current facility-administered medications on file prior to visit.       Immunization History   Administered Date(s) Administered   • Fluad Quad 65+ 10/29/2020   • Fluzone High Dose =>65 Years (Vaxcare ONLY) 10/03/2018, 09/03/2019   • Hepatitis A 12/03/2018  "      Review of Systems   Constitutional: Negative for activity change, appetite change, chills, fatigue and fever.   HENT: Negative for congestion, ear pain, rhinorrhea and sore throat.    Respiratory: Negative for cough and shortness of breath.    Cardiovascular: Negative for chest pain, palpitations and leg swelling.   Gastrointestinal: Negative for abdominal pain, constipation, diarrhea, nausea and vomiting.   Musculoskeletal: Negative for arthralgias and myalgias.   Neurological: Negative for headache.        Objective     /67 (BP Location: Left arm, Patient Position: Sitting)   Pulse 83   Temp 98.7 °F (37.1 °C) (Oral)   Ht 188 cm (74\")   Wt 125 kg (275 lb)   SpO2 95% Comment: on room air  BMI 35.31 kg/m²       Physical Exam  Vitals and nursing note reviewed.   Constitutional:       General: He is not in acute distress.     Appearance: Normal appearance.   Cardiovascular:      Rate and Rhythm: Normal rate and regular rhythm.      Heart sounds: Normal heart sounds. No murmur heard.  Pulmonary:      Effort: Pulmonary effort is normal.      Breath sounds: Normal breath sounds.   Abdominal:      Palpations: Abdomen is soft.      Tenderness: There is no abdominal tenderness. There is no right CVA tenderness or left CVA tenderness.   Musculoskeletal:      Cervical back: Neck supple.      Right lower leg: No edema.      Left lower leg: No edema.   Lymphadenopathy:      Cervical: No cervical adenopathy.   Neurological:      General: No focal deficit present.      Mental Status: He is alert.      Cranial Nerves: No cranial nerve deficit.      Coordination: Coordination normal.      Gait: Gait normal.   Psychiatric:         Mood and Affect: Mood normal.         Behavior: Behavior normal.         Result Review :                             Assessment and Plan      Diagnoses and all orders for this visit:    1. Other emphysema (HCC) (Primary)  Assessment & Plan:  COPD is worsening.  Discussed monitoring " symptoms and use of quick-relief medications and contacting us early in the course of exacerbations.  Counseled to avoid exposure to cigarette smoke.  Continue current medications.   WILL OBTAIN A CT AND SEND TO PULMONARY AS HIS CARDIAC EVAL WAS OK.         Orders:  -     Ambulatory Referral to Pulmonology    2. Frequency of micturition  -     Urinalysis With Culture If Indicated - Urine, Clean Catch  -     Urinalysis, Microscopic Only - Urine, Clean Catch  -     Urine Culture - Urine, Urine, Clean Catch    3. Acute cystitis without hematuria  Comments:  POSSIBLE OCCULT PROSTATITIS, WILL GIVE A LENGTHY COURSE OF ANTIBIOTICS AND CONSIDER UROLOGY EVAL   Orders:  -     sulfamethoxazole-trimethoprim (Bactrim DS) 800-160 MG per tablet; Take 1 tablet by mouth 2 (Two) Times a Day.  Dispense: 30 tablet; Refill: 1    4. History of nicotine dependence  -     CT Chest Low Dose Wo; Future    5. Essential hypertension  Assessment & Plan:  Hypertension is improving with treatment.  Continue current treatment regimen.  Continue current medications.  Blood pressure will be reassessed at the next regular appointment.      6. High cholesterol  Assessment & Plan:  Lipid abnormalities are improving with treatment.  Nutritional counseling was provided.  Lipids will be reassessed in 3 months.      7. Type 2 diabetes mellitus treated without insulin (HCC)  Assessment & Plan:  Diabetes is improving with treatment.   Continue current treatment regimen.  Diabetes will be reassessed in 3 months.            Follow Up     Return in about 3 months (around 6/10/2023).    Patient was given instructions and counseling regarding his condition or for health maintenance advice. Please see specific information pulled into the AVS if appropriate.

## 2023-03-10 NOTE — ASSESSMENT & PLAN NOTE
COPD is worsening.  Discussed monitoring symptoms and use of quick-relief medications and contacting us early in the course of exacerbations.  Counseled to avoid exposure to cigarette smoke.  Continue current medications.   WILL OBTAIN A CT AND SEND TO PULMONARY AS HIS CARDIAC EVAL WAS OK.

## 2023-03-12 LAB — BACTERIA SPEC AEROBE CULT: ABNORMAL

## 2023-03-29 DIAGNOSIS — N30.00 ACUTE CYSTITIS WITHOUT HEMATURIA: ICD-10-CM

## 2023-03-29 RX ORDER — SULFAMETHOXAZOLE AND TRIMETHOPRIM 800; 160 MG/1; MG/1
TABLET ORAL
Qty: 30 TABLET | Refills: 1 | OUTPATIENT
Start: 2023-03-29

## 2023-03-29 NOTE — TELEPHONE ENCOUNTER
Rx Refill Note  Requested Prescriptions     Pending Prescriptions Disp Refills   • sulfamethoxazole-trimethoprim (BACTRIM DS,SEPTRA DS) 800-160 MG per tablet [Pharmacy Med Name: sulfamethoxazole 800 mg-trimethoprim 160 mg tablet] 30 tablet 1     Sig: TAKE 1 TABLET BY MOUTH TWICE DAILY      Last office visit with prescribing clinician: 3/10/2023     Next office visit with prescribing clinician: 4/28/2023     Supriya Henry LPN  03/29/23, 09:15 EDT     Last filled 3/10/23 #30 1 refill     Picked up 2 days ago and starting the next 15 day   Does not need refill

## 2023-04-07 ENCOUNTER — HOSPITAL ENCOUNTER (OUTPATIENT)
Dept: CT IMAGING | Facility: HOSPITAL | Age: 78
Discharge: HOME | End: 2023-04-07
Admitting: FAMILY MEDICINE
Payer: MEDICARE

## 2023-04-07 DIAGNOSIS — Z87.891 HISTORY OF NICOTINE DEPENDENCE: ICD-10-CM

## 2023-04-07 PROCEDURE — 71271 CT THORAX LUNG CANCER SCR C-: CPT

## 2023-04-11 ENCOUNTER — OFFICE VISIT (OUTPATIENT)
Dept: CARDIOLOGY | Facility: CLINIC | Age: 78
End: 2023-04-11
Payer: MEDICARE

## 2023-04-11 VITALS
DIASTOLIC BLOOD PRESSURE: 70 MMHG | BODY MASS INDEX: 34.91 KG/M2 | SYSTOLIC BLOOD PRESSURE: 125 MMHG | HEIGHT: 74 IN | HEART RATE: 75 BPM | WEIGHT: 272 LBS

## 2023-04-11 DIAGNOSIS — I25.10 CORONARY ARTERY DISEASE INVOLVING NATIVE CORONARY ARTERY OF NATIVE HEART WITHOUT ANGINA PECTORIS: Primary | ICD-10-CM

## 2023-04-11 DIAGNOSIS — E78.2 HYPERLIPEMIA, MIXED: ICD-10-CM

## 2023-04-11 DIAGNOSIS — I10 HYPERTENSION, ESSENTIAL: ICD-10-CM

## 2023-04-11 DIAGNOSIS — R06.09 DYSPNEA ON EXERTION: ICD-10-CM

## 2023-04-11 PROCEDURE — 3074F SYST BP LT 130 MM HG: CPT | Performed by: INTERNAL MEDICINE

## 2023-04-11 PROCEDURE — 99214 OFFICE O/P EST MOD 30 MIN: CPT | Performed by: INTERNAL MEDICINE

## 2023-04-11 PROCEDURE — 3078F DIAST BP <80 MM HG: CPT | Performed by: INTERNAL MEDICINE

## 2023-04-11 PROCEDURE — 1159F MED LIST DOCD IN RCRD: CPT | Performed by: INTERNAL MEDICINE

## 2023-04-11 PROCEDURE — 1160F RVW MEDS BY RX/DR IN RCRD: CPT | Performed by: INTERNAL MEDICINE

## 2023-04-11 NOTE — PROGRESS NOTES
Chief Complaint  Coronary Artery Disease    Subjective            Kennedy Lin presents to [facility]    Mr. Lin is here for follow-up evaluation management of coronary artery disease, dyspnea, hypertension.  He recently had a cardiac catheterization which showed 30% LAD, 80% small circumflex branch, normal RCA which is managed medically.  Additionally he has mixed hyperlipidemia.  Overall he is doing relatively well.  He is still somewhat short of breath.  He is following up with pulmonology in April.  He had a recent low-dose CT which showed mild emphysema and expected coronary calcifications but no nodules or masses.  He denies chest pain.  He is compliant with his medical therapy.    [facility]  Past Medical History:   Diagnosis Date   • Allergic rhinitis    • Asthma    • Broken bones    • Coccygeal fracture    • Coronary artery disease    • Diabetes mellitus, type 2    • Elevated hemoglobin A1c    • Encounter for Medicare annual wellness exam    • History of kidney stones    • Hyperlipidemia    • Hypertension    • Lumbar vertebral fracture    • Malignant melanoma of skin    • Obesity, Class I, BMI 30-34.9    • Post-nasal drainage    • Rash    • Recurrent nephrolithiasis    • Right rotator cuff tear    • Tobacco chew use    • Viral infection    • Vitamin D deficiency          SURGICALHX  Past Surgical History:   Procedure Laterality Date   • CARDIAC CATHETERIZATION N/A 6/1/2022    Procedure: Left Heart Cath-Dr. Gill wants to do this to follow his device case;  Surgeon: ROBINSON Gill MD;  Location: [facility] INVASIVE LOCATION;  Service: Cardiology;  Laterality: N/A;   • INGUINAL HERNIA REPAIR     • ROTATOR CUFF REPAIR     • SHOULDER SURGERY          SOC  Social History     Socioeconomic History   • Marital status:    Tobacco Use   • Smoking status: Former     Packs/day: 1.50     Years: 15.00     Pack years: 22.50     Types: Cigarettes     Start date: 8/23/1965     Quit  date: 3/23/1986     Years since quittin.0   • Smokeless tobacco: Current     Types: Chew   Vaping Use   • Vaping Use: Never used   Substance and Sexual Activity   • Alcohol use: Yes     Alcohol/week: 1.0 standard drink     Types: 1 Cans of beer per week   • Drug use: No   • Sexual activity: Yes     Partners: Female         FAM[facility]X  Family History   Problem Relation Age of Onset   • COPD Mother    • Other Father         Brain tumor    • Diabetes Maternal Grandmother    • Hypertension Paternal Grandfather    • Other Paternal Grandfather         acute myocardial infarction    • Colon cancer Neg Hx    • Prostate cancer Neg Hx    • Thyroid disease Neg Hx           ALLERGY  Allergies   Allergen Reactions   • Grass Unknown - Low Severity        MEDSCURRENT    Current Outpatient Medications:   •  albuterol sulfate  (90 Base) MCG/ACT inhaler, Inhale 2 puffs Every 6 (Six) Hours As Needed., Disp: , Rfl:   •  coenzyme Q10 100 MG capsule, Take 1 capsule by mouth Daily., Disp: , Rfl:   •  glipizide (GLUCOTROL XL) 10 MG 24 hr tablet, Take 1 tablet BY MOUTH ONCE DAILY, Disp: 90 tablet, Rfl: 0  •  hydroCHLOROthiazide (HYDRODIURIL) 25 MG tablet, Take 1 tablet by mouth Daily., Disp: 90 tablet, Rfl: 3  •  ranolazine (Ranexa) 500 MG 12 hr tablet, Take 1 tablet by mouth 2 (Two) Times a Day., Disp: 180 tablet, Rfl: 3  •  rosuvastatin (CRESTOR) 20 MG tablet, Take 1 tablet by mouth Daily., Disp: 90 tablet, Rfl: 3  •  Spiriva HandiHaler 18 MCG per inhalation capsule, inhale THE contents of 1 capsule BY MOUTH EVERY DAY, Disp: , Rfl:   •  telmisartan (MICARDIS) 80 MG tablet, Take 1 tablet by mouth Daily., Disp: , Rfl:   •  aspirin 81 MG EC tablet, Take 1 tablet by mouth Daily. (Patient not taking: Reported on 2023), Disp: , Rfl:   •  sulfamethoxazole-trimethoprim (Bactrim DS) 800-160 MG per tablet, Take 1 tablet by mouth 2 (Two) Times a Day. (Patient not taking: Reported on 2023), Disp: 30 tablet, Rfl: 1      Review of  "Systems   Cardiovascular: Positive for dyspnea on exertion. Negative for chest pain.   Respiratory: Positive for shortness of breath.         Objective     /70   Pulse 75   Ht 188 cm (74\")   Wt 123 kg (272 lb)   BMI 34.92 kg/m²       General Appearance:   · well developed  · well nourished  HENT:   · oropharynx moist  · lips not cyanotic  Neck:  · thyroid not enlarged  · supple  Respiratory:  · no respiratory distress  · normal breath sounds  · no rales  Cardiovascular:  · no jugular venous distention  · regular rhythm  · apical impulse normal  · S1 normal, S2 normal  · no S3, no S4   · no murmur  · no rub, no thrill  · carotid pulses normal; no bruit  · pedal pulses normal  · lower extremity edema: trace    Musculoskeletal:  · no clubbing of fingers.   · normocephalic, head atraumatic  Skin:   · warm, dry  Psychiatric:  · judgement and insight appropriate  · normal mood and affect      Result Review :       Primary care records reviewed, laboratory studies reviewed           Procedures               Assessment and Plan        ASSESSMENT:  Encounter Diagnoses   Name Primary?   • Coronary artery disease involving native coronary artery of native heart without angina pectoris Yes   • Hypertension, essential    • Dyspnea on exertion    • Hyperlipemia, mixed          PLAN:    1.  Dyspnea on exertion-ongoing, follows up with pulmonary in April.  From a cardiac standpoint he is stable.  His shortness of breath is out of proportion to the degree of coronary artery disease and territory involved.  2.  Hypertension, stable, continue current medical therapy  3.  Coronary artery disease, managed medically, continue antianginal therapy, aspirin, statin  4.  Mixed hyperlipidemia-stable, continue statin therapy    Follow-up annually unless problems arise          Patient was given instructions and counseling regarding his condition or for health maintenance advice. Please see specific information pulled into the AVS if " appropriate.             C Cornel Gill MD  4/11/2023    14:53 EDT

## 2023-04-17 ENCOUNTER — OFFICE VISIT (OUTPATIENT)
Dept: PULMONOLOGY | Facility: CLINIC | Age: 78
End: 2023-04-17
Payer: MEDICARE

## 2023-04-17 VITALS
HEART RATE: 67 BPM | HEIGHT: 74 IN | RESPIRATION RATE: 16 BRPM | DIASTOLIC BLOOD PRESSURE: 65 MMHG | SYSTOLIC BLOOD PRESSURE: 119 MMHG | BODY MASS INDEX: 35.45 KG/M2 | TEMPERATURE: 98.2 F | OXYGEN SATURATION: 96 % | WEIGHT: 276.2 LBS

## 2023-04-17 DIAGNOSIS — E66.01 MORBID (SEVERE) OBESITY DUE TO EXCESS CALORIES: ICD-10-CM

## 2023-04-17 DIAGNOSIS — F17.201 TOBACCO ABUSE, IN REMISSION: ICD-10-CM

## 2023-04-17 DIAGNOSIS — J41.8 MIXED SIMPLE AND MUCOPURULENT CHRONIC BRONCHITIS: Primary | ICD-10-CM

## 2023-04-17 DIAGNOSIS — R91.1 NODULE OF LOWER LOBE OF LEFT LUNG: ICD-10-CM

## 2023-04-17 PROCEDURE — 3074F SYST BP LT 130 MM HG: CPT | Performed by: INTERNAL MEDICINE

## 2023-04-17 PROCEDURE — 99203 OFFICE O/P NEW LOW 30 MIN: CPT | Performed by: INTERNAL MEDICINE

## 2023-04-17 PROCEDURE — 3078F DIAST BP <80 MM HG: CPT | Performed by: INTERNAL MEDICINE

## 2023-04-17 RX ORDER — FLUTICASONE FUROATE, UMECLIDINIUM BROMIDE AND VILANTEROL TRIFENATATE 200; 62.5; 25 UG/1; UG/1; UG/1
1 POWDER RESPIRATORY (INHALATION) EVERY MORNING
Qty: 2 EACH | Refills: 0 | COMMUNITY
Start: 2023-04-17 | End: 2023-04-18

## 2023-04-17 NOTE — PROGRESS NOTES
Pulmonary Consultation    No ref. provider found,    Thank you for asking me to see Kennedy Lin for   Chief Complaint   Patient presents with   • Emphysema   • Establish Care   • Shortness of Breath   .      History of Present Illness  Kennedy Lin is a 77 y.o. male with a PMH significant for coronary artery disease COPD and tobacco abuse in remission presents for evaluation patient has been having dyspnea on exertion along with cough with wheeze off and on and decreased effort tolerance patient denies any chest pain fever or hemoptysis he has already quit smoking       Tobacco use history:  Former smoker      Review of Systems: History obtained from chart review and the patient.  Review of Systems   Respiratory: Positive for cough, shortness of breath and wheezing.    All other systems reviewed and are negative.    As described in the HPI. Otherwise, remainder of ROS (14 systems) were negative.    Patient Active Problem List   Diagnosis   • Essential hypertension   • Type 2 diabetes mellitus treated without insulin (HCC)   • Smokeless tobacco use   • Abdominal wall seroma   • Coronary artery disease (moderate per cath but not stented)    • High cholesterol   • Other emphysema         Current Outpatient Medications:   •  albuterol sulfate  (90 Base) MCG/ACT inhaler, Inhale 2 puffs Every 6 (Six) Hours As Needed., Disp: , Rfl:   •  aspirin 81 MG EC tablet, Take 1 tablet by mouth Daily., Disp: , Rfl:   •  coenzyme Q10 100 MG capsule, Take 1 capsule by mouth Daily., Disp: , Rfl:   •  glipizide (GLUCOTROL XL) 10 MG 24 hr tablet, Take 1 tablet BY MOUTH ONCE DAILY, Disp: 90 tablet, Rfl: 0  •  hydroCHLOROthiazide (HYDRODIURIL) 25 MG tablet, Take 1 tablet by mouth Daily., Disp: 90 tablet, Rfl: 3  •  ranolazine (Ranexa) 500 MG 12 hr tablet, Take 1 tablet by mouth 2 (Two) Times a Day., Disp: 180 tablet, Rfl: 3  •  rosuvastatin (CRESTOR) 20 MG tablet, Take 1 tablet by mouth Daily., Disp: 90 tablet, Rfl:  3  •  Spiriva HandiHaler 18 MCG per inhalation capsule, inhale THE contents of 1 capsule BY MOUTH EVERY DAY, Disp: , Rfl:   •  sulfamethoxazole-trimethoprim (Bactrim DS) 800-160 MG per tablet, Take 1 tablet by mouth 2 (Two) Times a Day., Disp: 30 tablet, Rfl: 1  •  telmisartan (MICARDIS) 80 MG tablet, Take 1 tablet by mouth Daily., Disp: , Rfl:   •  Fluticasone-Umeclidin-Vilant (TRELEGY) 100-62.5-25 MCG/ACT inhaler, Inhale 1 puff Daily., Disp: 1 each, Rfl: 11    Allergies   Allergen Reactions   • Grass Unknown - Low Severity       Past Medical History:   Diagnosis Date   • Allergic rhinitis    • Asthma    • Broken bones    • Coccygeal fracture    • Coronary artery disease    • Diabetes mellitus, type 2    • Elevated hemoglobin A1c    • Encounter for Medicare annual wellness exam    • History of kidney stones    • Hyperlipidemia    • Hypertension    • Lumbar vertebral fracture    • Malignant melanoma of skin    • Obesity, Class I, BMI 30-34.9    • Post-nasal drainage    • Rash    • Recurrent nephrolithiasis    • Right rotator cuff tear    • Tobacco chew use    • Viral infection    • Vitamin D deficiency      Past Surgical History:   Procedure Laterality Date   • CARDIAC CATHETERIZATION N/A 2022    Procedure: Left Heart Cath-Dr. Gill wants to do this to follow his device case;  Surgeon: ROBINSON Gill MD;  Location: [facility] INVASIVE LOC[facility];  Service: Cardiology;  Laterality: N/A;   • INGUINAL HERNIA REPAIR     • ROTATOR CUFF REPAIR     • SHOULDER SURGERY       Social History     Socioeconomic History   • Marital status:    Tobacco Use   • Smoking status: Former     Packs/day: 1.50     Years: 15.00     Pack years: 22.50     Types: Cigarettes     Start date: 1965     Quit date: 3/23/1986     Years since quittin.0     Passive exposure: Past   • Smokeless tobacco: Current     Types: Chew   Vaping Use   • Vaping Use: Never used   Substance and Sexual Activity   • Alcohol use: Yes      "Alcohol/week: 1.0 standard drink     Types: 1 Cans of beer per week   • Drug use: No   • Sexual activity: Yes     Partners: Female     Family History   Problem Relation Age of Onset   • COPD Mother    • Other Father         Brain tumor    • Diabetes Maternal Grandmother    • Hypertension Paternal Grandfather    • Other Paternal Grandfather         acute myocardial infarction    • Colon cancer Neg Hx    • Prostate cancer Neg Hx    • Thyroid disease Neg Hx        CT Chest Low Dose Cancer Screening WO    Result Date: 4/9/2023    Low-dose screening CT scan of the chest demonstrating no suspicious lung nodule.  Low-dose screening CT scan of the chest is recommended in a year.      TASHIA JOHNSON MD       Electronically Signed and Approved By: TASHIA JOHNSON MD on 4/09/2023 at 10:15                   Objective     Blood pressure 119/65, pulse 67, temperature 98.2 °F (36.8 °C), temperature source Tympanic, resp. rate 16, height 188 cm (74\"), weight 125 kg (276 lb 3.2 oz), SpO2 96 %.  Physical Exam  Vitals and nursing note reviewed.   Constitutional:       Appearance: He is obese.   HENT:      Head: Normocephalic and atraumatic.      Nose: Nose normal.      Mouth/Throat:      Mouth: Mucous membranes are moist.   Eyes:      Conjunctiva/sclera: Conjunctivae normal.      Pupils: Pupils are equal, round, and reactive to light.   Cardiovascular:      Rate and Rhythm: Normal rate and regular rhythm.      Pulses: Normal pulses.      Heart sounds: Normal heart sounds.   Pulmonary:      Effort: Pulmonary effort is normal.      Breath sounds: Wheezing and rhonchi present.   Abdominal:      General: Abdomen is flat. Bowel sounds are normal.      Palpations: Abdomen is soft.   Musculoskeletal:         General: Normal range of motion.      Cervical back: Normal range of motion and neck supple.   Skin:     General: Skin is warm.      Capillary Refill: Capillary refill takes less than 2 seconds.   Neurological:      General: No focal " deficit present.      Mental Status: He is alert and oriented to person, place, and time.   Psychiatric:         Mood and Affect: Mood normal.         Behavior: Behavior normal.       Immunization History   Administered Date(s) Administered   • Fluad Quad 65+ 10/29/2020   • Fluzone High Dose =>65 Years ([employer] ONLY) 10/03/2018, 09/03/2019   • Hepatitis A 12/03/2018            Assessment & Plan     Diagnoses and all orders for this visit:    1. Mixed simple and mucopurulent chronic bronchitis (Primary)  -     Full Pulmonary Function Test With Bronchodilator & ABG; Future  -     CT Chest Low Dose Follow Up With Contrast; Future    2. Tobacco abuse, in remission  -     Full Pulmonary Function Test With Bronchodilator & ABG; Future  -     CT Chest Low Dose Follow Up With Contrast; Future    3. Morbid (severe) obesity due to excess calories  -     Full Pulmonary Function Test With Bronchodilator & ABG; Future  -     CT Chest Low Dose Follow Up With Contrast; Future    4. Nodule of lower lobe of left lung    Other orders  -     Fluticasone-Umeclidin-Vilant (TRELEGY) 100-62.5-25 MCG/ACT inhaler; Inhale 1 puff Daily.  Dispense: 1 each; Refill: 11         Discussion/ Recommendations:   Patient is advised to reduce weight his BMI is 35.46  Patient has already quit smoking  We will order PFTs and repeat CT scan of the chest in a year's time  X-rays and CT scan was reviewed which showed calcific left lower lobe nodule 1 cm along with centrilobular emphysema  We will place patient on Trelegy and get him some samples and stop Spiriva  Albuterol inhaler 2 puffs every 6 hours as needed  Will order alpha-1 antitrypsin  Discussed vaccination and recommended    Class 2 Severe Obesity (BMI >=35 and <=39.9). Obesity-related health conditions include the following: coronary heart disease. Obesity is unchanged. BMI is is above average; BMI management plan is completed. We discussed low calorie, low carb based diet program, portion  control and increasing exercise.           Return in about 3 months (around 7/17/2023).      Thank you for allowing me to participate in the care of Kennedy ZAMORA Lin. Please do not hesitate to contact me with any questions.         This document has been electronically signed by Moe Ko MD on April 17, 2023 08:49 EDT

## 2023-04-24 ENCOUNTER — PATIENT ROUNDING (BHMG ONLY) (OUTPATIENT)
Dept: PULMONOLOGY | Facility: CLINIC | Age: 78
End: 2023-04-24
Payer: MEDICARE

## 2023-04-24 NOTE — PROGRESS NOTES
April 24, 2023    Hello, may I speak with Kennedy Lin?    My name is Arin    I am  with [facility]  [address]  [address]  [place] [address] [phone].    Before we get started may I verify your date of birth? 1945    I am calling to officially welcome you to our practice and ask about your recent visit. Is this a good time to talk? My chart message sent for patient rounding.

## 2023-04-28 ENCOUNTER — TELEPHONE (OUTPATIENT)
Dept: FAMILY MEDICINE CLINIC | Age: 78
End: 2023-04-28

## 2023-04-28 ENCOUNTER — LAB (OUTPATIENT)
Dept: LAB | Facility: HOSPITAL | Age: 78
End: 2023-04-28
Payer: MEDICARE

## 2023-04-28 ENCOUNTER — OFFICE VISIT (OUTPATIENT)
Dept: FAMILY MEDICINE CLINIC | Age: 78
End: 2023-04-28
Payer: MEDICARE

## 2023-04-28 VITALS
BODY MASS INDEX: 35.75 KG/M2 | OXYGEN SATURATION: 95 % | HEART RATE: 79 BPM | HEIGHT: 74 IN | DIASTOLIC BLOOD PRESSURE: 68 MMHG | SYSTOLIC BLOOD PRESSURE: 114 MMHG | WEIGHT: 278.6 LBS

## 2023-04-28 DIAGNOSIS — J41.8 MIXED SIMPLE AND MUCOPURULENT CHRONIC BRONCHITIS: ICD-10-CM

## 2023-04-28 DIAGNOSIS — E11.9 TYPE 2 DIABETES MELLITUS TREATED WITHOUT INSULIN: ICD-10-CM

## 2023-04-28 DIAGNOSIS — E78.00 HIGH CHOLESTEROL: ICD-10-CM

## 2023-04-28 DIAGNOSIS — Z00.00 MEDICARE ANNUAL WELLNESS VISIT, SUBSEQUENT: Primary | ICD-10-CM

## 2023-04-28 DIAGNOSIS — Z12.11 COLON CANCER SCREENING: ICD-10-CM

## 2023-04-28 DIAGNOSIS — F17.201 TOBACCO ABUSE, IN REMISSION: ICD-10-CM

## 2023-04-28 DIAGNOSIS — J43.8 OTHER EMPHYSEMA: ICD-10-CM

## 2023-04-28 DIAGNOSIS — R35.0 FREQUENCY OF MICTURITION: ICD-10-CM

## 2023-04-28 DIAGNOSIS — Z12.5 SCREENING FOR PROSTATE CANCER: ICD-10-CM

## 2023-04-28 DIAGNOSIS — I10 ESSENTIAL HYPERTENSION: ICD-10-CM

## 2023-04-28 PROBLEM — Z53.20 COLONOSCOPY REFUSED: Status: ACTIVE | Noted: 2023-04-28

## 2023-04-28 PROBLEM — J30.89 OTHER ALLERGIC RHINITIS: Status: ACTIVE | Noted: 2023-04-28

## 2023-04-28 PROBLEM — S30.1XXA ABDOMINAL WALL SEROMA: Status: RESOLVED | Noted: 2021-07-13 | Resolved: 2023-04-28

## 2023-04-28 LAB
BACTERIA UR QL AUTO: ABNORMAL /HPF
BASOPHILS # BLD AUTO: 0.06 10*3/MM3 (ref 0–0.2)
BASOPHILS NFR BLD AUTO: 0.3 % (ref 0–1.5)
BILIRUB UR QL STRIP: NEGATIVE
CLARITY UR: ABNORMAL
COLOR UR: ABNORMAL
DEPRECATED RDW RBC AUTO: 46.3 FL (ref 37–54)
DEPRECATED RDW RBC AUTO: 47.1 FL (ref 37–54)
EOSINOPHIL # BLD AUTO: 0.03 10*3/MM3 (ref 0–0.4)
EOSINOPHIL NFR BLD AUTO: 0.1 % (ref 0.3–6.2)
ERYTHROCYTE [DISTWIDTH] IN BLOOD BY AUTOMATED COUNT: 13.4 % (ref 12.3–15.4)
ERYTHROCYTE [DISTWIDTH] IN BLOOD BY AUTOMATED COUNT: 13.4 % (ref 12.3–15.4)
GLUCOSE UR STRIP-MCNC: NEGATIVE MG/DL
HBA1C MFR BLD: 7.1 % (ref 4.8–5.6)
HCT VFR BLD AUTO: 37.6 % (ref 37.5–51)
HCT VFR BLD AUTO: 37.7 % (ref 37.5–51)
HGB BLD-MCNC: 12.2 G/DL (ref 13–17.7)
HGB BLD-MCNC: 12.3 G/DL (ref 13–17.7)
HGB UR QL STRIP.AUTO: ABNORMAL
IMM GRANULOCYTES # BLD AUTO: 0.07 10*3/MM3 (ref 0–0.05)
IMM GRANULOCYTES NFR BLD AUTO: 0.3 % (ref 0–0.5)
KETONES UR QL STRIP: NEGATIVE
LEUKOCYTE ESTERASE UR QL STRIP.AUTO: ABNORMAL
LYMPHOCYTES # BLD AUTO: 1.55 10*3/MM3 (ref 0.7–3.1)
LYMPHOCYTES NFR BLD AUTO: 7.1 % (ref 19.6–45.3)
MCH RBC QN AUTO: 30.4 PG (ref 26.6–33)
MCH RBC QN AUTO: 30.6 PG (ref 26.6–33)
MCHC RBC AUTO-ENTMCNC: 32.4 G/DL (ref 31.5–35.7)
MCHC RBC AUTO-ENTMCNC: 32.7 G/DL (ref 31.5–35.7)
MCV RBC AUTO: 93.5 FL (ref 79–97)
MCV RBC AUTO: 94 FL (ref 79–97)
MONOCYTES # BLD AUTO: 2.42 10*3/MM3 (ref 0.1–0.9)
MONOCYTES NFR BLD AUTO: 11.1 % (ref 5–12)
MUCOUS THREADS URNS QL MICRO: ABNORMAL /HPF
NEUTROPHILS NFR BLD AUTO: 17.59 10*3/MM3 (ref 1.7–7)
NEUTROPHILS NFR BLD AUTO: 81.1 % (ref 42.7–76)
NITRITE UR QL STRIP: POSITIVE
PH UR STRIP.AUTO: 5.5 [PH] (ref 5–8)
PLATELET # BLD AUTO: 189 10*3/MM3 (ref 140–450)
PLATELET # BLD AUTO: 199 10*3/MM3 (ref 140–450)
PMV BLD AUTO: 10.5 FL (ref 6–12)
PMV BLD AUTO: 10.8 FL (ref 6–12)
PROT UR QL STRIP: ABNORMAL
RBC # BLD AUTO: 4.01 10*6/MM3 (ref 4.14–5.8)
RBC # BLD AUTO: 4.02 10*6/MM3 (ref 4.14–5.8)
RBC # UR STRIP: ABNORMAL /HPF
REF LAB TEST METHOD: ABNORMAL
SP GR UR STRIP: >=1.03 (ref 1–1.03)
SQUAMOUS #/AREA URNS HPF: ABNORMAL /HPF
UROBILINOGEN UR QL STRIP: ABNORMAL
WBC # UR STRIP: ABNORMAL /HPF
WBC NRBC COR # BLD: 21.47 10*3/MM3 (ref 3.4–10.8)
WBC NRBC COR # BLD: 21.72 10*3/MM3 (ref 3.4–10.8)

## 2023-04-28 PROCEDURE — 85025 COMPLETE CBC W/AUTO DIFF WBC: CPT | Performed by: FAMILY MEDICINE

## 2023-04-28 PROCEDURE — 83036 HEMOGLOBIN GLYCOSYLATED A1C: CPT | Performed by: FAMILY MEDICINE

## 2023-04-28 PROCEDURE — 84443 ASSAY THYROID STIM HORMONE: CPT | Performed by: FAMILY MEDICINE

## 2023-04-28 PROCEDURE — 87086 URINE CULTURE/COLONY COUNT: CPT | Performed by: FAMILY MEDICINE

## 2023-04-28 PROCEDURE — 81001 URINALYSIS AUTO W/SCOPE: CPT | Performed by: FAMILY MEDICINE

## 2023-04-28 PROCEDURE — 80053 COMPREHEN METABOLIC PANEL: CPT | Performed by: FAMILY MEDICINE

## 2023-04-28 PROCEDURE — 87088 URINE BACTERIA CULTURE: CPT | Performed by: FAMILY MEDICINE

## 2023-04-28 PROCEDURE — 87186 SC STD MICRODIL/AGAR DIL: CPT | Performed by: FAMILY MEDICINE

## 2023-04-28 PROCEDURE — 36415 COLL VENOUS BLD VENIPUNCTURE: CPT

## 2023-04-28 PROCEDURE — 80061 LIPID PANEL: CPT | Performed by: FAMILY MEDICINE

## 2023-04-28 PROCEDURE — G0103 PSA SCREENING: HCPCS | Performed by: FAMILY MEDICINE

## 2023-04-28 PROCEDURE — 82103 ALPHA-1-ANTITRYPSIN TOTAL: CPT

## 2023-04-28 RX ORDER — SULFAMETHOXAZOLE AND TRIMETHOPRIM 800; 160 MG/1; MG/1
1 TABLET ORAL 2 TIMES DAILY
Qty: 60 TABLET | Refills: 0 | Status: SHIPPED | OUTPATIENT
Start: 2023-04-28

## 2023-04-28 NOTE — ASSESSMENT & PLAN NOTE
ADVICE GIVEN RE:  SEATBELT USE, ALCOHOL USE, HEALTHY DIET, ROUTINE EYE AND DENTAL EXAM, ROUTINE VACCINATIONS.    DECLINES A COLONOSCOPY BUT CONSENTS TO A COLOGUARD  WE WILL CHECK WITH [facility] TO SEE WHAT VACCINATIONS HE HAS HAD THERE

## 2023-04-28 NOTE — PROGRESS NOTES
The ABCs of the Annual Wellness Visit  Subsequent Medicare Wellness Visit    Subjective    Kennedy Lin is a 77 y.o. male who presents for a Subsequent Medicare Wellness Visit.    The following portions of the patient's history were reviewed and   updated as appropriate: allergies, current medications, past family history, past medical history, past social history, past surgical history and problem list.    Compared to one year ago, the patient feels his physical   health is the same.    Compared to one year ago, the patient feels his mental   health is the same.    Recent Hospitalizations:  He was not admitted to the hospital during the last year.       Current Medical Providers:  Patient Care Team:  Jamarcus Doss MD as PCP - General (Family Medicine)    Outpatient Medications Prior to Visit   Medication Sig Dispense Refill   • albuterol sulfate  (90 Base) MCG/ACT inhaler Inhale 2 puffs Every 6 (Six) Hours As Needed.     • aspirin 81 MG EC tablet Take 1 tablet by mouth Daily.     • coenzyme Q10 100 MG capsule Take 1 capsule by mouth Daily.     • Fluticasone-Umeclidin-Vilant (TRELEGY) 100-62.5-25 MCG/ACT inhaler Inhale 1 puff Daily. 1 each 11   • glipizide (GLUCOTROL XL) 10 MG 24 hr tablet Take 1 tablet BY MOUTH ONCE DAILY 90 tablet 0   • hydroCHLOROthiazide (HYDRODIURIL) 25 MG tablet Take 1 tablet by mouth Daily. 90 tablet 3   • ranolazine (Ranexa) 500 MG 12 hr tablet Take 1 tablet by mouth 2 (Two) Times a Day. 180 tablet 3   • rosuvastatin (CRESTOR) 20 MG tablet Take 1 tablet by mouth Daily. 90 tablet 3   • Spiriva HandiHaler 18 MCG per inhalation capsule inhale THE contents of 1 capsule BY MOUTH EVERY DAY     • telmisartan (MICARDIS) 80 MG tablet Take 1 tablet by mouth Daily.     • sulfamethoxazole-trimethoprim (Bactrim DS) 800-160 MG per tablet Take 1 tablet by mouth 2 (Two) Times a Day. 30 tablet 1     No facility-administered medications prior to visit.       No opioid medication  "identified on active medication list. I have reviewed chart for other potential  high risk medication/s and harmful drug interactions in the elderly.          Aspirin is on active medication list. Aspirin use is indicated based on review of current medical condition/s. Pros and cons of this therapy have been discussed today. Benefits of this medication outweigh potential harm.  Patient has been encouraged to continue taking this medication.  .      Patient Active Problem List   Diagnosis   • Essential hypertension   • Type 2 diabetes mellitus treated without insulin ([facility])   • Smokeless tobacco use   • Medicare annual wellness visit, subsequent   • Coronary artery disease (moderate per cath but not stented)    • High cholesterol   • Other emphysema   • Other allergic rhinitis   • Colonoscopy declined (Cologuard ordered in 2023)      Advance Care Planning   Advance Care Planning     Advance Directive is not on file.  ACP discussion was held with the patient during this visit. Patient has an advance directive (not in EMR), copy requested.     Objective    Vitals:    04/28/23 1257   BP: 114/68   BP Location: Left arm   Patient Position: Sitting   Pulse: 79   SpO2: 95%  Comment: on room air   Weight: 126 kg (278 lb 9.6 oz)   Height: 188 cm (74\")   PainSc: 0-No pain     Estimated body mass index is 35.77 kg/m² as calculated from the following:    Height as of this encounter: 188 cm (74\").    Weight as of this encounter: 126 kg (278 lb 9.6 oz).    Class 2 Severe Obesity (BMI >=35 and <=39.9). Obesity-related health conditions include the following: diabetes mellitus. Obesity is unchanged. BMI is is above average; BMI management plan is completed. We discussed portion control and increasing exercise.      Does the patient have evidence of cognitive impairment? No          HEALTH RISK ASSESSMENT    Smoking Status:  Social History     Tobacco Use   Smoking Status Former   • Packs/day: 1.50   • Years: 15.00   • Pack years: " 22.50   • Types: Cigarettes   • Start date: 1965   • Quit date: 3/23/1986   • Years since quittin.1   • Passive exposure: Past   Smokeless Tobacco Current   • Types: Chew     Alcohol Consumption:  Social History     Substance and Sexual Activity   Alcohol Use Yes   • Alcohol/week: 1.0 standard drink   • Types: 1 Cans of beer per week     Fall Risk Screen:    SUKUMAR Fall Risk Assessment was completed, and patient is at HIGH risk for falls. Assessment completed on:2023    Depression Screenin/28/2023    12:55 PM   PHQ-2/PHQ-9 Depression Screening   Little Interest or Pleasure in Doing Things 0-->not at all   Feeling Down, Depressed or Hopeless 0-->not at all   PHQ-9: Brief Depression Severity Measure Score 0       Health Habits and Functional and Cognitive Screenin/28/2023    12:55 PM   Functional & Cognitive Status   Do you have difficulty preparing food and eating? No   Do you have difficulty bathing yourself, getting dressed or grooming yourself? No   Do you have difficulty using the toilet? No   Do you have difficulty moving around from place to place? No   Do you have trouble with steps or getting out of a bed or a chair? No   Current Diet Well Balanced Diet   Dental Exam Up to date   Eye Exam Up to date   Exercise (times per week) 7 times per week   Current Exercises Include Walking;Other   Do you need help using the phone?  No   Are you deaf or do you have serious difficulty hearing?  Yes   Do you need help with transportation? No   Do you need help shopping? No   Do you need help preparing meals?  No   Do you need help with housework?  No   Do you need help with laundry? No   Do you need help taking your medications? No   Do you need help managing money? No   Do you ever drive or ride in a car without wearing a seat belt? No   Have you felt unusual stress, anger or loneliness in the last month? No   Who do you live with? Spouse   If you need help, do you have trouble finding  someone available to you? No   Do you have difficulty concentrating, remembering or making decisions? No       Age-appropriate Screening Schedule:  Refer to the list below for future screening recommendations based on patient's age, sex and/or medical conditions. Orders for these recommended tests are listed in the plan section. The patient has been provided with a written plan.    Health Maintenance   Topic Date Due   • COLORECTAL CANCER SCREENING  Never done   • DIABETIC EYE EXAM  Never done   • URINE MICROALBUMIN  11/11/2022   • ZOSTER VACCINE (1 of 2) 04/28/2023 (Originally 8/23/1996)   • COVID-19 Vaccine (1) 04/30/2023 (Originally 2/23/1946)   • Pneumococcal Vaccine 65+ (1 - PCV) 05/15/2023 (Originally 8/23/1951)   • TDAP/TD VACCINES (1 - Tdap) 08/15/2023 (Originally 8/23/1965)   • HEMOGLOBIN A1C  05/30/2023   • INFLUENZA VACCINE  08/01/2023   • LIPID PANEL  08/15/2023   • ANNUAL WELLNESS VISIT  04/28/2024   • HEPATITIS C SCREENING  Completed                  CMS Preventative Services Quick Reference  Risk Factors Identified During Encounter  None Identified  The above risks/problems have been discussed with the patient.  Pertinent information has been shared with the patient in the After Visit Summary.  An After Visit Summary and PPPS were made available to the patient.    Follow Up:   Next Medicare Wellness visit to be scheduled in 1 year.       Additional E&M Note during same encounter follows:  Patient has multiple medical problems which are significant and separately identifiable that require additional work above and beyond the Medicare Wellness Visit.      Chief Complaint  Medicare Wellness-subsequent and Urinary Frequency    Subjective        --TOLERATING BLOOD PRESSURE MEDICATION WITHOUT APPARENT SIDE EFFECTS  --LAST LIPIDS WERE OK, NO ISSUES WITH MEDS  --LAST HGA1C WAS < 6 %, DUE FOR A RECHECK  --PULMONARY WORKUP IS IN PROGRESS, [facility] IS HELPING  --URINARY FREQUENCY HAS RETURNED, GOT BETTER WITH THE  "KENNEDY Lin is also being seen today for BREATHING, SUGAR, CHOLESTEROL, URINARY FREQUENCY     Review of Systems   Constitutional: Negative for activity change, appetite change, chills, fatigue and fever.   HENT: Negative for congestion, ear pain, hearing loss, rhinorrhea and sore throat.    Eyes: Negative for discharge and visual disturbance.   Respiratory: Negative for cough.    Cardiovascular: Negative for chest pain, palpitations and leg swelling.   Gastrointestinal: Negative for abdominal pain, nausea and vomiting.   Genitourinary: Negative for dysuria and hematuria.   Musculoskeletal: Negative for arthralgias and myalgias.   Psychiatric/Behavioral: Negative for dysphoric mood.       Objective   Vital Signs:  /68 (BP Location: Left arm, Patient Position: Sitting)   Pulse 79   Ht 188 cm (74\")   Wt 126 kg (278 lb 9.6 oz)   SpO2 95% Comment: on room air  BMI 35.77 kg/m²     Physical Exam  Vitals and nursing note reviewed.   Constitutional:       General: He is not in acute distress.     Appearance: Normal appearance.   HENT:      Right Ear: Tympanic membrane normal.      Left Ear: Tympanic membrane normal.      Mouth/Throat:      Pharynx: Oropharynx is clear.   Eyes:      Conjunctiva/sclera: Conjunctivae normal.   Cardiovascular:      Rate and Rhythm: Normal rate and regular rhythm.      Heart sounds: Normal heart sounds. No murmur heard.  Pulmonary:      Effort: Pulmonary effort is normal.      Breath sounds: Normal breath sounds.   Abdominal:      General: Bowel sounds are normal.      Palpations: Abdomen is soft.      Tenderness: There is no abdominal tenderness.   Musculoskeletal:      Cervical back: Neck supple.      Right lower leg: No edema.      Left lower leg: No edema.   Lymphadenopathy:      Cervical: No cervical adenopathy.   Neurological:      General: No focal deficit present.      Mental Status: He is alert.      Cranial Nerves: No cranial nerve deficit.      Coordination: " Coordination normal.      Gait: Gait normal.   Psychiatric:         Mood and Affect: Mood normal.         Behavior: Behavior normal.          The following data was reviewed by: Jamarcus Doss MD on 04/28/2023:  Common labs        6/1/2022    11:13 8/15/2022    10:07 11/30/2022    13:03   Common Labs   Glucose 133   119   145     BUN 14   18   20     Creatinine 1.02   1.32   1.19     Sodium 138   138   137     Potassium 4.0   4.1   4.2     Chloride 103   101   97     Calcium 9.2   9.2   9.5     Albumin  4.10   4.00     Total Bilirubin  0.4   0.5     Alkaline Phosphatase  64   64     AST (SGOT)  13   16     ALT (SGPT)  17   18     WBC 6.79       Hemoglobin 14.2       Hematocrit 43.1       Platelets 155       Total Cholesterol  88      Triglycerides  112      HDL Cholesterol  29      LDL Cholesterol   38      Hemoglobin A1C  6.80   6.60       Data reviewed: Consultant notes PULMONARY NOTES           Assessment and Plan   Diagnoses and all orders for this visit:    1. Medicare annual wellness visit, subsequent (Primary)  Assessment & Plan:  ADVICE GIVEN RE:  SEATBELT USE, ALCOHOL USE, HEALTHY DIET, ROUTINE EYE AND DENTAL EXAM, ROUTINE VACCINATIONS.    DECLINES A COLONOSCOPY BUT CONSENTS TO A COLOGUARD  WE WILL CHECK WITH [facility] TO SEE WHAT VACCINATIONS HE HAS HAD THERE       2. Frequency of micturition  Comments:  RECURRENT, PROBABLY PROSTATITIS, WILL COVER AGAIN AS NOTED BUT ALSO SEND HIM TO UROLOGY   Orders:  -     Urinalysis With Culture If Indicated - Urine, Clean Catch  -     Urinalysis, Microscopic Only - Urine, Clean Catch  -     Urine Culture - Urine, Urine, Clean Catch  -     sulfamethoxazole-trimethoprim (Bactrim DS) 800-160 MG per tablet; Take 1 tablet by mouth 2 (Two) Times a Day.  Dispense: 60 tablet; Refill: 0  -     Ambulatory Referral to Urology    3. Essential hypertension  Assessment & Plan:  Hypertension is improving with treatment.  Continue current treatment regimen.  Continue current  medications.  Blood pressure will be reassessed at the next regular appointment.    Orders:  -     CBC (No Diff)  -     Comprehensive Metabolic Panel  -     TSH Rfx On Abnormal To Free T4    4. High cholesterol  Assessment & Plan:  Lipid abnormalities are improving with treatment.  Nutritional counseling was provided.  Lipids will be reassessed in 6 months.    Orders:  -     Lipid Panel    5. Other emphysema  Assessment & Plan:  COPD is unchanged.  Discussed monitoring symptoms and use of quick-relief medications and contacting us early in the course of exacerbations.   PULMONARY W/U IN PROGRESS, NOTES REVIEWED           6. Type 2 diabetes mellitus treated without insulin (HCC)  Assessment & Plan:  Diabetes is improving with treatment.   Continue current treatment regimen.  Diabetes will be reassessed in 6 months.    Orders:  -     Hemoglobin A1c    7. Screening for prostate cancer  -     PSA Screen    8. Colon cancer screening  -     Cologuard - Stool, Per Rectum; Future           Follow Up   Return in about 6 months (around 10/28/2023).  Patient was given instructions and counseling regarding his condition or for health maintenance advice. Please see specific information pulled into the AVS if appropriate.

## 2023-04-28 NOTE — TELEPHONE ENCOUNTER
Dr Doss put in a order for a cbc with diff.  The first lab order was without diff.   Maribell called from the lab and said she had to do a no charge on the first cbc order and reorder it with a diff.  She has a new critical cbc of 21.72.

## 2023-04-28 NOTE — ASSESSMENT & PLAN NOTE
COPD is unchanged.  Discussed monitoring symptoms and use of quick-relief medications and contacting us early in the course of exacerbations.   PULMONARY W/U IN PROGRESS, NOTES REVIEWED

## 2023-04-29 LAB
ALBUMIN SERPL-MCNC: 3.9 G/DL (ref 3.5–5.2)
ALBUMIN/GLOB SERPL: 1.4 G/DL
ALP SERPL-CCNC: 55 U/L (ref 39–117)
ALPHA1 GLOB MFR UR ELPH: 186 MG/DL (ref 90–200)
ALT SERPL W P-5'-P-CCNC: 10 U/L (ref 1–41)
ANION GAP SERPL CALCULATED.3IONS-SCNC: 9 MMOL/L (ref 5–15)
AST SERPL-CCNC: 12 U/L (ref 1–40)
BILIRUB SERPL-MCNC: 1.1 MG/DL (ref 0–1.2)
BUN SERPL-MCNC: 20 MG/DL (ref 8–23)
BUN/CREAT SERPL: 14.1 (ref 7–25)
CALCIUM SPEC-SCNC: 8.5 MG/DL (ref 8.6–10.5)
CHLORIDE SERPL-SCNC: 98 MMOL/L (ref 98–107)
CHOLEST SERPL-MCNC: 81 MG/DL (ref 0–200)
CO2 SERPL-SCNC: 28 MMOL/L (ref 22–29)
CREAT SERPL-MCNC: 1.42 MG/DL (ref 0.76–1.27)
EGFRCR SERPLBLD CKD-EPI 2021: 51.2 ML/MIN/1.73
GLOBULIN UR ELPH-MCNC: 2.8 GM/DL
GLUCOSE SERPL-MCNC: 136 MG/DL (ref 65–99)
HDLC SERPL-MCNC: 32 MG/DL (ref 40–60)
LDLC SERPL CALC-MCNC: 31 MG/DL (ref 0–100)
LDLC/HDLC SERPL: 0.95 {RATIO}
POTASSIUM SERPL-SCNC: 3.8 MMOL/L (ref 3.5–5.2)
PROT SERPL-MCNC: 6.7 G/DL (ref 6–8.5)
PSA SERPL-MCNC: 6.33 NG/ML (ref 0–4)
SODIUM SERPL-SCNC: 135 MMOL/L (ref 136–145)
TRIGL SERPL-MCNC: 93 MG/DL (ref 0–150)
TSH SERPL DL<=0.05 MIU/L-ACNC: 1.75 UIU/ML (ref 0.27–4.2)
VLDLC SERPL-MCNC: 18 MG/DL (ref 5–40)

## 2023-04-30 LAB — BACTERIA SPEC AEROBE CULT: ABNORMAL

## 2023-05-01 ENCOUNTER — TELEPHONE (OUTPATIENT)
Dept: FAMILY MEDICINE CLINIC | Age: 78
End: 2023-05-01
Payer: MEDICARE

## 2023-05-01 ENCOUNTER — OFFICE VISIT (OUTPATIENT)
Dept: PULMONOLOGY | Facility: CLINIC | Age: 78
End: 2023-05-01
Payer: MEDICARE

## 2023-05-01 ENCOUNTER — HOSPITAL ENCOUNTER (OUTPATIENT)
Dept: RESPIRATORY THERAPY | Facility: HOSPITAL | Age: 78
Discharge: HOME | End: 2023-05-01
Admitting: INTERNAL MEDICINE
Payer: MEDICARE

## 2023-05-01 VITALS
BODY MASS INDEX: 35.58 KG/M2 | RESPIRATION RATE: 18 BRPM | HEART RATE: 83 BPM | OXYGEN SATURATION: 96 % | DIASTOLIC BLOOD PRESSURE: 83 MMHG | TEMPERATURE: 98.2 F | WEIGHT: 277.2 LBS | SYSTOLIC BLOOD PRESSURE: 116 MMHG | HEIGHT: 74 IN

## 2023-05-01 DIAGNOSIS — J43.2 CENTRILOBULAR EMPHYSEMA: ICD-10-CM

## 2023-05-01 DIAGNOSIS — R06.09 DYSPNEA ON EXERTION: Primary | ICD-10-CM

## 2023-05-01 DIAGNOSIS — R97.20 ELEVATED PSA: Primary | ICD-10-CM

## 2023-05-01 DIAGNOSIS — E66.01 MORBID (SEVERE) OBESITY DUE TO EXCESS CALORIES: ICD-10-CM

## 2023-05-01 DIAGNOSIS — J41.8 MIXED SIMPLE AND MUCOPURULENT CHRONIC BRONCHITIS: ICD-10-CM

## 2023-05-01 DIAGNOSIS — N39.0 URINARY TRACT INFECTION WITHOUT HEMATURIA, SITE UNSPECIFIED: ICD-10-CM

## 2023-05-01 DIAGNOSIS — F17.201 TOBACCO ABUSE, IN REMISSION: ICD-10-CM

## 2023-05-01 DIAGNOSIS — D72.829 LEUKOCYTOSIS, UNSPECIFIED TYPE: ICD-10-CM

## 2023-05-01 LAB
ARTERIAL PATENCY WRIST A: POSITIVE
BASE EXCESS BLDA CALC-SCNC: 1.9 MMOL/L (ref -2–2)
BDY SITE: ABNORMAL
COHGB MFR BLD: 0.8 % (ref 0–1.5)
FHHB: 5.1 % (ref 0–5)
HCO3 BLDA-SCNC: 25.5 MMOL/L (ref 22–26)
HGB BLDA-MCNC: 14.2 G/DL (ref 13.8–16.4)
INHALED O2 CONCENTRATION: 21 %
METHGB BLD QL: 0.3 % (ref 0–1.5)
MODALITY: ABNORMAL
OXYHGB MFR BLDV: 93.8 % (ref 94–99)
PCO2 BLDA: 36.5 MM HG (ref 35–45)
PH BLDA: 7.46 PH UNITS (ref 7.35–7.45)
PO2 BLD: 356 MM[HG] (ref 0–500)
PO2 BLDA: 74.7 MM HG (ref 80–100)
SAO2 % BLDCOA: 94.8 % (ref 95–99)

## 2023-05-01 PROCEDURE — 94060 EVALUATION OF WHEEZING: CPT

## 2023-05-01 PROCEDURE — 94726 PLETHYSMOGRAPHY LUNG VOLUMES: CPT

## 2023-05-01 PROCEDURE — 36600 WITHDRAWAL OF ARTERIAL BLOOD: CPT | Performed by: INTERNAL MEDICINE

## 2023-05-01 PROCEDURE — 82805 BLOOD GASES W/O2 SATURATION: CPT | Performed by: INTERNAL MEDICINE

## 2023-05-01 PROCEDURE — 82375 ASSAY CARBOXYHB QUANT: CPT | Performed by: INTERNAL MEDICINE

## 2023-05-01 PROCEDURE — 94729 DIFFUSING CAPACITY: CPT

## 2023-05-01 PROCEDURE — 83050 HGB METHEMOGLOBIN QUAN: CPT | Performed by: INTERNAL MEDICINE

## 2023-05-01 RX ORDER — BUDESONIDE AND FORMOTEROL FUMARATE DIHYDRATE 160; 4.5 UG/1; UG/1
2 AEROSOL RESPIRATORY (INHALATION) 2 TIMES DAILY
Qty: 10.2 G | Refills: 5 | Status: SHIPPED | OUTPATIENT
Start: 2023-05-01

## 2023-05-01 RX ORDER — LEVALBUTEROL INHALATION SOLUTION 1.25 MG/3ML
1.25 SOLUTION RESPIRATORY (INHALATION) ONCE
Status: COMPLETED | OUTPATIENT
Start: 2023-05-01 | End: 2023-05-01

## 2023-05-01 RX ORDER — ALBUTEROL SULFATE 90 UG/1
2 AEROSOL, METERED RESPIRATORY (INHALATION) EVERY 6 HOURS PRN
Qty: 18 G | Refills: 5 | Status: SHIPPED | OUTPATIENT
Start: 2023-05-01

## 2023-05-01 RX ORDER — PREDNISONE 10 MG/1
TABLET ORAL
Qty: 31 TABLET | Refills: 0 | Status: SHIPPED | OUTPATIENT
Start: 2023-05-01

## 2023-05-01 RX ADMIN — LEVALBUTEROL HYDROCHLORIDE 1.25 MG: 1.25 SOLUTION RESPIRATORY (INHALATION) at 08:27

## 2023-05-01 NOTE — TELEPHONE ENCOUNTER
----- Message from Jamarcus Doss MD sent at 4/29/2023  1:09 PM EDT -----  Regarding: FW:  Noted, ask him to stop by for a Prevnar 20  ----- Message -----  From: Marylou Ricardo LPN  Sent: 4/28/2023   2:11 PM EDT  To: Jamarcus Doss MD    Parasr said nothing other than a flu shot in 2019.   ----- Message -----  From: Jamarcus Doss MD  Sent: 4/28/2023   1:31 PM EDT  To: [facility]    PLEASE ASK SONJA IF THEY HAVE GIVEN THIS MAN A PNEUMONIA SHOT OR A SHIGLES VACCINE

## 2023-05-01 NOTE — PROGRESS NOTES
Primary Care Provider  Jamarcus Doss MD   Referring Provider  No ref. provider found    Patient Complaint  acute, COPD, and Shortness of Breath      SUBJECTIVE    History of Presenting Illness  Kennedy Lin is a pleasant 77 y.o. male who presents to [facility] PULMONARY & [facility]IT[facility]ARE MEDICINE for acute visit in COPD clinic for shortness of air.  Patient was recently seen by doctor who saw 2 weeks ago and was started on Trelegy 100.  Patient presents today for shortness of air with exertional activities.  Patient has a history of significant coronary artery disease, emphysema on CT and tobacco abuse in remission.  Patient is here with his spouse today.  Patient's O2 is 96% on room air.  Patient last saw Dr. Ko on and was given Trelegy 100 to trial.  Patient states he did not feel this was working for him at all.  His son gave him some Symbicort and he felt better with using Symbicort.  Patient does have albuterol inhaler at home that is prescribed by his PCP.  He does not use a nebulizer machine.  Patient had his PFT done today.  Patient states he is not having any coughing, wheezing, headaches, chest pain, weight loss or hemoptysis. Denies fevers, chills and night sweats. Kennedy Lin is able to perform ADLs without difficulties and denies any swollen glands/lymph nodes in the head or neck.  Patient states he has had 3 UTIs in the past 4 months.  He just recently saw his PCP on Friday and is started on Bactrim and referred to urology.  He did have blood work done had a white count of 21.47 which was addressed by his PCP and patient is currently on antibiotic for UTI.  Patient states the last time he had this type of shortness of air he also had a UTI.  Patient is under the care of Dr. Cornel Gill for his coronary artery disease.  He recently had a low-dose lung cancer screening done 4/7/2023 which demonstrated no suspicious lung nodules, but did reveal mild centrilobular  emphysema.    I have personally reviewed the review of systems, past family, social, medical and surgical histories; and agree with their findings.    Review of Systems  Constitutional symptoms:  Denied complaints   Ear, nose, throat: Denied complaints  Cardiovascular:  Denied complaints  Respiratory: Shortness of air with exertion  Gastrointestinal: Denied complaints  Musculoskeletal: Denied complaints    Family History   Problem Relation Age of Onset   • COPD Mother    • Other Father         Brain tumor    • Diabetes Maternal Grandmother    • Hypertension Paternal Grandfather    • Other Paternal Grandfather         acute myocardial infarction    • Colon cancer Neg Hx    • Prostate cancer Neg Hx    • Thyroid disease Neg Hx         Social History     Socioeconomic History   • Marital status:    Tobacco Use   • Smoking status: Former     Packs/day: 1.50     Years: 15.00     Pack years: 22.50     Types: Cigarettes     Start date: 1965     Quit date: 3/23/1986     Years since quittin.1     Passive exposure: Past   • Smokeless tobacco: Current     Types: Chew   Vaping Use   • Vaping Use: Never used   Substance and Sexual Activity   • Alcohol use: Yes     Alcohol/week: 1.0 standard drink     Types: 1 Cans of beer per week   • Drug use: No   • Sexual activity: Yes     Partners: Female        Past Medical History:   Diagnosis Date   • Allergic rhinitis    • Asthma    • Broken bones    • Coccygeal fracture    • Coronary artery disease    • Diabetes mellitus, type 2    • Elevated hemoglobin A1c    • Encounter for Medicare annual wellness exam    • History of kidney stones    • Hyperlipidemia    • Hypertension    • Lumbar vertebral fracture    • Malignant melanoma of skin    • Obesity, Class I, BMI 30-34.9    • Post-nasal drainage    • Rash    • Recurrent nephrolithiasis    • Right rotator cuff tear    • Tobacco chew use    • Viral infection    • Vitamin D deficiency         Immunization History    Administered Date(s) Administered   • Fluad Quad 65+ 10/29/2020   • Fluzone High Dose =>65 Years ([facility] ONLY) 10/03/2018, 09/03/2019   • Hepatitis A 12/03/2018       Allergies   Allergen Reactions   • Grass Unknown - Low Severity          Current Outpatient Medications:   •  albuterol sulfate  (90 Base) MCG/ACT inhaler, Inhale 2 puffs Every 6 (Six) Hours As Needed for Wheezing or Shortness of Air., Disp: 18 g, Rfl: 5  •  coenzyme Q10 100 MG capsule, Take 1 capsule by mouth Daily., Disp: , Rfl:   •  glipizide (GLUCOTROL XL) 10 MG 24 hr tablet, Take 1 tablet BY MOUTH ONCE DAILY, Disp: 90 tablet, Rfl: 0  •  hydroCHLOROthiazide (HYDRODIURIL) 25 MG tablet, Take 1 tablet by mouth Daily., Disp: 90 tablet, Rfl: 3  •  ranolazine (Ranexa) 500 MG 12 hr tablet, Take 1 tablet by mouth 2 (Two) Times a Day., Disp: 180 tablet, Rfl: 3  •  Spiriva HandiHaler 18 MCG per inhalation capsule, inhale THE contents of 1 capsule BY MOUTH EVERY DAY, Disp: , Rfl:   •  sulfamethoxazole-trimethoprim (Bactrim DS) 800-160 MG per tablet, Take 1 tablet by mouth 2 (Two) Times a Day., Disp: 60 tablet, Rfl: 0  •  telmisartan (MICARDIS) 80 MG tablet, Take 1 tablet by mouth Daily., Disp: , Rfl:   •  aspirin 81 MG EC tablet, Take 1 tablet by mouth Daily. (Patient not taking: Reported on 5/1/2023), Disp: , Rfl:   •  budesonide-formoterol (SYMBICORT) 160-4.5 MCG/ACT inhaler, Inhale 2 puffs 2 (Two) Times a Day., Disp: 10.2 g, Rfl: 5  •  predniSONE (DELTASONE) 10 MG tablet, Take 4 tabs daily x 3 days, then take 3 tabs daily x 3 days, then take 2 tabs daily x 3 days, then take 1 tab daily x 3 days, Disp: 31 tablet, Rfl: 0  •  rosuvastatin (CRESTOR) 20 MG tablet, Take 1 tablet by mouth Daily. (Patient not taking: Reported on 5/1/2023), Disp: 90 tablet, Rfl: 3  No current facility-administered medications for this visit.           Vital Signs   /83 (BP Location: Right arm, Patient Position: Sitting, Cuff Size: Adult)   Pulse 83   Temp 98.2 °F  "(36.8 °C) (Tympanic)   Resp 18   Ht 188 cm (74\")   Wt 126 kg (277 lb 3.2 oz)   SpO2 96% Comment: room air  BMI 35.59 kg/m²       OBJECTIVE    Physical Exam  Vital Signs Reviewed   WDWN, Alert, NAD.    HEENT:  PERRL, EOMI.  OP, nares clear  Neck:  Supple, no JVD, no thyromegaly  Chest:  good aeration, clear to auscultation bilaterally, tympanic to percussion bilaterally, no work of breathing noted  CV: RRR, no MGR, pulses 2+, equal.  Abd:  Soft, NT, ND, + BS, no HSM  EXT:  no clubbing, no cyanosis, no edema  Neuro:  A&Ox3, CN grossly intact, no focal deficits.  Skin: No rashes or lesions noted    Results Review  I have personally reviewed the prior office notes, hospital records, labs, and diagnostics.  CT Chest Low Dose Cancer Screening WO [[id number]] (Order [id number])  Order  Status: Final result     Appointment Information    PACS Images     Radiology Images  Study Result    Narrative & Impression   PROCEDURE:  CT CHEST LOW DOSE CANCER SCREENING WO     COMPARISON: [facility], CT, ABDOMEN/PELVIS WITH CONTRAST, 2021, 9:48.  [facility], CT, CT ABDOMEN PELVIS WO CONTRAST, 2021, 16:38.     REASON FOR SCREENING:     Patient is 77 years of age, asymptomatic, and has a smoking history of more   than 30 pack years.  SMOKING STATUS:      Former smoker.  Years since quittin-36                 SCREENING VISIT:       Baseline.                   TECHNIQUE:    Axial unenhanced LDCT images from the apices through mid-kidney were obtained.   Evaluation of solid organs and vascular structures is suboptimal due to the lack of IV contrast.   Imaging was performed on a Siemens Somatom 128 CT scanner.     RADIATION:      CT Dose Index Vol (CTDIvol):      1.76  mGy               Dose Length Product (DLP):        78  mGy-cm     DIAGNOSTIC QUALITY:             Satisfactory     LUNG-RADS CLASSIFICATION:            Lung RADS 1     FINDINGS:          Lung window images reveal mild " centrilobular emphysema.  Mild diffuse bronchial wall thickening is   evident.  1 cm calcified granuloma is seen in the medial left lower lobe.  No airspace disease or   noncalcified lung nodules are evident.     Mediastinal windows reveal no mediastinal, hilar, or axillary adenopathy.  Extensive coronary   artery calcifications are evident.     5 cm left renal cyst was well characterized on prior enhanced CT scan.     CONTINUED ON NEXT PAGE...              IMPRESSION:                 Low-dose screening CT scan of the chest demonstrating no suspicious lung nodule.     Low-dose screening CT scan of the chest is recommended in a year.               TASHIA JOHNSON MD         Electronically Signed and Approved By: TASHIA JOHNSON MD on 4/09/2023 at 10:15          ASSESSMENT         Patient Active Problem List   Diagnosis   • Essential hypertension   • Type 2 diabetes mellitus treated without insulin (HCC)   • Smokeless tobacco use   • Medicare annual wellness visit, subsequent   • Coronary artery disease (moderate per cath but not stented)    • High cholesterol   • Other emphysema   • Other allergic rhinitis   • Colonoscopy declined (Cologuard ordered in 2023)        Encounter Diagnoses   Name Primary?   • Dyspnea on exertion Yes   • Centrilobular emphysema       PLAN  At this time I will start patient on twice daily Symbicort 160/4.5 mcg.  Refill on albuterol inhaler today with instructions in use.  Sending in a steroid Dosepak for patient to have on hand if he has not increased shortness of air.  Patient encouraged to continue with his current antibiotic for his UTI.  Patient to follow back up with his PCP and get labs in 1 month per PCP.  Patient to follow back up in 1 month to review PFT results as well as response to the Symbicort.  Patient instructed to call 911 or go emergency room if symptoms persist or worsen.    Diagnoses and all orders for this visit:    1. Dyspnea on exertion (Primary)  -      budesonide-formoterol (SYMBICORT) 160-4.5 MCG/ACT inhaler; Inhale 2 puffs 2 (Two) Times a Day.  Dispense: 10.2 g; Refill: 5  -     albuterol sulfate  (90 Base) MCG/ACT inhaler; Inhale 2 puffs Every 6 (Six) Hours As Needed for Wheezing or Shortness of Air.  Dispense: 18 g; Refill: 5  -     predniSONE (DELTASONE) 10 MG tablet; Take 4 tabs daily x 3 days, then take 3 tabs daily x 3 days, then take 2 tabs daily x 3 days, then take 1 tab daily x 3 days  Dispense: 31 tablet; Refill: 0    2. Centrilobular emphysema  -     budesonide-formoterol (SYMBICORT) 160-4.5 MCG/ACT inhaler; Inhale 2 puffs 2 (Two) Times a Day.  Dispense: 10.2 g; Refill: 5  -     albuterol sulfate  (90 Base) MCG/ACT inhaler; Inhale 2 puffs Every 6 (Six) Hours As Needed for Wheezing or Shortness of Air.  Dispense: 18 g; Refill: 5  -     predniSONE (DELTASONE) 10 MG tablet; Take 4 tabs daily x 3 days, then take 3 tabs daily x 3 days, then take 2 tabs daily x 3 days, then take 1 tab daily x 3 days  Dispense: 31 tablet; Refill: 0           Smoking status: Former  Vaccination status: Declines  Medications personally reviewed    Follow Up  Return for Next scheduled follow up.    Patient was given instructions and counseling regarding his condition or for health maintenance advice. Please see specific information pulled into the AVS if appropriate.

## 2023-05-13 DIAGNOSIS — E11.9 TYPE 2 DIABETES MELLITUS WITHOUT COMPLICATION, WITHOUT LONG-TERM CURRENT USE OF INSULIN: ICD-10-CM

## 2023-05-15 RX ORDER — GLIPIZIDE 10 MG/1
TABLET, FILM COATED, EXTENDED RELEASE ORAL
Qty: 90 TABLET | Refills: 0 | Status: SHIPPED | OUTPATIENT
Start: 2023-05-15

## 2023-05-25 RX ORDER — HYDROCHLOROTHIAZIDE 25 MG/1
TABLET ORAL
Qty: 90 TABLET | Refills: 1 | Status: SHIPPED | OUTPATIENT
Start: 2023-05-25

## 2023-05-31 NOTE — PROGRESS NOTES
Primary Care Provider  Jamarcus Doss MD   Referring Provider  No ref. provider found    Patient Complaint  Follow-up, Shortness of Breath, and Emphysema      SUBJECTIVE    History of Presenting Illness  Kennedy Lin is a pleasant 77 y.o. male who presents to [facility] PULMONARY & CRITICAL CARE MEDICINE for 1 month follow-up appointment.  I last saw the patient 5/1/2023 in the COPD clinic.  Patient is following up today to get results of his PFT as well as his response to Symbicort which he was started on at last visit.  Patient's pulmonary function test shows moderately severe airway obstruction with good response to the bronchodilator normal lung volumes and moderately severe reduction in diffusion capacity.  Patient states he does have shortness of air with exertional activities.  He does use his albuterol inhaler when he does have shortness of air.  He continues to be on Spiriva and Symbicort.    Denies coughing, wheezing, headaches, chest pain, weight loss or hemoptysis. Denies fevers, chills and night sweats. Kennedy Lin is able to perform ADLs without difficulties and denies any swollen glands/lymph nodes in the head or neck.    I have personally reviewed the review of systems, past family, social, medical and surgical histories; and agree with their findings.    Review of Systems  Constitutional symptoms:  Denied complaints   Ear, nose, throat: Denied complaints  Cardiovascular:  Denied complaints  Respiratory: Shortness of air with exertional activities reviewed with patient his PFT  Gastrointestinal: Denied complaints  Musculoskeletal: Denied complaints    Family History   Problem Relation Age of Onset   • COPD Mother    • Other Father         Brain tumor    • Diabetes Maternal Grandmother    • Hypertension Paternal Grandfather    • Other Paternal Grandfather         acute myocardial infarction    • Colon cancer Neg Hx    • Prostate cancer Neg Hx    • Thyroid disease  Neg Hx         Social History     Socioeconomic History   • Marital status:    Tobacco Use   • Smoking status: Former     Packs/day: 1.50     Years: 15.00     Pack years: 22.50     Types: Cigarettes     Start date: 1965     Quit date: 3/23/1986     Years since quittin.2     Passive exposure: Past   • Smokeless tobacco: Current     Types: Chew   Vaping Use   • Vaping Use: Never used   Substance and Sexual Activity   • Alcohol use: Yes     Alcohol/week: 1.0 standard drink     Types: 1 Cans of beer per week   • Drug use: No   • Sexual activity: Yes     Partners: Female        Past Medical History:   Diagnosis Date   • Allergic rhinitis    • Asthma    • Broken bones    • Coccygeal fracture    • Coronary artery disease    • Diabetes mellitus, type 2    • Elevated hemoglobin A1c    • Encounter for Medicare annual wellness exam    • History of kidney stones    • Hyperlipidemia    • Hypertension    • Lumbar vertebral fracture    • Malignant melanoma of skin    • Obesity, Class I, BMI 30-34.9    • Post-nasal drainage    • Pulmonary arterial hypertension    • Rash    • Recurrent nephrolithiasis    • Right rotator cuff tear    • Tobacco chew use    • Viral infection    • Vitamin D deficiency         Immunization History   Administered Date(s) Administered   • Fluad Quad 65+ 10/29/2020   • Fluzone High Dose =>65 Years ([facility] ONLY) 10/03/2018, 2019   • Hepatitis A 2018       Allergies   Allergen Reactions   • Grass Unknown - Low Severity          Current Outpatient Medications:   •  albuterol sulfate  (90 Base) MCG/ACT inhaler, Inhale 2 puffs Every 6 (Six) Hours As Needed for Wheezing or Shortness of Air., Disp: 18 g, Rfl: 5  •  budesonide-formoterol (SYMBICORT) 160-4.5 MCG/ACT inhaler, Inhale 2 puffs 2 (Two) Times a Day., Disp: 10.2 g, Rfl: 5  •  coenzyme Q10 100 MG capsule, Take 1 capsule by mouth Daily., Disp: , Rfl:   •  glipizide (GLUCOTROL XL) 10 MG 24 hr tablet, TAKE 1 TABLET BY MOUTH  "ONCE DAILY, Disp: 90 tablet, Rfl: 0  •  hydroCHLOROthiazide (HYDRODIURIL) 25 MG tablet, Take 1 tablet BY MOUTH EVERY DAY, Disp: 90 tablet, Rfl: 1  •  ranolazine (Ranexa) 500 MG 12 hr tablet, Take 1 tablet by mouth 2 (Two) Times a Day., Disp: 180 tablet, Rfl: 3  •  Spiriva HandiHaler 18 MCG per inhalation capsule, Place 1 capsule into inhaler and inhale Daily., Disp: 30 capsule, Rfl: 11  •  telmisartan (MICARDIS) 80 MG tablet, Take 1 tablet by mouth Daily., Disp: , Rfl:   •  aspirin 81 MG EC tablet, Take 1 tablet by mouth Daily. (Patient not taking: Reported on 5/1/2023), Disp: , Rfl:   •  rosuvastatin (CRESTOR) 20 MG tablet, Take 1 tablet by mouth Daily. (Patient not taking: Reported on 5/1/2023), Disp: 90 tablet, Rfl: 3           Vital Signs   /53 (BP Location: Left arm, Patient Position: Sitting, Cuff Size: Adult)   Pulse 69   Temp 98.4 °F (36.9 °C) (Tympanic)   Resp 18   Ht 188 cm (74\") Comment: PER PT  Wt 123 kg (271 lb 6.4 oz)   SpO2 96% Comment: ROOM AIR  BMI 34.85 kg/m²       OBJECTIVE    Physical Exam  Vital Signs Reviewed   WDWN, Alert, NAD.    HEENT:  PERRL, EOMI.  OP, nares clear  Neck:  Supple, no JVD, no thyromegaly  Chest:  good aeration, clear to auscultation bilaterally, tympanic to percussion bilaterally, no work of breathing noted  CV: RRR, no MGR, pulses 2+, equal.  Abd:  Soft, NT, ND, + BS, no HSM  EXT:  no clubbing, no cyanosis, no edema  Neuro:  A&Ox3, CN grossly intact, no focal deficits.  Skin: No rashes or lesions noted    Results Review  I have personally reviewed the prior office notes, hospital records, labs, and diagnostics.  Results  Full Pulmonary Function Test With Bronchodilator & ABG (Order [id number])  Order-Level Documents:    Scan on 5/1/2023 by Moe Ko MD: PULMONARY FUNCTION TEST, [facility] ([place] 05/01/2023         Author: -- Service: -- Author Type: --   Filed:  Date of Service:  Creation Time:    Status: (Other)   Pulmonary Function Test Interpretation  Kennedy ZAMORA" Riley  [id number]     05/03/23  07:59 EDT     Spirometry  PFT shows a forced vital capacity which is 60% of predicted FEV1 is 44% of predicted FEV1 to FVC ratio is 54 postbronchodilator the FEV1 was 55% of predicted  Total lung capacity is 90% of predicted  Diffusion capacity is 63% of predicted  Assessment  Moderately severe airways obstruction with good response to bronchodilators  Normal lung volumes  Moderately severe reduction in diffusion capacity        Electronically signed by Moe Ko MD, 05/03/23, 7:59 AM EDT.             File Link    Scan on 5/1/2023 by Moe Ko MD: PULMONARY FUNCTION TEST, [facility], 05/01/2023        Key Information    Document ID File Type Document Type Description   [id number] Image PULMONARY FUNCTION TEST - SCAN PULMONARY FUNCTION TEST, [facility], 05/01/2023     Import Information    Attached At Date Time User Dept   Order Level 5/1/2023  Moe Ko MD      Order    Pulmonary Function Test [[id number]]     Encounter    Hospital Encounter on 5/1/23 with  [facility]       ASSESSMENT         Patient Active Problem List   Diagnosis   • Essential hypertension   • Type 2 diabetes mellitus treated without insulin (HCC)   • Smokeless tobacco use   • Medicare annual wellness visit, subsequent   • Coronary artery disease (moderate per cath but not stented)    • High cholesterol   • Other emphysema   • Other allergic rhinitis   • Colonoscopy declined (Cologuard ordered in 2023)        Encounter Diagnoses   Name Primary?   • Centrilobular emphysema Yes   • Dyspnea on exertion    • Tobacco abuse, in remission       PLAN  I reviewed with patient his PFT and CT.  Encouraged to use incentive spirometer provided instructions in use.  Refills on his medications sent to his pharmacy.  Patient will follow back up in 3 months or sooner if needed.  Diagnoses and all orders for this visit:    1. Centrilobular emphysema (Primary)  -     Spiriva HandiHaler 18 MCG per inhalation  capsule; Place 1 capsule into inhaler and inhale Daily.  Dispense: 30 capsule; Refill: 11  -     budesonide-formoterol (SYMBICORT) 160-4.5 MCG/ACT inhaler; Inhale 2 puffs 2 (Two) Times a Day.  Dispense: 10.2 g; Refill: 5  -     albuterol sulfate  (90 Base) MCG/ACT inhaler; Inhale 2 puffs Every 6 (Six) Hours As Needed for Wheezing or Shortness of Air.  Dispense: 18 g; Refill: 5  -     Oscillating Positive Expiratory Pressure (OPEP); Future    2. Dyspnea on exertion  -     Spiriva HandiHaler 18 MCG per inhalation capsule; Place 1 capsule into inhaler and inhale Daily.  Dispense: 30 capsule; Refill: 11  -     budesonide-formoterol (SYMBICORT) 160-4.5 MCG/ACT inhaler; Inhale 2 puffs 2 (Two) Times a Day.  Dispense: 10.2 g; Refill: 5  -     albuterol sulfate  (90 Base) MCG/ACT inhaler; Inhale 2 puffs Every 6 (Six) Hours As Needed for Wheezing or Shortness of Air.  Dispense: 18 g; Refill: 5  -     Oscillating Positive Expiratory Pressure (OPEP); Future    3. Tobacco abuse, in remission  -     Oscillating Positive Expiratory Pressure (OPEP); Future           Smoking status: Former  Vaccination status: Declines  Medications personally reviewed    Follow Up  Return in 3 months (on 9/2/2023).    Patient was given instructions and counseling regarding his condition or for health maintenance advice. Please see specific information pulled into the AVS if appropriate.

## 2023-06-02 ENCOUNTER — OFFICE VISIT (OUTPATIENT)
Dept: PULMONOLOGY | Facility: CLINIC | Age: 78
End: 2023-06-02

## 2023-06-02 VITALS
HEART RATE: 69 BPM | HEIGHT: 74 IN | RESPIRATION RATE: 18 BRPM | DIASTOLIC BLOOD PRESSURE: 53 MMHG | OXYGEN SATURATION: 96 % | TEMPERATURE: 98.4 F | SYSTOLIC BLOOD PRESSURE: 121 MMHG | BODY MASS INDEX: 34.83 KG/M2 | WEIGHT: 271.4 LBS

## 2023-06-02 DIAGNOSIS — J43.2 CENTRILOBULAR EMPHYSEMA: Primary | ICD-10-CM

## 2023-06-02 DIAGNOSIS — R06.09 DYSPNEA ON EXERTION: ICD-10-CM

## 2023-06-02 DIAGNOSIS — F17.201 TOBACCO ABUSE, IN REMISSION: ICD-10-CM

## 2023-06-02 RX ORDER — BUDESONIDE AND FORMOTEROL FUMARATE DIHYDRATE 160; 4.5 UG/1; UG/1
2 AEROSOL RESPIRATORY (INHALATION) 2 TIMES DAILY
Qty: 10.2 G | Refills: 5 | Status: SHIPPED | OUTPATIENT
Start: 2023-06-02

## 2023-06-02 RX ORDER — ALBUTEROL SULFATE 90 UG/1
2 AEROSOL, METERED RESPIRATORY (INHALATION) EVERY 6 HOURS PRN
Qty: 18 G | Refills: 5 | Status: SHIPPED | OUTPATIENT
Start: 2023-06-02

## 2023-06-02 RX ORDER — TIOTROPIUM BROMIDE 18 UG/1
1 CAPSULE ORAL; RESPIRATORY (INHALATION)
Qty: 30 CAPSULE | Refills: 11 | Status: SHIPPED | OUTPATIENT
Start: 2023-06-02

## 2023-06-06 ENCOUNTER — TELEPHONE (OUTPATIENT)
Dept: FAMILY MEDICINE CLINIC | Age: 78
End: 2023-06-06
Payer: MEDICARE

## 2023-06-08 RX ORDER — RANOLAZINE 500 MG/1
TABLET, EXTENDED RELEASE ORAL
Qty: 180 TABLET | Refills: 3 | Status: SHIPPED | OUTPATIENT
Start: 2023-06-08

## 2023-06-13 ENCOUNTER — LAB (OUTPATIENT)
Dept: LAB | Facility: HOSPITAL | Age: 78
End: 2023-06-13
Payer: MEDICARE

## 2023-06-13 DIAGNOSIS — D72.829 LEUKOCYTOSIS, UNSPECIFIED TYPE: ICD-10-CM

## 2023-06-13 DIAGNOSIS — R97.20 ELEVATED PSA: ICD-10-CM

## 2023-06-13 DIAGNOSIS — N39.0 URINARY TRACT INFECTION WITHOUT HEMATURIA, SITE UNSPECIFIED: ICD-10-CM

## 2023-06-13 LAB
BASOPHILS # BLD AUTO: 0.03 10*3/MM3 (ref 0–0.2)
BASOPHILS NFR BLD AUTO: 0.4 % (ref 0–1.5)
BILIRUB UR QL STRIP: NEGATIVE
CLARITY UR: CLEAR
COLOR UR: YELLOW
DEPRECATED RDW RBC AUTO: 49.5 FL (ref 37–54)
EOSINOPHIL # BLD AUTO: 0.11 10*3/MM3 (ref 0–0.4)
EOSINOPHIL NFR BLD AUTO: 1.5 % (ref 0.3–6.2)
ERYTHROCYTE [DISTWIDTH] IN BLOOD BY AUTOMATED COUNT: 14 % (ref 12.3–15.4)
GLUCOSE UR STRIP-MCNC: NEGATIVE MG/DL
HCT VFR BLD AUTO: 38.6 % (ref 37.5–51)
HGB BLD-MCNC: 12.3 G/DL (ref 13–17.7)
HGB UR QL STRIP.AUTO: NEGATIVE
IMM GRANULOCYTES # BLD AUTO: 0.04 10*3/MM3 (ref 0–0.05)
IMM GRANULOCYTES NFR BLD AUTO: 0.5 % (ref 0–0.5)
KETONES UR QL STRIP: NEGATIVE
LEUKOCYTE ESTERASE UR QL STRIP.AUTO: NEGATIVE
LYMPHOCYTES # BLD AUTO: 1.74 10*3/MM3 (ref 0.7–3.1)
LYMPHOCYTES NFR BLD AUTO: 23.8 % (ref 19.6–45.3)
MCH RBC QN AUTO: 30.1 PG (ref 26.6–33)
MCHC RBC AUTO-ENTMCNC: 31.9 G/DL (ref 31.5–35.7)
MCV RBC AUTO: 94.6 FL (ref 79–97)
MONOCYTES # BLD AUTO: 0.81 10*3/MM3 (ref 0.1–0.9)
MONOCYTES NFR BLD AUTO: 11.1 % (ref 5–12)
NEUTROPHILS NFR BLD AUTO: 4.59 10*3/MM3 (ref 1.7–7)
NEUTROPHILS NFR BLD AUTO: 62.7 % (ref 42.7–76)
NITRITE UR QL STRIP: NEGATIVE
PH UR STRIP.AUTO: 5.5 [PH] (ref 5–8)
PLATELET # BLD AUTO: 206 10*3/MM3 (ref 140–450)
PMV BLD AUTO: 10.6 FL (ref 6–12)
PROT UR QL STRIP: NEGATIVE
PSA SERPL-MCNC: 1.43 NG/ML (ref 0–4)
RBC # BLD AUTO: 4.08 10*6/MM3 (ref 4.14–5.8)
SP GR UR STRIP: 1.02 (ref 1–1.03)
UROBILINOGEN UR QL STRIP: NORMAL
WBC NRBC COR # BLD: 7.32 10*3/MM3 (ref 3.4–10.8)

## 2023-06-13 PROCEDURE — 85025 COMPLETE CBC W/AUTO DIFF WBC: CPT

## 2023-06-13 PROCEDURE — 84153 ASSAY OF PSA TOTAL: CPT

## 2023-06-13 PROCEDURE — 81003 URINALYSIS AUTO W/O SCOPE: CPT

## 2023-06-13 PROCEDURE — 36415 COLL VENOUS BLD VENIPUNCTURE: CPT

## 2023-08-03 RX ORDER — ROSUVASTATIN CALCIUM 20 MG/1
TABLET, COATED ORAL
Qty: 90 TABLET | Refills: 3 | Status: SHIPPED | OUTPATIENT
Start: 2023-08-03

## 2023-08-11 DIAGNOSIS — E11.9 TYPE 2 DIABETES MELLITUS WITHOUT COMPLICATION, WITHOUT LONG-TERM CURRENT USE OF INSULIN: ICD-10-CM

## 2023-08-11 RX ORDER — GLIPIZIDE 10 MG/1
TABLET, FILM COATED, EXTENDED RELEASE ORAL
Qty: 90 TABLET | Refills: 0 | Status: SHIPPED | OUTPATIENT
Start: 2023-08-11

## 2023-08-14 ENCOUNTER — OFFICE VISIT (OUTPATIENT)
Dept: UROLOGY | Facility: CLINIC | Age: 78
End: 2023-08-14
Payer: MEDICARE

## 2023-08-14 VITALS — RESPIRATION RATE: 16 BRPM | BODY MASS INDEX: 35.88 KG/M2 | WEIGHT: 279.6 LBS | HEIGHT: 74 IN

## 2023-08-14 DIAGNOSIS — R39.11 BENIGN PROSTATIC HYPERPLASIA WITH URINARY HESITANCY: Primary | ICD-10-CM

## 2023-08-14 DIAGNOSIS — N40.1 BENIGN PROSTATIC HYPERPLASIA WITH URINARY HESITANCY: Primary | ICD-10-CM

## 2023-08-14 LAB
BILIRUB BLD-MCNC: NEGATIVE MG/DL
CLARITY, POC: CLEAR
COLOR UR: YELLOW
EXPIRATION DATE: ABNORMAL
GLUCOSE UR STRIP-MCNC: NEGATIVE MG/DL
KETONES UR QL: NEGATIVE
LEUKOCYTE EST, POC: ABNORMAL
Lab: ABNORMAL
NITRITE UR-MCNC: NEGATIVE MG/ML
PH UR: 6 [PH] (ref 5–8)
PROT UR STRIP-MCNC: NEGATIVE MG/DL
RBC # UR STRIP: NEGATIVE /UL
SP GR UR: 1.02 (ref 1–1.03)
UROBILINOGEN UR QL: NORMAL

## 2023-08-14 PROCEDURE — 1159F MED LIST DOCD IN RCRD: CPT | Performed by: NURSE PRACTITIONER

## 2023-08-14 PROCEDURE — 1160F RVW MEDS BY RX/DR IN RCRD: CPT | Performed by: NURSE PRACTITIONER

## 2023-08-14 PROCEDURE — 99213 OFFICE O/P EST LOW 20 MIN: CPT | Performed by: NURSE PRACTITIONER

## 2023-08-14 PROCEDURE — 81003 URINALYSIS AUTO W/O SCOPE: CPT | Performed by: NURSE PRACTITIONER

## 2023-08-14 RX ORDER — TAMSULOSIN HYDROCHLORIDE 0.4 MG/1
2 CAPSULE ORAL DAILY
Qty: 180 CAPSULE | Refills: 4 | Status: SHIPPED | OUTPATIENT
Start: 2023-08-14

## 2023-08-14 NOTE — PROGRESS NOTES
Chief Complaint: Benign Prostatic Hypertrophy    Subjective         History of Present Illness  Kennedy Lin is a 77 y.o. male presents to [facility] to be seen for urinary frequency.    At last visit we started him on tamsulosn 0.4mg q day to see if this would help with his weak stream, urgency and split and spraying stream.     He states he has not seen a lot of improvement with this thus far.    He states he is still with dribbling.    He has not had the chance to use the sildenafil.    He states he has no dysuria.     Nocturia x 0-1 still.      Previous:   Patient was seen for Medicare wellness visit in April of this year.    Patient had labs ordered and subsequently had a PSA level that returned as 6.331 prior PSA was 1.69.  His labs indicated he possibly had a urinary tract infection however urine culture was positive for greater than 100,000 colony-forming units per milliliter of E. coli which was resistant to ampicillin and ampicillin/sulbactam.  It appears that he was treated with Bactrim for 1 month.    He had PSA level repeated on 6/13/2023 which revealed a PSA of 1.43.    He is still with some urgency with urination.     He states the stream is weak at times.     He states a split and spraying stream.    He states some postvoid dribble.    He denies any straining.    He does state he has to think about trying to void.    Nocturia x 0-1.    He does state issues with erections.     He states remote smoking hx quit over 30 years ago smoked for 20 years 1 ppd.    He reports no Family HX of  malignancies.        Objective     Past Medical History:   Diagnosis Date    Allergic rhinitis     Asthma     Broken bones     Coccygeal fracture     Coronary artery disease     Diabetes mellitus, type 2     Elevated hemoglobin A1c     Encounter for Medicare annual wellness exam     History of kidney stones     Hyperlipidemia     Hypertension     Lumbar vertebral fracture     Malignant  melanoma of skin     Obesity, Class I, BMI 30-34.9     Post-nasal drainage     Pulmonary arterial hypertension     Rash     Recurrent nephrolithiasis     Right rotator cuff tear     Tobacco chew use     Viral infection     Vitamin D deficiency        Past Surgical History:   Procedure Laterality Date    CARDIAC CATHETERIZATION N/A 06/01/2022    Procedure: Left Heart Cath-Dr. Gill wants to do this to follow his device case;  Surgeon: ROBINSON Gill MD;  Location: [facility] INVASIVE LOCATION;  Service: Cardiology;  Laterality: N/A;    INGUINAL HERNIA REPAIR      ROTATOR CUFF REPAIR      SHOULDER SURGERY           Current Outpatient Medications:     albuterol sulfate  (90 Base) MCG/ACT inhaler, Inhale 2 puffs Every 6 (Six) Hours As Needed for Wheezing or Shortness of Air., Disp: 18 g, Rfl: 5    aspirin 81 MG EC tablet, Take 1 tablet by mouth Daily., Disp: , Rfl:     coenzyme Q10 100 MG capsule, Take 1 capsule by mouth Daily., Disp: , Rfl:     Fluticasone-Umeclidin-Vilant (Trelegy Ellipta) 200-62.5-25 MCG/ACT aerosol powder , Inhale 200 mcg Daily. Take 1 puff daily, Disp: 1 each, Rfl: 5    glipizide (GLUCOTROL XL) 10 MG 24 hr tablet, TAKE 1 TABLET BY MOUTH ONCE DAILY, Disp: 90 tablet, Rfl: 0    hydroCHLOROthiazide (HYDRODIURIL) 25 MG tablet, Take 1 tablet BY MOUTH EVERY DAY, Disp: 90 tablet, Rfl: 1    ranolazine (RANEXA) 500 MG 12 hr tablet, Take 1 tablet BY MOUTH TWICE DAILY, Disp: 180 tablet, Rfl: 3    rosuvastatin (CRESTOR) 20 MG tablet, Take 1 tablet BY MOUTH EVERY NIGHT AT BEDTIME, Disp: 90 tablet, Rfl: 3    sildenafil (REVATIO) 20 MG tablet, 1-5 tabs as needed for ed 45 minutes prior to intercourse., Disp: 30 tablet, Rfl: 3    Spiriva HandiHaler 18 MCG per inhalation capsule, Place 1 capsule into inhaler and inhale Daily., Disp: 30 capsule, Rfl: 11    tamsulosin (FLOMAX) 0.4 MG capsule 24 hr capsule, Take 2 capsules by mouth Daily., Disp: 180 capsule, Rfl: 4    telmisartan (MICARDIS) 80 MG tablet, Take  "1 tablet by mouth Daily., Disp: 90 tablet, Rfl: 3    Allergies   Allergen Reactions    Grass Unknown - Low Severity        Family History   Problem Relation Age of Onset    COPD Mother     Other Father         Brain tumor     Diabetes Maternal Grandmother     Hypertension Paternal Grandfather     Other Paternal Grandfather         acute myocardial infarction     Colon cancer Neg Hx     Prostate cancer Neg Hx     Thyroid disease Neg Hx        Social History     Socioeconomic History    Marital status:    Tobacco Use    Smoking status: Former     Packs/day: 1.50     Years: 15.00     Pack years: 22.50     Types: Cigarettes     Start date: 1965     Quit date: 3/23/1986     Years since quittin.4     Passive exposure: Past    Smokeless tobacco: Current     Types: Chew   Vaping Use    Vaping Use: Never used   Substance and Sexual Activity    Alcohol use: Yes     Alcohol/week: 1.0 standard drink     Types: 1 Cans of beer per week    Drug use: No    Sexual activity: Yes     Partners: Female       Vital Signs:   Resp 16   Ht 188 cm (74\")   Wt 127 kg (279 lb 9.6 oz)   BMI 35.90 kg/mý      Physical Exam     Result Review :   The following data was reviewed by: NURY Michaels on 2023:  Results for orders placed or performed in visit on 23   POC Urinalysis Dipstick, Automated    Specimen: Urine   Result Value Ref Range    Color Yellow Yellow, Straw, Dark Yellow, [place]    Clarity, UA Clear Clear    Specific Gravity  1.020 1.005 - 1.030    pH, Urine 6.0 5.0 - 8.0    Leukocytes Small (1+) (A) Negative    Nitrite, UA Negative Negative    Protein, POC Negative Negative mg/dL    Glucose, UA Negative Negative mg/dL    Ketones, UA Negative Negative    Urobilinogen, UA Normal Normal, 0.2 E.U./dL    Bilirubin Negative Negative    Blood, UA Negative Negative    Lot Number [id number]     Expiration Date        PSA          2023    13:42 2023    13:13   PSA   PSA 6.330  1.430       "     Procedures        Assessment and Plan    Diagnoses and all orders for this visit:    1. Benign prostatic hyperplasia with urinary hesitancy (Primary)  -     POC Urinalysis Dipstick, Automated  -     tamsulosin (FLOMAX) 0.4 MG capsule 24 hr capsule; Take 2 capsules by mouth Daily.  Dispense: 180 capsule; Refill: 4        He will try increasing his tamsulosin dosage to 0.8 mg daily and we will follow-up with him in 3 months or sooner if needed.  He will call the office if he is with any new or worsening symptoms.    I spent 15 minutes caring for Kennedy on this date of service. This time includes time spent by me in the following activities:reviewing tests, obtaining and/or reviewing a separately obtained history, performing a medically appropriate examination and/or evaluation , counseling and educating the patient/family/caregiver, ordering medications, tests, or procedures, and documenting information in the medical record  Follow Up   Return in about 3 months (around 11/14/2023) for f/u BPH with PVR .  Patient was given instructions and counseling regarding his condition or for health maintenance advice. Please see specific information pulled into the AVS if appropriate.         This document has been electronically signed by NURY Michaels  August 14, 2023 10:45 EDT

## 2023-10-17 ENCOUNTER — OFFICE VISIT (OUTPATIENT)
Dept: PULMONOLOGY | Facility: CLINIC | Age: 78
End: 2023-10-17
Payer: MEDICARE

## 2023-10-17 VITALS
HEIGHT: 74 IN | WEIGHT: 286 LBS | OXYGEN SATURATION: 96 % | RESPIRATION RATE: 16 BRPM | TEMPERATURE: 98 F | HEART RATE: 76 BPM | SYSTOLIC BLOOD PRESSURE: 137 MMHG | DIASTOLIC BLOOD PRESSURE: 80 MMHG | BODY MASS INDEX: 36.7 KG/M2

## 2023-10-17 DIAGNOSIS — E66.01 MORBID (SEVERE) OBESITY DUE TO EXCESS CALORIES: Primary | ICD-10-CM

## 2023-10-17 DIAGNOSIS — R06.09 DYSPNEA ON EXERTION: ICD-10-CM

## 2023-10-17 DIAGNOSIS — J43.2 CENTRILOBULAR EMPHYSEMA: ICD-10-CM

## 2023-10-17 PROCEDURE — 1160F RVW MEDS BY RX/DR IN RCRD: CPT | Performed by: INTERNAL MEDICINE

## 2023-10-17 PROCEDURE — 1159F MED LIST DOCD IN RCRD: CPT | Performed by: INTERNAL MEDICINE

## 2023-10-17 PROCEDURE — 99214 OFFICE O/P EST MOD 30 MIN: CPT | Performed by: INTERNAL MEDICINE

## 2023-10-17 PROCEDURE — 3075F SYST BP GE 130 - 139MM HG: CPT | Performed by: INTERNAL MEDICINE

## 2023-10-17 PROCEDURE — 3079F DIAST BP 80-89 MM HG: CPT | Performed by: INTERNAL MEDICINE

## 2023-10-17 RX ORDER — ALBUTEROL SULFATE 90 UG/1
2 AEROSOL, METERED RESPIRATORY (INHALATION) EVERY 6 HOURS PRN
Qty: 18 G | Refills: 5 | Status: SHIPPED | OUTPATIENT
Start: 2023-10-17

## 2023-10-17 RX ORDER — FLUTICASONE FUROATE, UMECLIDINIUM BROMIDE AND VILANTEROL TRIFENATATE 200; 62.5; 25 UG/1; UG/1; UG/1
200 POWDER RESPIRATORY (INHALATION) DAILY
Qty: 1 EACH | Refills: 5 | Status: SHIPPED | OUTPATIENT
Start: 2023-10-17

## 2023-10-17 NOTE — PROGRESS NOTES
Pulmonary Office Follow-up    Subjective     Kennedy Lin is seen today at the office for   Chief Complaint   Patient presents with    Follow-up    Emphysema    Shortness of Breath         HPI  Kennedy Lin is a 78 y.o. male with a PMH significant for COPD and coronary artery disease presents for follow-up patient complains of tiredness and fatigue with shortness of breath especially in the evening time and has to take his albuterol inhaler he is using Trelegy daily      Tobacco use history:  Former smoker      Patient Active Problem List   Diagnosis    Essential hypertension    Type 2 diabetes mellitus treated without insulin    Smokeless tobacco use    Medicare annual wellness visit, subsequent    Coronary artery disease (moderate per cath but not stented)     High cholesterol    Other emphysema    Other allergic rhinitis    Colonoscopy declined (Cologuard ordered in 2023)        Review of Systems  Review of Systems   Respiratory:  Positive for shortness of breath.    All other systems reviewed and are negative.    As described in the HPI. Otherwise, remainder of ROS (14 systems) were negative.    Medications, Allergies, Social, and Family Histories reviewed as per EMR.    Objective     Vitals:    10/17/23 1350   BP: 137/80   Pulse: 76   Resp: 16   Temp: 98 °F (36.7 °C)   SpO2: 96%         10/17/23  1350   Weight: 130 kg (286 lb)       Physical Exam  Vitals and nursing note reviewed.   Constitutional:       Appearance: He is obese.   HENT:      Head: Normocephalic and atraumatic.      Nose: Nose normal.      Mouth/Throat:      Mouth: Mucous membranes are moist.      Pharynx: Oropharynx is clear.   Eyes:      Extraocular Movements: Extraocular movements intact.      Conjunctiva/sclera: Conjunctivae normal.      Pupils: Pupils are equal, round, and reactive to light.   Cardiovascular:      Rate and Rhythm: Normal rate and regular rhythm.      Pulses: Normal pulses.      Heart sounds: Normal heart  sounds.   Pulmonary:      Effort: Pulmonary effort is normal.      Breath sounds: Wheezing and rhonchi present.   Abdominal:      General: Abdomen is flat. Bowel sounds are normal.      Palpations: Abdomen is soft.   Musculoskeletal:         General: Normal range of motion.      Cervical back: Normal range of motion and neck supple.   Skin:     General: Skin is warm.      Capillary Refill: Capillary refill takes 2 to 3 seconds.   Neurological:      General: No focal deficit present.      Mental Status: He is alert and oriented to person, place, and time.   Psychiatric:         Mood and Affect: Mood normal.         Behavior: Behavior normal.         No radiology results for the last 90 days.     Assessment & Plan     Diagnoses and all orders for this visit:    1. Centrilobular emphysema  -     albuterol sulfate  (90 Base) MCG/ACT inhaler; Inhale 2 puffs Every 6 (Six) Hours As Needed for Wheezing or Shortness of Air.  Dispense: 18 g; Refill: 5    2. Dyspnea on exertion  -     albuterol sulfate  (90 Base) MCG/ACT inhaler; Inhale 2 puffs Every 6 (Six) Hours As Needed for Wheezing or Shortness of Air.  Dispense: 18 g; Refill: 5    Other orders  -     Fluticasone-Umeclidin-Vilant (Trelegy Ellipta) 200-62.5-25 MCG/ACT aerosol powder ; Inhale 200 mcg Daily. Take 1 puff daily  Dispense: 1 each; Refill: 5         Discussion/ Recommendations:   Patient is advised to reduce weight his BMI is 36.72  Patient is advised Trelegy daily  Albuterol inhaler 2 puffs every 6 hours as needed  Regular exercise  Vaccinations discussed and recommended             Return in about 3 months (around 1/17/2024).          This document has been electronically signed by Moe Ko MD on October 17, 2023 13:59 EDT

## 2023-10-27 ENCOUNTER — TELEPHONE (OUTPATIENT)
Dept: FAMILY MEDICINE CLINIC | Age: 78
End: 2023-10-27
Payer: MEDICARE

## 2023-11-10 DIAGNOSIS — E11.9 TYPE 2 DIABETES MELLITUS WITHOUT COMPLICATION, WITHOUT LONG-TERM CURRENT USE OF INSULIN: ICD-10-CM

## 2023-11-10 NOTE — TELEPHONE ENCOUNTER
Rx Refill Note  Requested Prescriptions     Pending Prescriptions Disp Refills    glipizide (GLUCOTROL XL) 10 MG 24 hr tablet [Pharmacy Med Name: glipizide ER 10 mg tablet, extended release 24 hr] 90 tablet 0     Sig: TAKE 1 TABLET BY MOUTH ONCE DAILY      Last office visit with prescribing clinician: 4/28/2023   Last telemedicine visit with prescribing clinician: Visit date not found   Next office visit with prescribing clinician: Visit date not found  Hemoglobin A1c (04/28/2023 13:42)   Antihyperglycemics Protocol Failed    Marylou Ricardo LPN  11/10/23, 13:48 EST

## 2023-11-11 RX ORDER — GLIPIZIDE 10 MG/1
TABLET, FILM COATED, EXTENDED RELEASE ORAL
Qty: 90 TABLET | Refills: 3 | Status: SHIPPED | OUTPATIENT
Start: 2023-11-11

## 2023-11-16 ENCOUNTER — OFFICE VISIT (OUTPATIENT)
Dept: FAMILY MEDICINE CLINIC | Age: 78
End: 2023-11-16
Payer: MEDICARE

## 2023-11-16 VITALS
WEIGHT: 289.6 LBS | DIASTOLIC BLOOD PRESSURE: 68 MMHG | OXYGEN SATURATION: 95 % | BODY MASS INDEX: 37.17 KG/M2 | SYSTOLIC BLOOD PRESSURE: 131 MMHG | TEMPERATURE: 98 F | HEART RATE: 77 BPM | HEIGHT: 74 IN

## 2023-11-16 DIAGNOSIS — E78.00 HIGH CHOLESTEROL: ICD-10-CM

## 2023-11-16 DIAGNOSIS — I10 ESSENTIAL HYPERTENSION: Primary | ICD-10-CM

## 2023-11-16 DIAGNOSIS — H61.23 HEARING LOSS SECONDARY TO CERUMEN IMPACTION, BILATERAL: ICD-10-CM

## 2023-11-16 DIAGNOSIS — J43.8 OTHER EMPHYSEMA: ICD-10-CM

## 2023-11-16 DIAGNOSIS — E11.9 TYPE 2 DIABETES MELLITUS TREATED WITHOUT INSULIN: ICD-10-CM

## 2023-11-16 PROBLEM — N13.8 BPH WITH OBSTRUCTION/LOWER URINARY TRACT SYMPTOMS: Status: ACTIVE | Noted: 2023-11-16

## 2023-11-16 PROBLEM — Z00.00 MEDICARE ANNUAL WELLNESS VISIT, SUBSEQUENT: Status: RESOLVED | Noted: 2021-11-11 | Resolved: 2023-11-16

## 2023-11-16 PROBLEM — N40.1 BPH WITH OBSTRUCTION/LOWER URINARY TRACT SYMPTOMS: Status: ACTIVE | Noted: 2023-11-16

## 2023-11-16 NOTE — ASSESSMENT & PLAN NOTE
COPD is unchanged.  Discussed monitoring symptoms and use of quick-relief medications and contacting us early in the course of exacerbations.  PLANS PER PULMONARY

## 2023-11-16 NOTE — PROGRESS NOTES
Chief Complaint  Ear Fullness ((B) x 2 days. )    Subjective          Kennedy Lin presents to [facility]  History of Present Illness  --TOLERATING BLOOD PRESSURE MEDICATION WITHOUT APPARENT SIDE EFFECTS  --LAST LIPIDS WERE OK, NO ISSUES WITH MEDS  --HGA1C WAS 7.1 % WITH CURRENT MEDS  --BREATHING IS STABLE WITH CURRENT INHALED MEDS, FOLLOWED BY PULMONARY, NO BETTER--NO WORSE  --POOR HEARING FOR THE PAST FEW DAYS.  USED OTC WAX REMOVAL KIT BUT ONLY GOT WORSE        Allergies   Allergen Reactions    Grass Unknown - Low Severity        Health Maintenance Due   Topic Date Due    COLORECTAL CANCER SCREENING  Never done    TDAP/TD VACCINES (1 - Tdap) Never done    ZOSTER VACCINE (1 of 2) Never done    DIABETIC EYE EXAM  Never done    URINE MICROALBUMIN  11/11/2022    HEMOGLOBIN A1C  10/28/2023        Current Outpatient Medications on File Prior to Visit   Medication Sig    albuterol sulfate  (90 Base) MCG/ACT inhaler Inhale 2 puffs Every 6 (Six) Hours As Needed for Wheezing or Shortness of Air.    aspirin 81 MG EC tablet Take 1 tablet by mouth Daily.    coenzyme Q10 100 MG capsule Take 1 capsule by mouth Daily.    Fluticasone-Umeclidin-Vilant (Trelegy Ellipta) 200-62.5-25 MCG/ACT aerosol powder  Inhale 200 mcg Daily. Take 1 puff daily    glipizide (GLUCOTROL XL) 10 MG 24 hr tablet TAKE 1 TABLET BY MOUTH ONCE DAILY    hydroCHLOROthiazide (HYDRODIURIL) 25 MG tablet Take 1 tablet BY MOUTH EVERY DAY    ranolazine (RANEXA) 500 MG 12 hr tablet Take 1 tablet BY MOUTH TWICE DAILY    rosuvastatin (CRESTOR) 20 MG tablet Take 1 tablet BY MOUTH EVERY NIGHT AT BEDTIME    sildenafil (REVATIO) 20 MG tablet 1-5 tabs as needed for ed 45 minutes prior to intercourse.    Spiriva HandiHaler 18 MCG per inhalation capsule Place 1 capsule into inhaler and inhale Daily.    tamsulosin (FLOMAX) 0.4 MG capsule 24 hr capsule Take 2 capsules by mouth Daily.    telmisartan (MICARDIS) 80 MG tablet Take 1  "tablet by mouth Daily.     No current facility-administered medications on file prior to visit.       Immunization History   Administered Date(s) Administered    Fluad Quad 65+ 10/29/2020    Fluzone High Dose =>65 Years ([employer] ONLY) 10/03/2018, 09/03/2019    Hepatitis A 12/03/2018       Review of Systems   Constitutional:  Negative for activity change, appetite change, chills, fatigue and fever.   HENT:  Positive for hearing loss. Negative for congestion, ear pain, rhinorrhea and sore throat.    Respiratory:  Negative for cough and shortness of breath.    Cardiovascular:  Negative for chest pain, palpitations and leg swelling.   Gastrointestinal:  Negative for abdominal pain, constipation, diarrhea, nausea and vomiting.   Musculoskeletal:  Negative for arthralgias and myalgias.   Neurological:  Negative for headache.        Objective     /68   Pulse 77   Temp 98 °F (36.7 °C) (Oral)   Ht 188 cm (74.02\")   Wt 131 kg (289 lb 9.6 oz)   SpO2 95% Comment: room air  BMI 37.17 kg/m²       Physical Exam  Vitals and nursing note reviewed.   Constitutional:       General: He is not in acute distress.     Appearance: Normal appearance.   HENT:      Ears:      Comments: BILATERAL CERUMEN IMPACTION   Cardiovascular:      Rate and Rhythm: Normal rate and regular rhythm.      Heart sounds: Normal heart sounds. No murmur heard.  Pulmonary:      Effort: Pulmonary effort is normal.      Breath sounds: Normal breath sounds.   Abdominal:      General: Bowel sounds are normal.      Palpations: Abdomen is soft.      Tenderness: There is no abdominal tenderness.   Musculoskeletal:      Cervical back: Neck supple.      Right lower leg: No edema.      Left lower leg: No edema.   Lymphadenopathy:      Cervical: No cervical adenopathy.   Neurological:      General: No focal deficit present.      Mental Status: He is alert.      Cranial Nerves: No cranial nerve deficit.      Coordination: Coordination normal.      Gait: Gait " normal.   Psychiatric:         Mood and Affect: Mood normal.         Behavior: Behavior normal.         Result Review :                             Assessment and Plan      Diagnoses and all orders for this visit:    1. Essential hypertension (Primary)  Assessment & Plan:  Hypertension is improving with treatment.  Continue current treatment regimen.  Blood pressure will be reassessed at the next regular appointment.      2. Hearing loss secondary to cerumen impaction, bilateral    3. High cholesterol  Assessment & Plan:  Lipid abnormalities are improving with treatment.  Nutritional counseling was provided.  Lipids will be reassessed in 6 months.      4. Other emphysema  Assessment & Plan:  COPD is unchanged.  Discussed monitoring symptoms and use of quick-relief medications and contacting us early in the course of exacerbations.  PLANS PER PULMONARY           5. Type 2 diabetes mellitus treated without insulin  Assessment & Plan:  Diabetes is improving with treatment.   Continue current treatment regimen.  Diabetes will be reassessed in 6 months.              Follow Up     Return in about 6 months (around 5/16/2024).    Patient was given instructions and counseling regarding his condition or for health maintenance advice. Please see specific information pulled into the AVS if appropriate.

## 2023-11-20 ENCOUNTER — OFFICE VISIT (OUTPATIENT)
Dept: UROLOGY | Facility: CLINIC | Age: 78
End: 2023-11-20
Payer: MEDICARE

## 2023-11-20 VITALS
WEIGHT: 291.6 LBS | BODY MASS INDEX: 37.42 KG/M2 | HEART RATE: 69 BPM | DIASTOLIC BLOOD PRESSURE: 56 MMHG | RESPIRATION RATE: 14 BRPM | SYSTOLIC BLOOD PRESSURE: 125 MMHG | HEIGHT: 74 IN

## 2023-11-20 DIAGNOSIS — R39.11 BENIGN PROSTATIC HYPERPLASIA WITH URINARY HESITANCY: Primary | ICD-10-CM

## 2023-11-20 DIAGNOSIS — N40.1 BENIGN PROSTATIC HYPERPLASIA WITH URINARY HESITANCY: Primary | ICD-10-CM

## 2023-11-20 RX ORDER — HYDROCHLOROTHIAZIDE 25 MG/1
25 TABLET ORAL DAILY
Qty: 90 TABLET | Refills: 1 | Status: SHIPPED | OUTPATIENT
Start: 2023-11-20

## 2023-11-20 NOTE — PROGRESS NOTES
Chief Complaint: Benign Prostatic Hypertrophy (Pt here for follow up.  Pt is taking Tamsulosin.  )    Subjective         History of Present Illness  Kennedy Lin is a 78 y.o. male presents to [facility] to be seen for urinary frequency.    At last visit we started him on tamsulosn 0.8mg q day to see if this would help with his weak stream, urgency and split and spraying stream.     He states he is still with dribbling but somewhat better.    He states he has no dysuria.     Nocturia x 0    Stream is still weak.     He is still with urgency but this is better.    He states he is still with spraying and splitting stream.    He has to sit to void.      Previous:   Patient was seen for Medicare wellness visit in April of this year.    Patient had labs ordered and subsequently had a PSA level that returned as 6.331 prior PSA was 1.69.  His labs indicated he possibly had a urinary tract infection however urine culture was positive for greater than 100,000 colony-forming units per milliliter of E. coli which was resistant to ampicillin and ampicillin/sulbactam.  It appears that he was treated with Bactrim for 1 month.    He had PSA level repeated on 6/13/2023 which revealed a PSA of 1.43.    He is still with some urgency with urination.     He states the stream is weak at times.     He states a split and spraying stream.    He states some postvoid dribble.    He denies any straining.    He does state he has to think about trying to void.    Nocturia x 0-1.    He does state issues with erections.     He states remote smoking hx quit over 30 years ago smoked for 20 years 1 ppd.    He reports no Family HX of  malignancies.        Objective     Past Medical History:   Diagnosis Date    Allergic rhinitis     Asthma     Broken bones     Coccygeal fracture     Coronary artery disease     Diabetes mellitus, type 2     Elevated hemoglobin A1c     Encounter for Medicare annual wellness exam      History of kidney stones     Hyperlipidemia     Hypertension     Lumbar vertebral fracture     Malignant melanoma of skin     Obesity, Class I, BMI 30-34.9     Other emphysema 03/10/2023    Post-nasal drainage     Pulmonary arterial hypertension     Rash     Recurrent nephrolithiasis     Right rotator cuff tear     Tobacco chew use     Viral infection     Vitamin D deficiency        Past Surgical History:   Procedure Laterality Date    CARDIAC CATHETERIZATION N/A 06/01/2022    Procedure: Left Heart Cath-Dr. Gill wants to do this to follow his device case;  Surgeon: ROBINSON Gill MD;  Location: [facility] INVASIVE LOCATION;  Service: Cardiology;  Laterality: N/A;    INGUINAL HERNIA REPAIR      ROTATOR CUFF REPAIR      SHOULDER SURGERY           Current Outpatient Medications:     albuterol sulfate  (90 Base) MCG/ACT inhaler, Inhale 2 puffs Every 6 (Six) Hours As Needed for Wheezing or Shortness of Air., Disp: 18 g, Rfl: 5    aspirin 81 MG EC tablet, Take 1 tablet by mouth Daily., Disp: , Rfl:     coenzyme Q10 100 MG capsule, Take 1 capsule by mouth Daily., Disp: , Rfl:     Fluticasone-Umeclidin-Vilant (Trelegy Ellipta) 200-62.5-25 MCG/ACT aerosol powder , Inhale 200 mcg Daily. Take 1 puff daily, Disp: 1 each, Rfl: 5    glipizide (GLUCOTROL XL) 10 MG 24 hr tablet, TAKE 1 TABLET BY MOUTH ONCE DAILY, Disp: 90 tablet, Rfl: 3    hydroCHLOROthiazide (HYDRODIURIL) 25 MG tablet, Take 1 tablet BY MOUTH EVERY DAY, Disp: 90 tablet, Rfl: 1    ranolazine (RANEXA) 500 MG 12 hr tablet, Take 1 tablet BY MOUTH TWICE DAILY, Disp: 180 tablet, Rfl: 3    rosuvastatin (CRESTOR) 20 MG tablet, Take 1 tablet BY MOUTH EVERY NIGHT AT BEDTIME, Disp: 90 tablet, Rfl: 3    sildenafil (REVATIO) 20 MG tablet, 1-5 tabs as needed for ed 45 minutes prior to intercourse., Disp: 30 tablet, Rfl: 3    Spiriva HandiHaler 18 MCG per inhalation capsule, Place 1 capsule into inhaler and inhale Daily., Disp: 30 capsule, Rfl: 11    tamsulosin  "(FLOMAX) 0.4 MG capsule 24 hr capsule, Take 2 capsules by mouth Daily., Disp: 180 capsule, Rfl: 4    telmisartan (MICARDIS) 80 MG tablet, Take 1 tablet by mouth Daily., Disp: 90 tablet, Rfl: 3    Allergies   Allergen Reactions    Grass Unknown - Low Severity        Family History   Problem Relation Age of Onset    COPD Mother     Other Father         Brain tumor     Diabetes Maternal Grandmother     Hypertension Paternal Grandfather     Other Paternal Grandfather         acute myocardial infarction     Colon cancer Neg Hx     Prostate cancer Neg Hx     Thyroid disease Neg Hx        Social History     Socioeconomic History    Marital status:    Tobacco Use    Smoking status: Former     Packs/day: 1.50     Years: 15.00     Additional pack years: 0.00     Total pack years: 22.50     Types: Cigarettes     Start date: 1965     Quit date: 3/23/1986     Years since quittin.6     Passive exposure: Past    Smokeless tobacco: Current     Types: Chew   Vaping Use    Vaping Use: Never used   Substance and Sexual Activity    Alcohol use: Yes     Alcohol/week: 1.0 standard drink of alcohol     Types: 1 Cans of beer per week    Drug use: No    Sexual activity: Yes     Partners: Female       Vital Signs:   /56 (BP Location: Left arm, Patient Position: Sitting)   Pulse 69   Resp 14   Ht 188 cm (74\")   Wt 132 kg (291 lb 9.6 oz)   BMI 37.44 kg/m²      Physical Exam     Result Review :   The following data was reviewed by: NURY Michaels on 2023:  Results for orders placed or performed in visit on 23   POC Urinalysis Dipstick, Automated    Specimen: Urine   Result Value Ref Range    Color Yellow Yellow, Straw, Dark Yellow, Shanti    Clarity, UA Clear Clear    Specific Gravity  1.020 1.005 - 1.030    pH, Urine 6.0 5.0 - 8.0    Leukocytes Small (1+) (A) Negative    Nitrite, UA Negative Negative    Protein, POC Negative Negative mg/dL    Glucose, UA Negative Negative mg/dL    Ketones, UA " Negative Negative    Urobilinogen, UA Normal Normal, 0.2 E.U./dL    Bilirubin Negative Negative    Blood, UA Negative Negative    Lot Number [id number]     Expiration Date 2,024/8       PSA          4/28/2023    13:42 6/13/2023    13:13   PSA   PSA 6.330  1.430           Procedures        Assessment and Plan    Diagnoses and all orders for this visit:    1. Benign prostatic hyperplasia with urinary hesitancy (Primary)        We will continue with tamsulosin 0.8mg q day.     We will give him info on aquablation.     We will f/u in 3 months or sooner if needed.      I spent 10 minutes caring for Kennedy on this date of service. This time includes time spent by me in the following activities:reviewing tests, obtaining and/or reviewing a separately obtained history, performing a medically appropriate examination and/or evaluation , counseling and educating the patient/family/caregiver, ordering medications, tests, or procedures, and documenting information in the medical record  Follow Up   Return in about 3 months (around 2/20/2024) for f/u BPH .  Patient was given instructions and counseling regarding his condition or for health maintenance advice. Please see specific information pulled into the AVS if appropriate.         This document has been electronically signed by NURY Michaels  November 20, 2023 10:47 EST

## 2024-01-15 ENCOUNTER — OFFICE VISIT (OUTPATIENT)
Dept: PULMONOLOGY | Facility: CLINIC | Age: 79
End: 2024-01-15
Payer: MEDICARE

## 2024-01-15 ENCOUNTER — HOSPITAL ENCOUNTER (OUTPATIENT)
Dept: GENERAL RADIOLOGY | Facility: HOSPITAL | Age: 79
Discharge: HOME | End: 2024-01-15
Admitting: INTERNAL MEDICINE
Payer: MEDICARE

## 2024-01-15 VITALS
OXYGEN SATURATION: 96 % | RESPIRATION RATE: 18 BRPM | HEIGHT: 72 IN | DIASTOLIC BLOOD PRESSURE: 74 MMHG | WEIGHT: 286 LBS | HEART RATE: 79 BPM | BODY MASS INDEX: 38.74 KG/M2 | SYSTOLIC BLOOD PRESSURE: 117 MMHG | TEMPERATURE: 98.4 F

## 2024-01-15 DIAGNOSIS — E66.01 MORBID (SEVERE) OBESITY DUE TO EXCESS CALORIES: ICD-10-CM

## 2024-01-15 DIAGNOSIS — J44.1 COPD WITH ACUTE EXACERBATION: Primary | ICD-10-CM

## 2024-01-15 DIAGNOSIS — J44.1 COPD WITH ACUTE EXACERBATION: ICD-10-CM

## 2024-01-15 DIAGNOSIS — F17.201 TOBACCO ABUSE, IN REMISSION: ICD-10-CM

## 2024-01-15 PROCEDURE — 3074F SYST BP LT 130 MM HG: CPT | Performed by: INTERNAL MEDICINE

## 2024-01-15 PROCEDURE — 99214 OFFICE O/P EST MOD 30 MIN: CPT | Performed by: INTERNAL MEDICINE

## 2024-01-15 PROCEDURE — 71046 X-RAY EXAM CHEST 2 VIEWS: CPT

## 2024-01-15 PROCEDURE — 1160F RVW MEDS BY RX/DR IN RCRD: CPT | Performed by: INTERNAL MEDICINE

## 2024-01-15 PROCEDURE — 1159F MED LIST DOCD IN RCRD: CPT | Performed by: INTERNAL MEDICINE

## 2024-01-15 PROCEDURE — 3078F DIAST BP <80 MM HG: CPT | Performed by: INTERNAL MEDICINE

## 2024-01-15 RX ORDER — AZITHROMYCIN 250 MG/1
TABLET, FILM COATED ORAL
Qty: 6 TABLET | Refills: 0 | Status: SHIPPED | OUTPATIENT
Start: 2024-01-15

## 2024-01-15 RX ORDER — PREDNISONE 20 MG/1
40 TABLET ORAL DAILY
Qty: 14 TABLET | Refills: 0 | Status: SHIPPED | OUTPATIENT
Start: 2024-01-15

## 2024-01-15 RX ORDER — AZITHROMYCIN 250 MG/1
TABLET, FILM COATED ORAL
Qty: 6 TABLET | Refills: 0 | Status: SHIPPED | OUTPATIENT
Start: 2024-01-15 | End: 2024-01-15 | Stop reason: SDUPTHER

## 2024-01-15 NOTE — PROGRESS NOTES
Pulmonary Office Follow-up    Subjective     Kennedy Lin is seen today at the office for   Chief Complaint   Patient presents with    Follow-up     3 Months    Shortness of Breath    Emphysema    Cough         HPI  Kennedy Lin is a 78 y.o. male with a PMH significant for COPD presents for evaluation patient has been having worsening breathlessness along with cough wheeze and mucopurulent expectoration for the past couple of weeks he has been having some nasal drainage and congestion also patient has already quit smoking he takes Trelegy daily      Tobacco use history:  Former smoker      Patient Active Problem List   Diagnosis    Essential hypertension    Type 2 diabetes mellitus treated without insulin    Smokeless tobacco use    Coronary artery disease (moderate per cath but not stented)     High cholesterol    Other emphysema    Other allergic rhinitis    Colonoscopy declined (Cologuard ordered in 2023)     BPH with obstruction/lower urinary tract symptoms       Review of Systems  Review of Systems   Respiratory:  Positive for cough, shortness of breath and wheezing.    All other systems reviewed and are negative.    As described in the HPI. Otherwise, remainder of ROS (14 systems) were negative.    Medications, Allergies, Social, and Family Histories reviewed as per EMR.    Result Review :       Data reviewed : Radiologic studies chest      Objective     Vitals:    01/15/24 1308   BP: 117/74   Pulse: 79   Resp: 18   Temp: 98.4 °F (36.9 °C)   SpO2: 96%         01/15/24  1308   Weight: 130 kg (286 lb)       Physical Exam  Vitals and nursing note reviewed.   Constitutional:       Appearance: Normal appearance.   HENT:      Head: Normocephalic and atraumatic.      Nose: Nose normal.      Mouth/Throat:      Mouth: Mucous membranes are moist.      Pharynx: Oropharynx is clear.   Eyes:      Extraocular Movements: Extraocular movements intact.      Conjunctiva/sclera: Conjunctivae normal.      Pupils:  Pupils are equal, round, and reactive to light.   Cardiovascular:      Rate and Rhythm: Normal rate and regular rhythm.      Pulses: Normal pulses.      Heart sounds: Normal heart sounds.   Pulmonary:      Effort: Pulmonary effort is normal.      Breath sounds: Wheezing and rhonchi present.   Abdominal:      General: Abdomen is flat. Bowel sounds are normal.      Palpations: Abdomen is soft.   Musculoskeletal:         General: Normal range of motion.      Cervical back: Normal range of motion and neck supple.   Skin:     General: Skin is warm.      Capillary Refill: Capillary refill takes 2 to 3 seconds.   Neurological:      General: No focal deficit present.      Mental Status: He is alert and oriented to person, place, and time.   Psychiatric:         Mood and Affect: Mood normal.         Behavior: Behavior normal.         No radiology results for the last 90 days.     Assessment & Plan     Diagnoses and all orders for this visit:    1. COPD with acute exacerbation (Primary)  -     XR Chest 2 View; Future    2. Morbid (severe) obesity due to excess calories  -     XR Chest 2 View; Future    3. Tobacco abuse, in remission  -     XR Chest 2 View; Future    Other orders  -     Discontinue: azithromycin (ZITHROMAX) 250 MG tablet; Take 2 by mouth today then 1 daily for 4 days  Dispense: 6 tablet; Refill: 0  -     predniSONE (DELTASONE) 20 MG tablet; Take 2 tablets by mouth Daily.  Dispense: 14 tablet; Refill: 0  -     azithromycin (ZITHROMAX) 250 MG tablet; Take 2 by mouth today then 1 daily for 4 days  Dispense: 6 tablet; Refill: 0         Discussion/ Recommendations:   Patient is advised to reduce weight his BMI is 38.79  Continue Trelegy daily  Albuterol inhaler as needed  Will start him on Zithromax and prednisone for COPD exacerbation  Patient has already quit smoking  PFTs were reviewed with the patient showing moderately severe airways obstruction  Vaccinations discussed and recommended             Return in  about 3 months (around 4/15/2024).          This document has been electronically signed by Moe Ko MD on January 15, 2024 13:24 EST

## 2024-01-17 ENCOUNTER — OFFICE VISIT (OUTPATIENT)
Dept: INTERNAL MEDICINE | Facility: CLINIC | Age: 79
End: 2024-01-17
Payer: MEDICARE

## 2024-01-17 VITALS
BODY MASS INDEX: 38.6 KG/M2 | TEMPERATURE: 98.4 F | HEART RATE: 82 BPM | DIASTOLIC BLOOD PRESSURE: 72 MMHG | HEIGHT: 72 IN | WEIGHT: 285 LBS | OXYGEN SATURATION: 95 % | SYSTOLIC BLOOD PRESSURE: 126 MMHG

## 2024-01-17 DIAGNOSIS — N40.1 BPH WITH OBSTRUCTION/LOWER URINARY TRACT SYMPTOMS: ICD-10-CM

## 2024-01-17 DIAGNOSIS — I10 ESSENTIAL HYPERTENSION: ICD-10-CM

## 2024-01-17 DIAGNOSIS — I25.10 CORONARY ARTERY DISEASE INVOLVING NATIVE CORONARY ARTERY OF NATIVE HEART WITHOUT ANGINA PECTORIS: Primary | ICD-10-CM

## 2024-01-17 DIAGNOSIS — Z00.00 ANNUAL PHYSICAL EXAM: ICD-10-CM

## 2024-01-17 DIAGNOSIS — L98.9 SKIN LESION OF BACK: ICD-10-CM

## 2024-01-17 DIAGNOSIS — N40.1 BENIGN PROSTATIC HYPERPLASIA WITH LOWER URINARY TRACT SYMPTOMS, SYMPTOM DETAILS UNSPECIFIED: ICD-10-CM

## 2024-01-17 DIAGNOSIS — R06.09 DYSPNEA ON EXERTION: ICD-10-CM

## 2024-01-17 DIAGNOSIS — J43.8 OTHER EMPHYSEMA: ICD-10-CM

## 2024-01-17 DIAGNOSIS — R97.20 ELEVATED PSA: ICD-10-CM

## 2024-01-17 DIAGNOSIS — E78.2 MIXED HYPERLIPIDEMIA: ICD-10-CM

## 2024-01-17 DIAGNOSIS — E11.9 TYPE 2 DIABETES MELLITUS TREATED WITHOUT INSULIN: ICD-10-CM

## 2024-01-17 DIAGNOSIS — Z12.5 SCREENING PSA (PROSTATE SPECIFIC ANTIGEN): ICD-10-CM

## 2024-01-17 DIAGNOSIS — E55.9 VITAMIN D DEFICIENCY: ICD-10-CM

## 2024-01-17 DIAGNOSIS — N13.8 BPH WITH OBSTRUCTION/LOWER URINARY TRACT SYMPTOMS: ICD-10-CM

## 2024-01-17 LAB
25(OH)D3 SERPL-MCNC: 26.1 NG/ML (ref 30–100)
ALBUMIN SERPL-MCNC: 4.2 G/DL (ref 3.5–5.2)
ALBUMIN UR-MCNC: <1.2 MG/DL
ALBUMIN/GLOB SERPL: 1.5 G/DL
ALP SERPL-CCNC: 56 U/L (ref 39–117)
ALT SERPL W P-5'-P-CCNC: 13 U/L (ref 1–41)
ANION GAP SERPL CALCULATED.3IONS-SCNC: 17.3 MMOL/L (ref 5–15)
AST SERPL-CCNC: 15 U/L (ref 1–40)
BASOPHILS # BLD AUTO: 0.02 10*3/MM3 (ref 0–0.2)
BASOPHILS NFR BLD AUTO: 0.1 % (ref 0–1.5)
BILIRUB SERPL-MCNC: 0.5 MG/DL (ref 0–1.2)
BUN SERPL-MCNC: 23 MG/DL (ref 8–23)
BUN/CREAT SERPL: 17.2 (ref 7–25)
CALCIUM SPEC-SCNC: 9.5 MG/DL (ref 8.6–10.5)
CHLORIDE SERPL-SCNC: 96 MMOL/L (ref 98–107)
CHOLEST SERPL-MCNC: 101 MG/DL (ref 0–200)
CO2 SERPL-SCNC: 25.7 MMOL/L (ref 22–29)
CREAT SERPL-MCNC: 1.34 MG/DL (ref 0.76–1.27)
DEPRECATED RDW RBC AUTO: 40.9 FL (ref 37–54)
EGFRCR SERPLBLD CKD-EPI 2021: 54.6 ML/MIN/1.73
EOSINOPHIL # BLD AUTO: 0.01 10*3/MM3 (ref 0–0.4)
EOSINOPHIL NFR BLD AUTO: 0.1 % (ref 0.3–6.2)
ERYTHROCYTE [DISTWIDTH] IN BLOOD BY AUTOMATED COUNT: 12.3 % (ref 12.3–15.4)
FOLATE SERPL-MCNC: 5.09 NG/ML (ref 4.78–24.2)
GLOBULIN UR ELPH-MCNC: 2.8 GM/DL
GLUCOSE SERPL-MCNC: 180 MG/DL (ref 65–99)
HBA1C MFR BLD: 7.4 % (ref 4.8–5.6)
HCT VFR BLD AUTO: 39.8 % (ref 37.5–51)
HDLC SERPL-MCNC: 24 MG/DL (ref 40–60)
HGB BLD-MCNC: 13.4 G/DL (ref 13–17.7)
IMM GRANULOCYTES # BLD AUTO: 0.07 10*3/MM3 (ref 0–0.05)
IMM GRANULOCYTES NFR BLD AUTO: 0.5 % (ref 0–0.5)
LDLC SERPL CALC-MCNC: 51 MG/DL (ref 0–100)
LDLC/HDLC SERPL: 1.99 {RATIO}
LYMPHOCYTES # BLD AUTO: 1.51 10*3/MM3 (ref 0.7–3.1)
LYMPHOCYTES NFR BLD AUTO: 10.8 % (ref 19.6–45.3)
MAGNESIUM SERPL-MCNC: 2.4 MG/DL (ref 1.6–2.4)
MCH RBC QN AUTO: 30.5 PG (ref 26.6–33)
MCHC RBC AUTO-ENTMCNC: 33.7 G/DL (ref 31.5–35.7)
MCV RBC AUTO: 90.7 FL (ref 79–97)
MONOCYTES # BLD AUTO: 0.94 10*3/MM3 (ref 0.1–0.9)
MONOCYTES NFR BLD AUTO: 6.7 % (ref 5–12)
NEUTROPHILS NFR BLD AUTO: 11.44 10*3/MM3 (ref 1.7–7)
NEUTROPHILS NFR BLD AUTO: 81.8 % (ref 42.7–76)
NRBC BLD AUTO-RTO: 0 /100 WBC (ref 0–0.2)
NT-PROBNP SERPL-MCNC: 405 PG/ML (ref 0–1800)
PLATELET # BLD AUTO: 215 10*3/MM3 (ref 140–450)
PMV BLD AUTO: 11.5 FL (ref 6–12)
POTASSIUM SERPL-SCNC: 4.2 MMOL/L (ref 3.5–5.2)
PROT SERPL-MCNC: 7 G/DL (ref 6–8.5)
PSA SERPL-MCNC: 1.84 NG/ML (ref 0–4)
RBC # BLD AUTO: 4.39 10*6/MM3 (ref 4.14–5.8)
SODIUM SERPL-SCNC: 139 MMOL/L (ref 136–145)
T4 FREE SERPL-MCNC: 1.09 NG/DL (ref 0.93–1.7)
TRIGL SERPL-MCNC: 146 MG/DL (ref 0–150)
TSH SERPL DL<=0.05 MIU/L-ACNC: 1.22 UIU/ML (ref 0.27–4.2)
VIT B12 BLD-MCNC: 453 PG/ML (ref 211–946)
VLDLC SERPL-MCNC: 26 MG/DL (ref 5–40)
WBC NRBC COR # BLD AUTO: 13.99 10*3/MM3 (ref 3.4–10.8)

## 2024-01-17 PROCEDURE — 83880 ASSAY OF NATRIURETIC PEPTIDE: CPT | Performed by: INTERNAL MEDICINE

## 2024-01-17 PROCEDURE — 85025 COMPLETE CBC W/AUTO DIFF WBC: CPT | Performed by: INTERNAL MEDICINE

## 2024-01-17 PROCEDURE — 82043 UR ALBUMIN QUANTITATIVE: CPT | Performed by: INTERNAL MEDICINE

## 2024-01-17 PROCEDURE — 82607 VITAMIN B-12: CPT | Performed by: INTERNAL MEDICINE

## 2024-01-17 PROCEDURE — 84153 ASSAY OF PSA TOTAL: CPT | Performed by: INTERNAL MEDICINE

## 2024-01-17 PROCEDURE — 84443 ASSAY THYROID STIM HORMONE: CPT | Performed by: INTERNAL MEDICINE

## 2024-01-17 PROCEDURE — 83735 ASSAY OF MAGNESIUM: CPT | Performed by: INTERNAL MEDICINE

## 2024-01-17 PROCEDURE — 84439 ASSAY OF FREE THYROXINE: CPT | Performed by: INTERNAL MEDICINE

## 2024-01-17 PROCEDURE — 83036 HEMOGLOBIN GLYCOSYLATED A1C: CPT | Performed by: INTERNAL MEDICINE

## 2024-01-17 PROCEDURE — 82306 VITAMIN D 25 HYDROXY: CPT | Performed by: INTERNAL MEDICINE

## 2024-01-17 PROCEDURE — 82746 ASSAY OF FOLIC ACID SERUM: CPT | Performed by: INTERNAL MEDICINE

## 2024-01-17 PROCEDURE — 80061 LIPID PANEL: CPT | Performed by: INTERNAL MEDICINE

## 2024-01-17 PROCEDURE — 80053 COMPREHEN METABOLIC PANEL: CPT | Performed by: INTERNAL MEDICINE

## 2024-01-17 NOTE — PROGRESS NOTES
"CHIEF COMPLAINT/ HPI:  Establish Care (New pt establish care. Pt is non fasting ( had some cheese crackers). )  Still soa, sees multiple specialists     Doesn't smoke anymore  Objective   Vital Signs  Vitals:    01/17/24 0920   BP: 126/72   Pulse: 82   Temp: 98.4 °F (36.9 °C)   SpO2: 95%   Weight: 129 kg (285 lb)   Height: 182.9 cm (72.01\")      Body mass index is 38.64 kg/m².  Review of Systems   Constitutional: Negative.    HENT: Negative.     Eyes: Negative.    Respiratory: Negative.     Cardiovascular: Negative.    Gastrointestinal: Negative.    Endocrine: Negative.    Genitourinary: Negative.    Musculoskeletal: Negative.    Allergic/Immunologic: Negative.    Neurological: Negative.    Hematological: Negative.    Psychiatric/Behavioral: Negative.        Physical Exam  Constitutional:       General: He is not in acute distress.     Appearance: Normal appearance. He is obese.   HENT:      Head: Normocephalic.      Mouth/Throat:      Mouth: Mucous membranes are moist.   Eyes:      Conjunctiva/sclera: Conjunctivae normal.      Pupils: Pupils are equal, round, and reactive to light.   Cardiovascular:      Rate and Rhythm: Normal rate and regular rhythm.      Pulses: Normal pulses.      Heart sounds: Normal heart sounds.   Pulmonary:      Effort: Pulmonary effort is normal.      Breath sounds: Wheezing and rhonchi present.   Abdominal:      General: Bowel sounds are normal.      Palpations: Abdomen is soft.   Musculoskeletal:         General: No swelling. Normal range of motion.      Cervical back: Neck supple.      Right lower leg: Edema present.      Left lower leg: Edema present.   Skin:     General: Skin is warm and dry.      Coloration: Skin is not jaundiced.   Neurological:      General: No focal deficit present.      Mental Status: He is alert and oriented to person, place, and time. Mental status is at baseline.   Psychiatric:         Mood and Affect: Mood normal.         Behavior: Behavior normal.         " "Thought Content: Thought content normal.         Judgment: Judgment normal.     Patient is a skin lesion on the right mid back infrascapular area that looks more inflammation  Result Review :   No results found for: \"PROBNP\", \"BNP\"  CMP          4/28/2023    13:42   CMP   Glucose 136    BUN 20    Creatinine 1.42    EGFR 51.2    Sodium 135    Potassium 3.8    Chloride 98    Calcium 8.5    Total Protein 6.7    Albumin 3.9    Globulin 2.8    Total Bilirubin 1.1    Alkaline Phosphatase 55    AST (SGOT) 12    ALT (SGPT) 10    Albumin/Globulin Ratio 1.4    BUN/Creatinine Ratio 14.1    Anion Gap 9.0      CBC w/diff          4/28/2023    13:37 4/28/2023    13:42 6/13/2023    13:13   CBC w/Diff   WBC 21.72  21.47  7.32    RBC 4.02  4.01  4.08    Hemoglobin 12.3  12.2  12.3    Hematocrit 37.6  37.7  38.6    MCV 93.5  94.0  94.6    MCH 30.6  30.4  30.1    MCHC 32.7  32.4  31.9    RDW 13.4  13.4  14.0    Platelets 199  189  206    Neutrophil Rel % 81.1   62.7    Immature Granulocyte Rel % 0.3   0.5    Lymphocyte Rel % 7.1   23.8    Monocyte Rel % 11.1   11.1    Eosinophil Rel % 0.1   1.5    Basophil Rel % 0.3   0.4       Lipid Panel          4/28/2023    13:42   Lipid Panel   Total Cholesterol 81    Triglycerides 93    HDL Cholesterol 32    VLDL Cholesterol 18    LDL Cholesterol  31    LDL/HDL Ratio 0.95       Lab Results   Component Value Date    TSH 1.750 04/28/2023    TSH 3.690 11/11/2021      No results found for: \"FREET4\"   A1C Last 3 Results          4/28/2023    13:42   HGBA1C Last 3 Results   Hemoglobin A1C 7.10       PSA          4/28/2023    13:42 6/13/2023    13:13   PSA   PSA 6.330  1.430                     Visit Diagnoses:    ICD-10-CM ICD-9-CM   1. Coronary artery disease (moderate per cath but not stented)   I25.10 414.01   2. Other emphysema  J43.8 492.8   3. Essential hypertension  I10 401.9   4. Type 2 diabetes mellitus treated without insulin  E11.9 250.00   5. Mixed hyperlipidemia  E78.2 272.2   6. BPH with " obstruction/lower urinary tract symptoms  N40.1 600.01    N13.8 599.69   7. Benign prostatic hyperplasia with lower urinary tract symptoms, symptom details unspecified  N40.1 600.01   8. Screening PSA (prostate specific antigen)  Z12.5 V76.44   9. Annual physical exam  Z00.00 V70.0   10. Elevated PSA  R97.20 790.93   11. Vitamin D deficiency  E55.9 268.9   12. Dyspnea on exertion  R06.09 786.09   13. Skin lesion of back  L98.9 709.9       Assessment and Plan   Diagnoses and all orders for this visit:    1. Coronary artery disease (moderate per cath but not stented)  (Primary)  -     CBC & Differential; Future  -     Comprehensive Metabolic Panel; Future  -     Hemoglobin A1c; Future  -     Lipid Panel; Future  -     Vitamin D,25-Hydroxy; Future  -     Vitamin B12 anemia; Future  -     Folate anemia; Future  -     TSH+Free T4; Future  -     proBNP; Future  -     MicroAlbumin, Urine, Random - Urine, Clean Catch; Future  -     Magnesium; Future  -     PSA DIAGNOSTIC; Future    2. Other emphysema  -     CBC & Differential; Future  -     Comprehensive Metabolic Panel; Future  -     Hemoglobin A1c; Future  -     Lipid Panel; Future  -     Vitamin D,25-Hydroxy; Future  -     Vitamin B12 anemia; Future  -     Folate anemia; Future  -     TSH+Free T4; Future  -     proBNP; Future  -     MicroAlbumin, Urine, Random - Urine, Clean Catch; Future  -     Magnesium; Future  -     PSA DIAGNOSTIC; Future    3. Essential hypertension  -     CBC & Differential; Future  -     Comprehensive Metabolic Panel; Future  -     Hemoglobin A1c; Future  -     Lipid Panel; Future  -     Vitamin D,25-Hydroxy; Future  -     Vitamin B12 anemia; Future  -     Folate anemia; Future  -     TSH+Free T4; Future  -     proBNP; Future  -     MicroAlbumin, Urine, Random - Urine, Clean Catch; Future  -     Magnesium; Future  -     PSA DIAGNOSTIC; Future    4. Type 2 diabetes mellitus treated without insulin  -     CBC & Differential; Future  -      Comprehensive Metabolic Panel; Future  -     Hemoglobin A1c; Future  -     Lipid Panel; Future  -     Vitamin D,25-Hydroxy; Future  -     Vitamin B12 anemia; Future  -     Folate anemia; Future  -     TSH+Free T4; Future  -     proBNP; Future  -     MicroAlbumin, Urine, Random - Urine, Clean Catch; Future  -     Magnesium; Future  -     PSA DIAGNOSTIC; Future    5. Mixed hyperlipidemia  -     CBC & Differential; Future  -     Comprehensive Metabolic Panel; Future  -     Hemoglobin A1c; Future  -     Lipid Panel; Future  -     Vitamin D,25-Hydroxy; Future  -     Vitamin B12 anemia; Future  -     Folate anemia; Future  -     TSH+Free T4; Future  -     proBNP; Future  -     MicroAlbumin, Urine, Random - Urine, Clean Catch; Future  -     Magnesium; Future  -     PSA DIAGNOSTIC; Future    6. BPH with obstruction/lower urinary tract symptoms  -     CBC & Differential; Future  -     Comprehensive Metabolic Panel; Future  -     Hemoglobin A1c; Future  -     Lipid Panel; Future  -     Vitamin D,25-Hydroxy; Future  -     Vitamin B12 anemia; Future  -     Folate anemia; Future  -     TSH+Free T4; Future  -     proBNP; Future  -     MicroAlbumin, Urine, Random - Urine, Clean Catch; Future  -     Magnesium; Future  -     PSA DIAGNOSTIC; Future    7. Benign prostatic hyperplasia with lower urinary tract symptoms, symptom details unspecified  -     CBC & Differential; Future  -     Comprehensive Metabolic Panel; Future  -     Hemoglobin A1c; Future  -     Lipid Panel; Future  -     Vitamin D,25-Hydroxy; Future  -     Vitamin B12 anemia; Future  -     Folate anemia; Future  -     TSH+Free T4; Future  -     proBNP; Future  -     MicroAlbumin, Urine, Random - Urine, Clean Catch; Future  -     Magnesium; Future  -     PSA DIAGNOSTIC; Future    8. Screening PSA (prostate specific antigen)  -     CBC & Differential; Future  -     Comprehensive Metabolic Panel; Future  -     Hemoglobin A1c; Future  -     Lipid Panel; Future  -      Vitamin D,25-Hydroxy; Future  -     Vitamin B12 anemia; Future  -     Folate anemia; Future  -     TSH+Free T4; Future  -     proBNP; Future  -     MicroAlbumin, Urine, Random - Urine, Clean Catch; Future  -     Magnesium; Future  -     PSA DIAGNOSTIC; Future    9. Annual physical exam  -     CBC & Differential; Future  -     Comprehensive Metabolic Panel; Future  -     Hemoglobin A1c; Future  -     Lipid Panel; Future  -     Vitamin D,25-Hydroxy; Future  -     Vitamin B12 anemia; Future  -     Folate anemia; Future  -     TSH+Free T4; Future  -     proBNP; Future  -     MicroAlbumin, Urine, Random - Urine, Clean Catch; Future  -     Magnesium; Future  -     PSA DIAGNOSTIC; Future    10. Elevated PSA  -     CBC & Differential; Future  -     Comprehensive Metabolic Panel; Future  -     Hemoglobin A1c; Future  -     Lipid Panel; Future  -     Vitamin D,25-Hydroxy; Future  -     Vitamin B12 anemia; Future  -     Folate anemia; Future  -     TSH+Free T4; Future  -     proBNP; Future  -     MicroAlbumin, Urine, Random - Urine, Clean Catch; Future  -     Magnesium; Future  -     PSA DIAGNOSTIC; Future    11. Vitamin D deficiency  -     CBC & Differential; Future  -     Comprehensive Metabolic Panel; Future  -     Hemoglobin A1c; Future  -     Lipid Panel; Future  -     Vitamin D,25-Hydroxy; Future  -     Vitamin B12 anemia; Future  -     Folate anemia; Future  -     TSH+Free T4; Future  -     proBNP; Future  -     MicroAlbumin, Urine, Random - Urine, Clean Catch; Future  -     Magnesium; Future  -     PSA DIAGNOSTIC; Future    12. Dyspnea on exertion  -     proBNP; Future    13. Skin lesion of back    Skin lesion right infrascapular area will refer to dermatology January 2024 for second opinion,    Cad, cath, --no stents, med mgt. Dr harvey, ---cont ranexa 500 mg bid , crestor 20 mg qd , coq -10     Copd--ct chest low dose , normal march 2023,  cont zithromax 250 mg qd , prednisone recently by pulmonology for upper  respiratory symptoms -----spriva daily , trelegy daily , prn albuterol hfa,   , will consider adding Singulair 10 mg daily    Lower extremity edema 2-3+, treatment options with diuretics discussed, consider changing HydroDIURIL to Lasix or additional, January 17, 2024,    Pulm htn --cont ?      Htn cont telmisartan 80 mg qd , hctz 25 mg qd     Bph, cont flomax 0.4 mg qhs     Dm 2 , cont glipizide xl 10 mg qd will see what his labs look like may consider switching or adding metformin or even one of the new GLP-1 injections,    Overwgt , ? Steroids       Follow Up   Return in about 4 weeks (around 2/14/2024).  Patient was given instructions and counseling regarding his condition or for health maintenance advice. Please see specific information pulled into the AVS if appropriate.

## 2024-01-18 ENCOUNTER — TELEPHONE (OUTPATIENT)
Dept: INTERNAL MEDICINE | Facility: CLINIC | Age: 79
End: 2024-01-18
Payer: MEDICARE

## 2024-01-18 DIAGNOSIS — D72.829 LEUKOCYTOSIS, UNSPECIFIED TYPE: Primary | ICD-10-CM

## 2024-01-18 NOTE — TELEPHONE ENCOUNTER
Call patient tell him I got his blood test back, everything is fairly stable, he has a little bit of kidney disease but I will go over that with him at his next appointment,    Tell him his diabetes test was 7.4,    Tell him his white blood count was 13.9 and we will probably need to recheck that in a couple weeks so I put a new order in for him to do that before I see him next month,

## 2024-01-22 ENCOUNTER — PATIENT ROUNDING (BHMG ONLY) (OUTPATIENT)
Dept: INTERNAL MEDICINE | Facility: CLINIC | Age: 79
End: 2024-01-22
Payer: MEDICARE

## 2024-02-07 ENCOUNTER — LAB (OUTPATIENT)
Dept: LAB | Facility: HOSPITAL | Age: 79
End: 2024-02-07
Payer: MEDICARE

## 2024-02-07 DIAGNOSIS — D72.829 LEUKOCYTOSIS, UNSPECIFIED TYPE: ICD-10-CM

## 2024-02-07 LAB
BASOPHILS # BLD AUTO: 0.02 10*3/MM3 (ref 0–0.2)
BASOPHILS NFR BLD AUTO: 0.3 % (ref 0–1.5)
DEPRECATED RDW RBC AUTO: 44.4 FL (ref 37–54)
EOSINOPHIL # BLD AUTO: 0.28 10*3/MM3 (ref 0–0.4)
EOSINOPHIL NFR BLD AUTO: 4.4 % (ref 0.3–6.2)
ERYTHROCYTE [DISTWIDTH] IN BLOOD BY AUTOMATED COUNT: 13 % (ref 12.3–15.4)
HCT VFR BLD AUTO: 40 % (ref 37.5–51)
HGB BLD-MCNC: 12.8 G/DL (ref 13–17.7)
IMM GRANULOCYTES # BLD AUTO: 0.01 10*3/MM3 (ref 0–0.05)
IMM GRANULOCYTES NFR BLD AUTO: 0.2 % (ref 0–0.5)
LYMPHOCYTES # BLD AUTO: 1.61 10*3/MM3 (ref 0.7–3.1)
LYMPHOCYTES NFR BLD AUTO: 25.5 % (ref 19.6–45.3)
MCH RBC QN AUTO: 29.7 PG (ref 26.6–33)
MCHC RBC AUTO-ENTMCNC: 32 G/DL (ref 31.5–35.7)
MCV RBC AUTO: 92.8 FL (ref 79–97)
MONOCYTES # BLD AUTO: 0.68 10*3/MM3 (ref 0.1–0.9)
MONOCYTES NFR BLD AUTO: 10.8 % (ref 5–12)
NEUTROPHILS NFR BLD AUTO: 3.71 10*3/MM3 (ref 1.7–7)
NEUTROPHILS NFR BLD AUTO: 58.8 % (ref 42.7–76)
PLATELET # BLD AUTO: 206 10*3/MM3 (ref 140–450)
PMV BLD AUTO: 10.7 FL (ref 6–12)
RBC # BLD AUTO: 4.31 10*6/MM3 (ref 4.14–5.8)
WBC NRBC COR # BLD AUTO: 6.31 10*3/MM3 (ref 3.4–10.8)

## 2024-02-07 PROCEDURE — 36415 COLL VENOUS BLD VENIPUNCTURE: CPT

## 2024-02-07 PROCEDURE — 85025 COMPLETE CBC W/AUTO DIFF WBC: CPT

## 2024-02-13 ENCOUNTER — OFFICE VISIT (OUTPATIENT)
Dept: INTERNAL MEDICINE | Facility: CLINIC | Age: 79
End: 2024-02-13
Payer: MEDICARE

## 2024-02-13 VITALS
OXYGEN SATURATION: 96 % | TEMPERATURE: 98.2 F | SYSTOLIC BLOOD PRESSURE: 104 MMHG | WEIGHT: 280 LBS | HEART RATE: 71 BPM | DIASTOLIC BLOOD PRESSURE: 60 MMHG | HEIGHT: 72 IN | BODY MASS INDEX: 37.93 KG/M2

## 2024-02-13 DIAGNOSIS — N40.1 BENIGN PROSTATIC HYPERPLASIA WITH LOWER URINARY TRACT SYMPTOMS, SYMPTOM DETAILS UNSPECIFIED: ICD-10-CM

## 2024-02-13 DIAGNOSIS — I10 ESSENTIAL HYPERTENSION: ICD-10-CM

## 2024-02-13 DIAGNOSIS — I25.10 CORONARY ARTERY DISEASE INVOLVING NATIVE CORONARY ARTERY OF NATIVE HEART WITHOUT ANGINA PECTORIS: Primary | ICD-10-CM

## 2024-02-13 DIAGNOSIS — R06.09 DYSPNEA ON EXERTION: ICD-10-CM

## 2024-02-13 DIAGNOSIS — E78.2 MIXED HYPERLIPIDEMIA: ICD-10-CM

## 2024-02-13 DIAGNOSIS — E11.9 TYPE 2 DIABETES MELLITUS TREATED WITHOUT INSULIN: ICD-10-CM

## 2024-02-13 DIAGNOSIS — R97.20 ELEVATED PSA: ICD-10-CM

## 2024-02-13 LAB
ALBUMIN SERPL-MCNC: 4 G/DL (ref 3.5–5.2)
ALBUMIN/GLOB SERPL: 1.4 G/DL
ALP SERPL-CCNC: 62 U/L (ref 39–117)
ALT SERPL W P-5'-P-CCNC: 13 U/L (ref 1–41)
ANION GAP SERPL CALCULATED.3IONS-SCNC: 11.1 MMOL/L (ref 5–15)
AST SERPL-CCNC: 16 U/L (ref 1–40)
BASOPHILS # BLD AUTO: 0.04 10*3/MM3 (ref 0–0.2)
BASOPHILS NFR BLD AUTO: 0.6 % (ref 0–1.5)
BILIRUB SERPL-MCNC: 0.5 MG/DL (ref 0–1.2)
BUN SERPL-MCNC: 26 MG/DL (ref 8–23)
BUN/CREAT SERPL: 18.7 (ref 7–25)
CALCIUM SPEC-SCNC: 9.6 MG/DL (ref 8.6–10.5)
CHLORIDE SERPL-SCNC: 101 MMOL/L (ref 98–107)
CO2 SERPL-SCNC: 25.9 MMOL/L (ref 22–29)
CREAT SERPL-MCNC: 1.39 MG/DL (ref 0.76–1.27)
DEPRECATED RDW RBC AUTO: 40.8 FL (ref 37–54)
EGFRCR SERPLBLD CKD-EPI 2021: 52.2 ML/MIN/1.73
EOSINOPHIL # BLD AUTO: 0.23 10*3/MM3 (ref 0–0.4)
EOSINOPHIL NFR BLD AUTO: 3.2 % (ref 0.3–6.2)
ERYTHROCYTE [DISTWIDTH] IN BLOOD BY AUTOMATED COUNT: 12.4 % (ref 12.3–15.4)
GLOBULIN UR ELPH-MCNC: 2.9 GM/DL
GLUCOSE SERPL-MCNC: 133 MG/DL (ref 65–99)
HBA1C MFR BLD: 7.8 % (ref 4.8–5.6)
HCT VFR BLD AUTO: 39.5 % (ref 37.5–51)
HGB BLD-MCNC: 13.2 G/DL (ref 13–17.7)
IMM GRANULOCYTES # BLD AUTO: 0.06 10*3/MM3 (ref 0–0.05)
IMM GRANULOCYTES NFR BLD AUTO: 0.8 % (ref 0–0.5)
LYMPHOCYTES # BLD AUTO: 2 10*3/MM3 (ref 0.7–3.1)
LYMPHOCYTES NFR BLD AUTO: 27.8 % (ref 19.6–45.3)
MCH RBC QN AUTO: 30.3 PG (ref 26.6–33)
MCHC RBC AUTO-ENTMCNC: 33.4 G/DL (ref 31.5–35.7)
MCV RBC AUTO: 90.6 FL (ref 79–97)
MONOCYTES # BLD AUTO: 0.91 10*3/MM3 (ref 0.1–0.9)
MONOCYTES NFR BLD AUTO: 12.7 % (ref 5–12)
NEUTROPHILS NFR BLD AUTO: 3.95 10*3/MM3 (ref 1.7–7)
NEUTROPHILS NFR BLD AUTO: 54.9 % (ref 42.7–76)
NRBC BLD AUTO-RTO: 0 /100 WBC (ref 0–0.2)
PLATELET # BLD AUTO: 225 10*3/MM3 (ref 140–450)
PMV BLD AUTO: 11.3 FL (ref 6–12)
POTASSIUM SERPL-SCNC: 4.7 MMOL/L (ref 3.5–5.2)
PROT SERPL-MCNC: 6.9 G/DL (ref 6–8.5)
RBC # BLD AUTO: 4.36 10*6/MM3 (ref 4.14–5.8)
SODIUM SERPL-SCNC: 138 MMOL/L (ref 136–145)
WBC NRBC COR # BLD AUTO: 7.19 10*3/MM3 (ref 3.4–10.8)

## 2024-02-13 PROCEDURE — 80053 COMPREHEN METABOLIC PANEL: CPT | Performed by: INTERNAL MEDICINE

## 2024-02-13 PROCEDURE — 85025 COMPLETE CBC W/AUTO DIFF WBC: CPT | Performed by: INTERNAL MEDICINE

## 2024-02-13 PROCEDURE — 83036 HEMOGLOBIN GLYCOSYLATED A1C: CPT | Performed by: INTERNAL MEDICINE

## 2024-02-13 RX ORDER — METFORMIN HYDROCHLORIDE 500 MG/1
500 TABLET, EXTENDED RELEASE ORAL
Qty: 90 TABLET | Refills: 3 | Status: SHIPPED | OUTPATIENT
Start: 2024-02-13 | End: 2024-05-13

## 2024-02-26 ENCOUNTER — OFFICE VISIT (OUTPATIENT)
Dept: UROLOGY | Facility: CLINIC | Age: 79
End: 2024-02-26
Payer: MEDICARE

## 2024-02-26 VITALS — BODY MASS INDEX: 36.29 KG/M2 | HEIGHT: 74 IN | WEIGHT: 282.8 LBS

## 2024-02-26 DIAGNOSIS — R31.0 GROSS HEMATURIA: ICD-10-CM

## 2024-02-26 DIAGNOSIS — N40.1 BENIGN PROSTATIC HYPERPLASIA WITH URINARY HESITANCY: Primary | ICD-10-CM

## 2024-02-26 DIAGNOSIS — R39.11 BENIGN PROSTATIC HYPERPLASIA WITH URINARY HESITANCY: Primary | ICD-10-CM

## 2024-02-26 DIAGNOSIS — R30.0 DYSURIA: ICD-10-CM

## 2024-02-26 LAB
BILIRUB BLD-MCNC: NEGATIVE MG/DL
CLARITY, POC: CLEAR
COLOR UR: YELLOW
EXPIRATION DATE: ABNORMAL
GLUCOSE UR STRIP-MCNC: NEGATIVE MG/DL
KETONES UR QL: NEGATIVE
LEUKOCYTE EST, POC: ABNORMAL
Lab: ABNORMAL
NITRITE UR-MCNC: NEGATIVE MG/ML
PH UR: 5.5 [PH] (ref 5–8)
PROT UR STRIP-MCNC: ABNORMAL MG/DL
RBC # UR STRIP: ABNORMAL /UL
SP GR UR: 1.03 (ref 1–1.03)
UROBILINOGEN UR QL: ABNORMAL

## 2024-02-26 PROCEDURE — 99214 OFFICE O/P EST MOD 30 MIN: CPT | Performed by: NURSE PRACTITIONER

## 2024-02-26 PROCEDURE — 1160F RVW MEDS BY RX/DR IN RCRD: CPT | Performed by: NURSE PRACTITIONER

## 2024-02-26 PROCEDURE — 1159F MED LIST DOCD IN RCRD: CPT | Performed by: NURSE PRACTITIONER

## 2024-02-26 PROCEDURE — 81003 URINALYSIS AUTO W/O SCOPE: CPT | Performed by: NURSE PRACTITIONER

## 2024-02-26 RX ORDER — CLOBETASOL PROPIONATE 0.5 MG/G
CREAM TOPICAL
COMMUNITY
Start: 2024-02-20

## 2024-02-26 NOTE — PROGRESS NOTES
"Chief Complaint: Benign Prostatic Hypertrophy (Pt is here today for a follow up. Pt is taking tamsulosin and reports no side effects or issues with this medication) and Blood in Urine (Pt states that around 2/14/24 he had some light pink blood in his urine with an \"initial sting\" when beginning to urinate. Pt states that the blood cleared up after a few days but he is still experiencing this stinging sensation when he starts to urinate. )    Subjective         History of Present Illness  Kennedy Lin is a 78 y.o. male presents to [facility] to be seen for f/u urinary frequency.    The patient states that around Valentine's Day he began with burning with urination and gross hematuria.  He saw his primary care provider on 2/13/2024 with this complaint however no urinalysis was performed at that date of service.  He was recommended having a CT scan however given his upcoming appointment was deferred.    PSA on 1/17/2024 is 1.84    He continues on tamsulosin 0.8 mg daily.    He reports that he is still having a stinging sensation on initiation of stream.    He states that he did not have any burning with the blood in the urine but the burning started after.    He states he cut out any  bladder irritating substances and the blood stopped.    Stream is \"steady when I have to go\"    States still with splitting and spraying.    Previous:    At last visit we started him on tamsulosn 0.8mg q day to see if this would help with his weak stream, urgency and split and spraying stream.     He states he is still with dribbling but somewhat better.    He states he has no dysuria.     Nocturia x 0    Stream is still weak.     He is still with urgency but this is better.    He states he is still with spraying and splitting stream.    He has to sit to void.    Patient was seen for Medicare wellness visit in April of this year.    Patient had labs ordered and subsequently had a PSA level that returned as " 6.331 prior PSA was 1.69.  His labs indicated he possibly had a urinary tract infection however urine culture was positive for greater than 100,000 colony-forming units per milliliter of E. coli which was resistant to ampicillin and ampicillin/sulbactam.  It appears that he was treated with Bactrim for 1 month.    He had PSA level repeated on 6/13/2023 which revealed a PSA of 1.43.    He is still with some urgency with urination.     He states the stream is weak at times.     He states a split and spraying stream.    He states some postvoid dribble.    He denies any straining.    He does state he has to think about trying to void.    Nocturia x 0-1.    He does state issues with erections.     He states remote smoking hx quit over 30 years ago smoked for 20 years 1 ppd.    He reports no Family HX of  malignancies.        Objective     Past Medical History:   Diagnosis Date    Allergic rhinitis     Asthma     Broken bones     Coccygeal fracture     Coronary artery disease     Diabetes mellitus, type 2     Elevated hemoglobin A1c     Encounter for Medicare annual wellness exam     History of kidney stones     Hyperlipidemia     Hypertension     Lumbar vertebral fracture     Malignant melanoma of skin     Obesity, Class I, BMI 30-34.9     Other emphysema 03/10/2023    Post-nasal drainage     Pulmonary arterial hypertension     Rash     Recurrent nephrolithiasis     Right rotator cuff tear     Tobacco chew use     Urinary tract infection     Viral infection     Vitamin D deficiency        Past Surgical History:   Procedure Laterality Date    CARDIAC CATHETERIZATION N/A 06/01/2022    Procedure: Left Heart Cath-Dr. Gill wants to do this to follow his device case;  Surgeon: ROBINSON Gill MD;  Location: [facility] INVASIVE LOCATION;  Service: Cardiology;  Laterality: N/A;    INGUINAL HERNIA REPAIR      ROTATOR CUFF REPAIR      SHOULDER SURGERY           Current Outpatient Medications:     albuterol sulfate  (90  Base) MCG/ACT inhaler, Inhale 2 puffs Every 6 (Six) Hours As Needed for Wheezing or Shortness of Air., Disp: 18 g, Rfl: 5    clobetasol propionate (TEMOVATE) 0.05 % cream, , Disp: , Rfl:     coenzyme Q10 100 MG capsule, Take 1 capsule by mouth Daily., Disp: , Rfl:     Fluticasone-Umeclidin-Vilant (Trelegy Ellipta) 200-62.5-25 MCG/ACT aerosol powder , Inhale 200 mcg Daily. Take 1 puff daily, Disp: 1 each, Rfl: 5    glipizide (GLUCOTROL XL) 10 MG 24 hr tablet, TAKE 1 TABLET BY MOUTH ONCE DAILY, Disp: 90 tablet, Rfl: 3    hydroCHLOROthiazide (HYDRODIURIL) 25 MG tablet, TAKE 1 TABLET BY MOUTH ONCE DAILY, Disp: 90 tablet, Rfl: 1    metFORMIN ER (GLUCOPHAGE-XR) 500 MG 24 hr tablet, Take 1 tablet by mouth Daily With Breakfast for 90 days., Disp: 90 tablet, Rfl: 3    ranolazine (RANEXA) 500 MG 12 hr tablet, Take 1 tablet BY MOUTH TWICE DAILY, Disp: 180 tablet, Rfl: 3    rosuvastatin (CRESTOR) 20 MG tablet, Take 1 tablet BY MOUTH EVERY NIGHT AT BEDTIME, Disp: 90 tablet, Rfl: 3    Spiriva HandiHaler 18 MCG per inhalation capsule, Place 1 capsule into inhaler and inhale Daily., Disp: 30 capsule, Rfl: 11    tamsulosin (FLOMAX) 0.4 MG capsule 24 hr capsule, Take 2 capsules by mouth Daily., Disp: 180 capsule, Rfl: 4    telmisartan (MICARDIS) 80 MG tablet, Take 1 tablet by mouth Daily., Disp: 90 tablet, Rfl: 3    Allergies   Allergen Reactions    Grass Unknown - Low Severity        Family History   Problem Relation Age of Onset    COPD Mother     Other Father         Brain tumor     Diabetes Maternal Grandmother     Hypertension Paternal Grandfather     Other Paternal Grandfather         acute myocardial infarction     Colon cancer Neg Hx     Prostate cancer Neg Hx     Thyroid disease Neg Hx        Social History     Socioeconomic History    Marital status:    Tobacco Use    Smoking status: Former     Packs/day: 1.50     Years: 15.00     Additional pack years: 0.00     Total pack years: 22.50     Types: Cigarettes     Start  "date: 1965     Quit date: 3/23/1986     Years since quittin.9     Passive exposure: Past    Smokeless tobacco: Current     Types: Chew   Vaping Use    Vaping Use: Never used   Substance and Sexual Activity    Alcohol use: Yes     Alcohol/week: 1.0 standard drink of alcohol     Types: 1 Cans of beer per week    Drug use: No    Sexual activity: Yes     Partners: Female       Vital Signs:   Ht 188 cm (74\")   Wt 128 kg (282 lb 12.8 oz)   BMI 36.31 kg/m²      Physical Exam     Result Review :   The following data was reviewed by: NURY Michaels on 2024:  Results for orders placed or performed in visit on 24   POC Urinalysis Dipstick, Automated    Specimen: Urine   Result Value Ref Range    Color Yellow Yellow, Straw, Dark Yellow, Shanti    Clarity, UA Clear Clear    Specific Gravity  1.030 1.005 - 1.030    pH, Urine 5.5 5.0 - 8.0    Leukocytes Trace (A) Negative    Nitrite, UA Negative Negative    Protein, POC Trace (A) Negative mg/dL    Glucose, UA Negative Negative mg/dL    Ketones, UA Negative Negative    Urobilinogen, UA 0.2 E.U./dL Normal, 0.2 E.U./dL    Bilirubin Negative Negative    Blood, UA Trace (A) Negative    Lot Number [id number]     Expiration Date 2,024/10       PSA          2023    13:42 2023    13:13 2024    10:24   PSA   PSA 6.330  1.430  1.840           Procedures        Assessment and Plan    Diagnoses and all orders for this visit:    1. Benign prostatic hyperplasia with urinary hesitancy (Primary)  -     POC Urinalysis Dipstick, Automated    2. Dysuria  -     POC Urinalysis Dipstick, Automated    3. Gross hematuria  -     CT Abdomen Pelvis With & Without Contrast; Future  -     Cystoscopy; Future  -     CMV Quant DNA PCR Urine - Urine, Clean Catch; Future        Given gross hematuria we will get him set up for upper and lower urinary tract workup.     We will order testing for uti. If negative may consider tx for prostatitis.     We will f/u after cysto to " determine POC moving forward. May place on finasteride pending w/u if any bleeding noted or hypervascularity at prostate.    I spent 10 minutes caring for Kennedy on this date of service. This time includes time spent by me in the following activities:reviewing tests, obtaining and/or reviewing a separately obtained history, performing a medically appropriate examination and/or evaluation , counseling and educating the patient/family/caregiver, ordering medications, tests, or procedures, and documenting information in the medical record  Follow Up   Return for With Dr. Monteiro for Cystoscopy for gross hematuria .  Patient was given instructions and counseling regarding his condition or for health maintenance advice. Please see specific information pulled into the AVS if appropriate.         This document has been electronically signed by NURY Michaels  February 26, 2024 11:07 EST

## 2024-02-28 ENCOUNTER — TELEPHONE (OUTPATIENT)
Dept: UROLOGY | Facility: CLINIC | Age: 79
End: 2024-02-28
Payer: MEDICARE

## 2024-02-28 RX ORDER — AMPICILLIN 500 MG/1
500 CAPSULE ORAL 4 TIMES DAILY
Qty: 40 CAPSULE | Refills: 0 | Status: SHIPPED | OUTPATIENT
Start: 2024-02-28 | End: 2024-03-09

## 2024-02-28 NOTE — TELEPHONE ENCOUNTER
----- Message from NURY Michaels sent at 2/28/2024  8:49 AM EST -----  Please inform patient his urine culture did return positive [facility].

## 2024-03-04 ENCOUNTER — HOSPITAL ENCOUNTER (OUTPATIENT)
Dept: CT IMAGING | Facility: HOSPITAL | Age: 79
Discharge: HOME | End: 2024-03-04
Admitting: NURSE PRACTITIONER
Payer: MEDICARE

## 2024-03-04 DIAGNOSIS — R31.0 GROSS HEMATURIA: ICD-10-CM

## 2024-03-04 PROCEDURE — 74178 CT ABD&PLV WO CNTR FLWD CNTR: CPT

## 2024-03-04 PROCEDURE — 25510000001 IOPAMIDOL PER 1 ML: Performed by: NURSE PRACTITIONER

## 2024-03-04 RX ADMIN — IOPAMIDOL 100 ML: 755 INJECTION, SOLUTION INTRAVENOUS at 12:50

## 2024-03-05 DIAGNOSIS — N41.9 PROSTATITIS, UNSPECIFIED PROSTATITIS TYPE: Primary | ICD-10-CM

## 2024-03-05 RX ORDER — LACTOBACILLUS ACIDOPHILUS 20B CELL
1 CAPSULE ORAL DAILY
Qty: 28 CAPSULE | Refills: 0 | Status: SHIPPED | OUTPATIENT
Start: 2024-03-05

## 2024-03-05 RX ORDER — DOXYCYCLINE HYCLATE 100 MG/1
100 CAPSULE ORAL 2 TIMES DAILY
Qty: 90 CAPSULE | Refills: 0 | Status: SHIPPED | OUTPATIENT
Start: 2024-03-05 | End: 2024-04-19

## 2024-04-12 DIAGNOSIS — J44.1 COPD WITH ACUTE EXACERBATION: ICD-10-CM

## 2024-04-12 DIAGNOSIS — E66.01 MORBID (SEVERE) OBESITY DUE TO EXCESS CALORIES: ICD-10-CM

## 2024-04-12 DIAGNOSIS — J43.2 CENTRILOBULAR EMPHYSEMA: ICD-10-CM

## 2024-04-12 DIAGNOSIS — J43.8 OTHER EMPHYSEMA: Primary | ICD-10-CM

## 2024-04-12 DIAGNOSIS — Z12.2 ENCOUNTER FOR SCREENING FOR LUNG CANCER: ICD-10-CM

## 2024-04-12 DIAGNOSIS — R06.09 DYSPNEA ON EXERTION: ICD-10-CM

## 2024-04-12 DIAGNOSIS — F17.201 TOBACCO ABUSE, IN REMISSION: ICD-10-CM

## 2024-04-16 ENCOUNTER — OFFICE VISIT (OUTPATIENT)
Dept: CARDIOLOGY | Facility: CLINIC | Age: 79
End: 2024-04-16
Payer: MEDICARE

## 2024-04-16 VITALS
BODY MASS INDEX: 36.83 KG/M2 | WEIGHT: 287 LBS | HEIGHT: 74 IN | DIASTOLIC BLOOD PRESSURE: 69 MMHG | HEART RATE: 73 BPM | SYSTOLIC BLOOD PRESSURE: 138 MMHG

## 2024-04-16 DIAGNOSIS — E78.2 HYPERLIPEMIA, MIXED: ICD-10-CM

## 2024-04-16 DIAGNOSIS — I25.10 CORONARY ARTERY DISEASE INVOLVING NATIVE CORONARY ARTERY OF NATIVE HEART WITHOUT ANGINA PECTORIS: ICD-10-CM

## 2024-04-16 DIAGNOSIS — E66.01 MORBID (SEVERE) OBESITY DUE TO EXCESS CALORIES: ICD-10-CM

## 2024-04-16 DIAGNOSIS — I10 ESSENTIAL HYPERTENSION: Primary | ICD-10-CM

## 2024-04-16 DIAGNOSIS — I10 HYPERTENSION, ESSENTIAL: ICD-10-CM

## 2024-04-16 RX ORDER — HYDROCHLOROTHIAZIDE 25 MG/1
25 TABLET ORAL DAILY
Qty: 90 TABLET | Refills: 3 | Status: SHIPPED | OUTPATIENT
Start: 2024-04-16

## 2024-04-16 RX ORDER — ASPIRIN 81 MG/1
81 TABLET, CHEWABLE ORAL DAILY
COMMUNITY

## 2024-04-18 ENCOUNTER — PROCEDURE VISIT (OUTPATIENT)
Dept: UROLOGY | Facility: CLINIC | Age: 79
End: 2024-04-18
Payer: MEDICARE

## 2024-04-18 VITALS
HEART RATE: 68 BPM | WEIGHT: 286.4 LBS | HEIGHT: 74 IN | SYSTOLIC BLOOD PRESSURE: 150 MMHG | DIASTOLIC BLOOD PRESSURE: 75 MMHG | BODY MASS INDEX: 36.76 KG/M2

## 2024-04-18 DIAGNOSIS — R31.0 GROSS HEMATURIA: Primary | ICD-10-CM

## 2024-04-18 DIAGNOSIS — R39.11 BENIGN PROSTATIC HYPERPLASIA WITH URINARY HESITANCY: ICD-10-CM

## 2024-04-18 DIAGNOSIS — N35.914 STRICTURE OF ANTERIOR URETHRA IN MALE, UNSPECIFIED STRICTURE TYPE: ICD-10-CM

## 2024-04-18 DIAGNOSIS — N40.1 BENIGN PROSTATIC HYPERPLASIA WITH URINARY HESITANCY: ICD-10-CM

## 2024-04-18 NOTE — PROGRESS NOTES
Preprocedure diagnosis  Hematuria    Postprocedure diagnosis  Urethral stricture, hematuria    Procedure  Flexible Cystourethroscopy    Attending surgeon  Radha Monteiro MD    Anesthesia  2% lidocaine jelly intraurethrally    Complications  None    Indications  78 y.o. male undergoing a flexible cystoscopy for the above mentioned indications.  Patient of urology SHANTAGrazyna history of gross hematuria, BPH who presents for lower urinary tract evaluation.    CT urogram performed prior to today's visit and is without any stones or hydronephrosis; stable multiple bilateral renal cysts; no evidence of enhancing renal mass, mild bladder wall thickening.    Informed consent was obtained.      Findings  Hidden penis with skin obscuring the glans; able to access the meatus without difficulty; cystoscope inserted, at approximately 5 cm, urethral stricture approximately 8 Yoruba appreciated; unable to advance cystoscope further    Procedure  The patient was placed in supine position and prepped and draped in sterile fashion with lidocaine jelly per urethra for anesthesia.  A timeout was performed.  The 14F flexible cystoscope was lubricated and gently placed at the meatus without significant difficulty.  The pendulous urethral stricture was noted approximately 8 Yoruba in size, unable to advance scope further.  Other findings were as above. The scope was withdrawn and the procedure terminated.  The patient tolerated the procedure well.        Plan:  Tolerated procedure well.  Instructions provided.  Cystoscopic findings discussed with patient include urethral stricture; unable to evaluate remainder of urethra, prostate, or bladder.    Discussed at length that this is an incomplete evaluation for hematuria.  Unable to rule out underlying malignancy or possible etiology for his hematuria.  Did discuss that etiology may be the stricture however warrants complete evaluation.    Discussed proceeding to the operating room  to address his stricture as well as perform complete cystoscopic evaluation.    Discussed management of stricture via direct visual internal urethrotomy and cystoscopy.  Risks including but not limited to bleeding, infection, damage to surrounding structures, recurrence of stricture, pain, and risks of anesthesia including up to death.  Postoperative course also discussed with patient including the need for postoperative catheterization to ensure adequate healing of the urethra.  Discussed that typically a urethral catheter remains in place for 7 to 10 days postop.  Patient participated in the discussion, asked appropriate questions.    Primary concern was regarding the catheter.  Did discuss other options including urethral dilation which would not require a catheter and believe good complete hematuria evaluation but has a much higher risk of recurrence of stricture then performing the recommended procedure, DVIU as above.      Patient agreeable to DVIU, cystoscopic evaluation however, wants to wait until December if possible given other life occurrences.      Plan to continue to monitor, assess emptying until he can get on the surgery schedule.    Should patient have acute issues, recurrence of hematuria, would recommend proceeding to the OR earlier.  He notes understanding of this.      Patient to follow-up, symptom check, PVR with urology SHANTA Santana, in a couple months.    Schedule OR at patient's request around December timeframe  All questions addressed        MDM moderate: 1 undiagnosed new problem of uncertain prognosis; discussion regarding surgery including patient or procedure identified risk factors

## 2024-04-19 ENCOUNTER — HOSPITAL ENCOUNTER (OUTPATIENT)
Dept: CT IMAGING | Facility: HOSPITAL | Age: 79
Discharge: HOME | End: 2024-04-19
Payer: MEDICARE

## 2024-04-19 DIAGNOSIS — E66.01 MORBID (SEVERE) OBESITY DUE TO EXCESS CALORIES: ICD-10-CM

## 2024-04-19 DIAGNOSIS — J43.2 CENTRILOBULAR EMPHYSEMA: ICD-10-CM

## 2024-04-19 DIAGNOSIS — F17.201 TOBACCO ABUSE, IN REMISSION: ICD-10-CM

## 2024-04-19 DIAGNOSIS — N41.9 PROSTATITIS, UNSPECIFIED PROSTATITIS TYPE: ICD-10-CM

## 2024-04-19 DIAGNOSIS — Z12.2 ENCOUNTER FOR SCREENING FOR LUNG CANCER: ICD-10-CM

## 2024-04-19 DIAGNOSIS — J44.1 COPD WITH ACUTE EXACERBATION: ICD-10-CM

## 2024-04-19 DIAGNOSIS — R06.09 DYSPNEA ON EXERTION: ICD-10-CM

## 2024-04-19 DIAGNOSIS — J43.8 OTHER EMPHYSEMA: ICD-10-CM

## 2024-04-19 PROCEDURE — 71271 CT THORAX LUNG CANCER SCR C-: CPT

## 2024-04-19 RX ORDER — DOXYCYCLINE HYCLATE 100 MG/1
100 CAPSULE ORAL 2 TIMES DAILY
Qty: 90 CAPSULE | Refills: 0 | OUTPATIENT
Start: 2024-04-19

## 2024-04-23 ENCOUNTER — OFFICE VISIT (OUTPATIENT)
Dept: PULMONOLOGY | Facility: CLINIC | Age: 79
End: 2024-04-23
Payer: MEDICARE

## 2024-04-23 VITALS
SYSTOLIC BLOOD PRESSURE: 122 MMHG | RESPIRATION RATE: 18 BRPM | OXYGEN SATURATION: 93 % | TEMPERATURE: 98.2 F | DIASTOLIC BLOOD PRESSURE: 71 MMHG | HEART RATE: 79 BPM | BODY MASS INDEX: 36.37 KG/M2 | HEIGHT: 74 IN | WEIGHT: 283.4 LBS

## 2024-04-23 DIAGNOSIS — F17.201 TOBACCO ABUSE, IN REMISSION: ICD-10-CM

## 2024-04-23 DIAGNOSIS — J43.2 CENTRILOBULAR EMPHYSEMA: Primary | ICD-10-CM

## 2024-04-23 DIAGNOSIS — J43.2 CENTRILOBULAR EMPHYSEMA: ICD-10-CM

## 2024-04-23 DIAGNOSIS — R06.09 DYSPNEA ON EXERTION: ICD-10-CM

## 2024-04-23 PROCEDURE — 3074F SYST BP LT 130 MM HG: CPT | Performed by: INTERNAL MEDICINE

## 2024-04-23 PROCEDURE — 1160F RVW MEDS BY RX/DR IN RCRD: CPT | Performed by: INTERNAL MEDICINE

## 2024-04-23 PROCEDURE — 99213 OFFICE O/P EST LOW 20 MIN: CPT | Performed by: INTERNAL MEDICINE

## 2024-04-23 PROCEDURE — 3078F DIAST BP <80 MM HG: CPT | Performed by: INTERNAL MEDICINE

## 2024-04-23 PROCEDURE — 1159F MED LIST DOCD IN RCRD: CPT | Performed by: INTERNAL MEDICINE

## 2024-04-23 RX ORDER — FLUTICASONE FUROATE, UMECLIDINIUM BROMIDE AND VILANTEROL TRIFENATATE 200; 62.5; 25 UG/1; UG/1; UG/1
1 POWDER RESPIRATORY (INHALATION) DAILY
Qty: 3 EACH | Refills: 5 | Status: SHIPPED | OUTPATIENT
Start: 2024-04-23

## 2024-04-23 RX ORDER — ALBUTEROL SULFATE 90 UG/1
2 AEROSOL, METERED RESPIRATORY (INHALATION) EVERY 6 HOURS PRN
Qty: 18 G | Refills: 5 | Status: SHIPPED | OUTPATIENT
Start: 2024-04-23

## 2024-04-23 NOTE — PROGRESS NOTES
Pulmonary Office Follow-up    Subjective     Kennedy Lin is seen today at the office for   Chief Complaint   Patient presents with    Follow-up     3 month    Emphysema    Shortness of Breath         HPI  Kennedy Lin is a 78 y.o. male with a PMH significant for COPD and tobacco abuse presents for follow-up patient has been taking his Trelegy along with albuterol inhaler as needed and continues to have shortness of breath on exertion patient is gradually increasing his walking distance he denies any significant expectoration at this time and has already quit smoking      Tobacco use history:  Former smoker      Patient Active Problem List   Diagnosis    Essential hypertension    Type 2 diabetes mellitus treated without insulin    Smokeless tobacco use    Coronary artery disease (moderate per cath but not stented)     High cholesterol    Other emphysema    Other allergic rhinitis    Colonoscopy declined (Cologuard ordered in 2023)     BPH with obstruction/lower urinary tract symptoms    Mixed hyperlipidemia    Benign prostatic hyperplasia with lower urinary tract symptoms    Annual physical exam    Screening PSA (prostate specific antigen)    Elevated PSA    Vitamin D deficiency    Skin lesion of back    Dyspnea on exertion    Leukocytosis       Review of Systems  Review of Systems   Respiratory:  Positive for shortness of breath.    All other systems reviewed and are negative.    As described in the HPI. Otherwise, remainder of ROS (14 systems) were negative.    Medications, Allergies, Social, and Family Histories reviewed as per EMR.    Result Review :            Objective     Vitals:    04/23/24 1430   BP: 122/71   Pulse: 79   Resp: 18   Temp: 98.2 °F (36.8 °C)   SpO2: 93%         04/23/24  1430   Weight: 129 kg (283 lb 6.4 oz)       Physical Exam  Vitals and nursing note reviewed.   Constitutional:       Appearance: Normal appearance.   HENT:      Head: Normocephalic and atraumatic.      Nose: Nose  normal.      Mouth/Throat:      Mouth: Mucous membranes are moist.      Pharynx: Oropharynx is clear.   Eyes:      Extraocular Movements: Extraocular movements intact.      Conjunctiva/sclera: Conjunctivae normal.      Pupils: Pupils are equal, round, and reactive to light.   Cardiovascular:      Rate and Rhythm: Normal rate and regular rhythm.      Pulses: Normal pulses.      Heart sounds: Normal heart sounds.   Pulmonary:      Effort: Pulmonary effort is normal.      Breath sounds: Wheezing and rhonchi present.   Abdominal:      General: Abdomen is flat. Bowel sounds are normal.      Palpations: Abdomen is soft.   Musculoskeletal:         General: Normal range of motion.      Cervical back: Normal range of motion and neck supple.   Skin:     General: Skin is warm.      Capillary Refill: Capillary refill takes 2 to 3 seconds.   Neurological:      General: No focal deficit present.      Mental Status: He is alert and oriented to person, place, and time.   Psychiatric:         Mood and Affect: Mood normal.         Behavior: Behavior normal.         CT Chest Low Dose Cancer Screening WO    Result Date: 4/19/2024  1. Lung-RADS Category 1 - Negative. 2. Recommend continued screening with low dose lung CT scan in 12 months. 3. Mild emphysema. No active airspace process. 4. Aortic and severe calcified coronary atherosclerotic disease.    Electronically Signed By-Ramón Corley MD On:4/19/2024 3:17 PM      CT Abdomen Pelvis With & Without Contrast    Result Date: 3/4/2024     1. No urolithiasis or evidence of solid renal mass  2. Mild bladder wall thickening and haziness of the perivesical fat suggests possible cystitis.  In addition, haziness of the fat around the prostate gland is suggestive of prostatitis  3. Renal and hepatic cysts  4. Colonic diverticulosis  5. 2.9 cm ectasia of the abdominal aorta     KARINA LONGO MD       Electronically Signed and Approved By: KARINA LONGO MD on 3/04/2024 at 15:22                Assessment & Plan     Diagnoses and all orders for this visit:    1. Centrilobular emphysema (Primary)    2. Tobacco abuse, in remission         Discussion/ Recommendations:   CT scan reviewed shows evidence of emphysema no lung nodules noted  Advised to continue Trelegy  Albuterol inhaler as needed  Regular exercise  Weight reduction his BMI is 36.39  Vaccinations discussed and recommended             Return in about 3 months (around 7/23/2024).          This document has been electronically signed by Moe Ko MD on April 23, 2024 14:48 EDT

## 2024-05-20 ENCOUNTER — TELEPHONE (OUTPATIENT)
Dept: CARDIOLOGY | Facility: CLINIC | Age: 79
End: 2024-05-20
Payer: MEDICARE

## 2024-05-20 RX ORDER — RANOLAZINE 500 MG/1
500 TABLET, EXTENDED RELEASE ORAL 2 TIMES DAILY
Qty: 180 TABLET | Refills: 3 | Status: SHIPPED | OUTPATIENT
Start: 2024-05-20

## 2024-05-20 NOTE — TELEPHONE ENCOUNTER
[facility] received a fax that requires your attention. The document has been indexed to the patient’s chart for your review.      Reason for sending: EXTERNAL MEDICAL RECORD NOTIFICATION     Documents Description: MEDS-RANOLAZINE REFILL-5.20.24    Name of Sender: [facility]     Date Indexed: 5.20.24

## 2024-06-11 ENCOUNTER — OFFICE VISIT (OUTPATIENT)
Dept: INTERNAL MEDICINE | Age: 79
End: 2024-06-11
Payer: MEDICARE

## 2024-06-11 VITALS
OXYGEN SATURATION: 94 % | BODY MASS INDEX: 36.45 KG/M2 | HEART RATE: 70 BPM | TEMPERATURE: 97.7 F | HEIGHT: 74 IN | SYSTOLIC BLOOD PRESSURE: 112 MMHG | WEIGHT: 284 LBS | DIASTOLIC BLOOD PRESSURE: 60 MMHG

## 2024-06-11 DIAGNOSIS — R06.09 DYSPNEA ON EXERTION: ICD-10-CM

## 2024-06-11 DIAGNOSIS — E55.9 VITAMIN D DEFICIENCY: ICD-10-CM

## 2024-06-11 DIAGNOSIS — Z00.00 MEDICARE ANNUAL WELLNESS VISIT, SUBSEQUENT: Primary | ICD-10-CM

## 2024-06-11 DIAGNOSIS — N40.1 BPH WITH OBSTRUCTION/LOWER URINARY TRACT SYMPTOMS: ICD-10-CM

## 2024-06-11 DIAGNOSIS — J30.89 OTHER ALLERGIC RHINITIS: ICD-10-CM

## 2024-06-11 DIAGNOSIS — N40.1 BENIGN PROSTATIC HYPERPLASIA WITH URINARY FREQUENCY: ICD-10-CM

## 2024-06-11 DIAGNOSIS — E11.9 TYPE 2 DIABETES MELLITUS TREATED WITHOUT INSULIN: ICD-10-CM

## 2024-06-11 DIAGNOSIS — I25.10 CORONARY ARTERY DISEASE INVOLVING NATIVE CORONARY ARTERY OF NATIVE HEART WITHOUT ANGINA PECTORIS: ICD-10-CM

## 2024-06-11 DIAGNOSIS — E78.2 MIXED HYPERLIPIDEMIA: ICD-10-CM

## 2024-06-11 DIAGNOSIS — N13.8 BPH WITH OBSTRUCTION/LOWER URINARY TRACT SYMPTOMS: ICD-10-CM

## 2024-06-11 DIAGNOSIS — I10 ESSENTIAL HYPERTENSION: ICD-10-CM

## 2024-06-11 DIAGNOSIS — R35.0 BENIGN PROSTATIC HYPERPLASIA WITH URINARY FREQUENCY: ICD-10-CM

## 2024-06-11 DIAGNOSIS — Z12.5 SCREENING PSA (PROSTATE SPECIFIC ANTIGEN): ICD-10-CM

## 2024-06-11 LAB
ALBUMIN SERPL-MCNC: 4.2 G/DL (ref 3.5–5.2)
ALBUMIN UR-MCNC: 1.2 MG/DL
ALBUMIN/GLOB SERPL: 1.6 G/DL
ALP SERPL-CCNC: 69 U/L (ref 39–117)
ALT SERPL W P-5'-P-CCNC: 14 U/L (ref 1–41)
ANION GAP SERPL CALCULATED.3IONS-SCNC: 12.4 MMOL/L (ref 5–15)
AST SERPL-CCNC: 12 U/L (ref 1–40)
BASOPHILS # BLD AUTO: 0.03 10*3/MM3 (ref 0–0.2)
BASOPHILS NFR BLD AUTO: 0.5 % (ref 0–1.5)
BILIRUB SERPL-MCNC: 0.5 MG/DL (ref 0–1.2)
BUN SERPL-MCNC: 19 MG/DL (ref 8–23)
BUN/CREAT SERPL: 14.4 (ref 7–25)
CALCIUM SPEC-SCNC: 9.7 MG/DL (ref 8.6–10.5)
CHLORIDE SERPL-SCNC: 102 MMOL/L (ref 98–107)
CHOLEST SERPL-MCNC: 89 MG/DL (ref 0–200)
CO2 SERPL-SCNC: 24.6 MMOL/L (ref 22–29)
CREAT SERPL-MCNC: 1.32 MG/DL (ref 0.76–1.27)
DEPRECATED RDW RBC AUTO: 40.1 FL (ref 37–54)
EGFRCR SERPLBLD CKD-EPI 2021: 55.6 ML/MIN/1.73
EOSINOPHIL # BLD AUTO: 0.16 10*3/MM3 (ref 0–0.4)
EOSINOPHIL NFR BLD AUTO: 2.5 % (ref 0.3–6.2)
ERYTHROCYTE [DISTWIDTH] IN BLOOD BY AUTOMATED COUNT: 12.3 % (ref 12.3–15.4)
GLOBULIN UR ELPH-MCNC: 2.6 GM/DL
GLUCOSE SERPL-MCNC: 168 MG/DL (ref 65–99)
HBA1C MFR BLD: 6.6 % (ref 4.8–5.6)
HCT VFR BLD AUTO: 40.6 % (ref 37.5–51)
HDLC SERPL-MCNC: 30 MG/DL (ref 40–60)
HGB BLD-MCNC: 13.6 G/DL (ref 13–17.7)
IMM GRANULOCYTES # BLD AUTO: 0.03 10*3/MM3 (ref 0–0.05)
IMM GRANULOCYTES NFR BLD AUTO: 0.5 % (ref 0–0.5)
LDLC SERPL CALC-MCNC: 33 MG/DL (ref 0–100)
LDLC/HDLC SERPL: 0.94 {RATIO}
LYMPHOCYTES # BLD AUTO: 1.36 10*3/MM3 (ref 0.7–3.1)
LYMPHOCYTES NFR BLD AUTO: 21.6 % (ref 19.6–45.3)
MCH RBC QN AUTO: 30.6 PG (ref 26.6–33)
MCHC RBC AUTO-ENTMCNC: 33.5 G/DL (ref 31.5–35.7)
MCV RBC AUTO: 91.2 FL (ref 79–97)
MONOCYTES # BLD AUTO: 0.55 10*3/MM3 (ref 0.1–0.9)
MONOCYTES NFR BLD AUTO: 8.7 % (ref 5–12)
NEUTROPHILS NFR BLD AUTO: 4.18 10*3/MM3 (ref 1.7–7)
NEUTROPHILS NFR BLD AUTO: 66.2 % (ref 42.7–76)
NRBC BLD AUTO-RTO: 0 /100 WBC (ref 0–0.2)
PLATELET # BLD AUTO: 168 10*3/MM3 (ref 140–450)
PMV BLD AUTO: 11.6 FL (ref 6–12)
POTASSIUM SERPL-SCNC: 4.4 MMOL/L (ref 3.5–5.2)
PROT SERPL-MCNC: 6.8 G/DL (ref 6–8.5)
RBC # BLD AUTO: 4.45 10*6/MM3 (ref 4.14–5.8)
SODIUM SERPL-SCNC: 139 MMOL/L (ref 136–145)
T4 FREE SERPL-MCNC: 1.11 NG/DL (ref 0.92–1.68)
TRIGL SERPL-MCNC: 154 MG/DL (ref 0–150)
TSH SERPL DL<=0.05 MIU/L-ACNC: 4.2 UIU/ML (ref 0.27–4.2)
VLDLC SERPL-MCNC: 26 MG/DL (ref 5–40)
WBC NRBC COR # BLD AUTO: 6.31 10*3/MM3 (ref 3.4–10.8)

## 2024-06-11 PROCEDURE — 80053 COMPREHEN METABOLIC PANEL: CPT | Performed by: INTERNAL MEDICINE

## 2024-06-11 PROCEDURE — 82043 UR ALBUMIN QUANTITATIVE: CPT | Performed by: INTERNAL MEDICINE

## 2024-06-11 PROCEDURE — 83036 HEMOGLOBIN GLYCOSYLATED A1C: CPT | Performed by: INTERNAL MEDICINE

## 2024-06-11 PROCEDURE — 84439 ASSAY OF FREE THYROXINE: CPT | Performed by: INTERNAL MEDICINE

## 2024-06-11 PROCEDURE — 85025 COMPLETE CBC W/AUTO DIFF WBC: CPT | Performed by: INTERNAL MEDICINE

## 2024-06-11 PROCEDURE — 84443 ASSAY THYROID STIM HORMONE: CPT | Performed by: INTERNAL MEDICINE

## 2024-06-11 PROCEDURE — 80061 LIPID PANEL: CPT | Performed by: INTERNAL MEDICINE

## 2024-06-11 NOTE — PROGRESS NOTES
"CHIEF COMPLAINT/ HPI:  Hyperlipidemia (Routine follow up. Pt states no concerns at this time. )    Here for routine follow-up also do his annual wellness questionnaire, says he is doing okay still sees cardiology,          Objective   Vital Signs  Vitals:    06/11/24 1048   BP: 112/60   Pulse: 70   Temp: 97.7 °F (36.5 °C)   SpO2: 94%   Weight: 129 kg (284 lb)   Height: 188 cm (74.02\")      Body mass index is 36.45 kg/m².  Review of Systems   Constitutional: Negative.    HENT: Negative.     Eyes: Negative.    Respiratory: Negative.     Cardiovascular: Negative.    Gastrointestinal: Negative.    Endocrine: Negative.    Genitourinary: Negative.    Musculoskeletal: Negative.    Allergic/Immunologic: Negative.    Neurological: Negative.    Hematological: Negative.    Psychiatric/Behavioral: Negative.        Physical Exam  Constitutional:       General: He is not in acute distress.     Appearance: Normal appearance. He is obese.   HENT:      Head: Normocephalic.      Mouth/Throat:      Mouth: Mucous membranes are moist.   Eyes:      Conjunctiva/sclera: Conjunctivae normal.      Pupils: Pupils are equal, round, and reactive to light.   Cardiovascular:      Rate and Rhythm: Normal rate and regular rhythm.      Pulses: Normal pulses.      Heart sounds: Normal heart sounds.   Pulmonary:      Effort: Pulmonary effort is normal.      Breath sounds: Normal breath sounds.   Abdominal:      General: Bowel sounds are normal.      Palpations: Abdomen is soft.   Musculoskeletal:         General: No swelling. Normal range of motion.      Cervical back: Neck supple.   Skin:     General: Skin is warm and dry.      Coloration: Skin is not jaundiced.   Neurological:      General: No focal deficit present.      Mental Status: He is alert and oriented to person, place, and time. Mental status is at baseline.   Psychiatric:         Mood and Affect: Mood normal.         Behavior: Behavior normal.         Thought Content: Thought content " normal.         Judgment: Judgment normal.        Result Review :   Lab Results   Component Value Date    PROBNP 405.0 01/17/2024     CMP          1/17/2024    10:24 2/13/2024    10:21   CMP   Glucose 180  133    BUN 23  26    Creatinine 1.34  1.39    EGFR 54.6  52.2    Sodium 139  138    Potassium 4.2  4.7    Chloride 96  101    Calcium 9.5  9.6    Total Protein 7.0  6.9    Albumin 4.2  4.0    Globulin 2.8  2.9    Total Bilirubin 0.5  0.5    Alkaline Phosphatase 56  62    AST (SGOT) 15  16    ALT (SGPT) 13  13    Albumin/Globulin Ratio 1.5  1.4    BUN/Creatinine Ratio 17.2  18.7    Anion Gap 17.3  11.1      CBC w/diff          1/17/2024    10:24 2/7/2024    11:24 2/13/2024    10:21   CBC w/Diff   WBC 13.99  6.31  7.19    RBC 4.39  4.31  4.36    Hemoglobin 13.4  12.8  13.2    Hematocrit 39.8  40.0  39.5    MCV 90.7  92.8  90.6    MCH 30.5  29.7  30.3    MCHC 33.7  32.0  33.4    RDW 12.3  13.0  12.4    Platelets 215  206  225    Neutrophil Rel % 81.8  58.8  54.9    Immature Granulocyte Rel % 0.5  0.2  0.8    Lymphocyte Rel % 10.8  25.5  27.8    Monocyte Rel % 6.7  10.8  12.7    Eosinophil Rel % 0.1  4.4  3.2    Basophil Rel % 0.1  0.3  0.6       Lipid Panel          1/17/2024    10:24   Lipid Panel   Total Cholesterol 101    Triglycerides 146    HDL Cholesterol 24    VLDL Cholesterol 26    LDL Cholesterol  51    LDL/HDL Ratio 1.99       Lab Results   Component Value Date    TSH 1.220 01/17/2024    TSH 1.750 04/28/2023    TSH 3.690 11/11/2021      Lab Results   Component Value Date    FREET4 1.09 01/17/2024      A1C Last 3 Results          1/17/2024    10:24 2/13/2024    10:21   HGBA1C Last 3 Results   Hemoglobin A1C 7.40  7.80       PSA          1/17/2024    10:24   PSA   PSA 1.840                     Visit Diagnoses:    ICD-10-CM ICD-9-CM   1. Medicare annual wellness visit, subsequent  Z00.00 V70.0   2. Dyspnea on exertion  R06.09 786.09   3. Coronary artery disease (moderate per cath but not stented)   I25.10  414.01   4. Essential hypertension  I10 401.9   5. Mixed hyperlipidemia  E78.2 272.2   6. Vitamin D deficiency  E55.9 268.9   7. Benign prostatic hyperplasia with urinary frequency  N40.1 600.01    R35.0 788.41   8. Screening PSA (prostate specific antigen)  Z12.5 V76.44   9. BPH with obstruction/lower urinary tract symptoms  N40.1 600.01    N13.8 599.69   10. Type 2 diabetes mellitus treated without insulin  E11.9 250.00   11. Other allergic rhinitis  J30.89 477.8       Assessment and Plan   Diagnoses and all orders for this visit:    1. Medicare annual wellness visit, subsequent (Primary)  -     CBC & Differential; Future  -     Comprehensive Metabolic Panel; Future  -     Lipid Panel; Future  -     Hemoglobin A1c; Future  -     CBC & Differential; Future  -     Comprehensive Metabolic Panel; Future  -     Lipid Panel; Future  -     Hemoglobin A1c; Future  -     TSH+Free T4; Future  -     MicroAlbumin, Urine, Random - Urine, Clean Catch; Future    2. Dyspnea on exertion  -     CBC & Differential; Future  -     Comprehensive Metabolic Panel; Future  -     Lipid Panel; Future  -     Hemoglobin A1c; Future  -     CBC & Differential; Future  -     Comprehensive Metabolic Panel; Future  -     Lipid Panel; Future  -     Hemoglobin A1c; Future  -     TSH+Free T4; Future  -     MicroAlbumin, Urine, Random - Urine, Clean Catch; Future    3. Coronary artery disease (moderate per cath but not stented)   -     CBC & Differential; Future  -     Comprehensive Metabolic Panel; Future  -     Lipid Panel; Future  -     Hemoglobin A1c; Future  -     CBC & Differential; Future  -     Comprehensive Metabolic Panel; Future  -     Lipid Panel; Future  -     Hemoglobin A1c; Future  -     TSH+Free T4; Future  -     MicroAlbumin, Urine, Random - Urine, Clean Catch; Future    4. Essential hypertension  -     CBC & Differential; Future  -     Comprehensive Metabolic Panel; Future  -     Lipid Panel; Future  -     Hemoglobin A1c; Future  -      CBC & Differential; Future  -     Comprehensive Metabolic Panel; Future  -     Lipid Panel; Future  -     Hemoglobin A1c; Future  -     TSH+Free T4; Future  -     MicroAlbumin, Urine, Random - Urine, Clean Catch; Future    5. Mixed hyperlipidemia  -     CBC & Differential; Future  -     Comprehensive Metabolic Panel; Future  -     Lipid Panel; Future  -     Hemoglobin A1c; Future  -     CBC & Differential; Future  -     Comprehensive Metabolic Panel; Future  -     Lipid Panel; Future  -     Hemoglobin A1c; Future  -     TSH+Free T4; Future  -     MicroAlbumin, Urine, Random - Urine, Clean Catch; Future    6. Vitamin D deficiency  -     CBC & Differential; Future  -     Comprehensive Metabolic Panel; Future  -     Lipid Panel; Future  -     Hemoglobin A1c; Future  -     CBC & Differential; Future  -     Comprehensive Metabolic Panel; Future  -     Lipid Panel; Future  -     Hemoglobin A1c; Future  -     TSH+Free T4; Future  -     MicroAlbumin, Urine, Random - Urine, Clean Catch; Future    7. Benign prostatic hyperplasia with urinary frequency  -     CBC & Differential; Future  -     Comprehensive Metabolic Panel; Future  -     Lipid Panel; Future  -     Hemoglobin A1c; Future  -     CBC & Differential; Future  -     Comprehensive Metabolic Panel; Future  -     Lipid Panel; Future  -     Hemoglobin A1c; Future  -     TSH+Free T4; Future  -     MicroAlbumin, Urine, Random - Urine, Clean Catch; Future    8. Screening PSA (prostate specific antigen)  -     CBC & Differential; Future  -     Comprehensive Metabolic Panel; Future  -     Lipid Panel; Future  -     Hemoglobin A1c; Future  -     CBC & Differential; Future  -     Comprehensive Metabolic Panel; Future  -     Lipid Panel; Future  -     Hemoglobin A1c; Future  -     TSH+Free T4; Future  -     MicroAlbumin, Urine, Random - Urine, Clean Catch; Future    9. BPH with obstruction/lower urinary tract symptoms  -     CBC & Differential; Future  -     Comprehensive  Metabolic Panel; Future  -     Lipid Panel; Future  -     Hemoglobin A1c; Future  -     CBC & Differential; Future  -     Comprehensive Metabolic Panel; Future  -     Lipid Panel; Future  -     Hemoglobin A1c; Future  -     TSH+Free T4; Future  -     MicroAlbumin, Urine, Random - Urine, Clean Catch; Future    10. Type 2 diabetes mellitus treated without insulin  -     CBC & Differential; Future  -     Comprehensive Metabolic Panel; Future  -     Lipid Panel; Future  -     Hemoglobin A1c; Future  -     CBC & Differential; Future  -     Comprehensive Metabolic Panel; Future  -     Lipid Panel; Future  -     Hemoglobin A1c; Future  -     TSH+Free T4; Future  -     MicroAlbumin, Urine, Random - Urine, Clean Catch; Future    11. Other allergic rhinitis  -     CBC & Differential; Future  -     Comprehensive Metabolic Panel; Future  -     Lipid Panel; Future  -     Hemoglobin A1c; Future  -     CBC & Differential; Future  -     Comprehensive Metabolic Panel; Future  -     Lipid Panel; Future  -     Hemoglobin A1c; Future  -     TSH+Free T4; Future  -     MicroAlbumin, Urine, Random - Urine, Clean Catch; Future        Annual wellness visit questionnaire completed June 11, 2024,     Hematuria/ dysuria --patient does follow-up with Dr. Santana urology,  CT abdomen and pelvis March 4, 2024,, mild bladder wall thickening and haziness of the perivesicular fat suggesting possible cystitis there is some haziness around the prostate gland suggesting possible prostatitis there was some renal and hepatic cysts colonic diverticulosis a slight irregularity to the abdominal aorta 2.9 cm but no kidney stones or solid renal masses-----had cystoscopy April 18, 2024    Ckd 3a     Cad, cath, --no stents, med mgt. Dr harvey, ---cont ranexa 500 mg bid , crestor 20 mg qd , coq -10     Copd--ct chest low dose , normal march 2023,,, CT chest low-dose screening April 19, 2024 shows mild emphysema no active airways disease aortic and severe  calcified coronary atherosclerotic disease otherwise negative, recommend 1 year follow-up --------------------------------continues trelegy daily , prn albuterol hfa,   ----chest x-ray January 15, 2024 shows blunting posterior costophrenic sulcus on the left, shadows in the lower lungs could relate to cardiogenic cardiophrenic fat pads degenerative changes of the dorsal spine otherwise    Lower extremity edema 2-3+, treatment options with diuretics discussed, consider changing HydroDIURIL to Lasix or additional, January 17, 2024,, proBNP only 405 January 17, 2024    Pulm htn --cont ?      Htn cont telmisartan 80 mg qd , hctz 25 mg qd     Bph, cont flomax 0.8 mg qhs --does see urology    Dm 2 , cont glipizide xl 10 mg qd ==hga1c =7.8, February 13, 2024, urine microalbumin was - neg ---January 17, 2024------- will add metformin extended release 500 mg to regimen,     Overwgt , ? S off steroids   now ,      Vit d def mild jan 2024     Psa 1.8, jan 2024        Follow Up   Return in about 4 months (around 10/11/2024).  Patient was given instructions and counseling regarding his condition or for health maintenance advice. Please see specific information pulled into the AVS if appropriate.           Answers submitted by the patient for this visit:  Primary Reason for Visit (Submitted on 6/4/2024)  What is the primary reason for your visit?: Other  Other (Submitted on 6/4/2024)  Please describe your symptoms.: Follow up  Have you had these symptoms before?: Yes  How long have you been having these symptoms?: Greater than 2 weeks

## 2024-06-11 NOTE — PROGRESS NOTES
The ABCs of the Annual Wellness Visit  Subsequent Medicare Wellness Visit    Subjective      Kennedy Lin is a 78 y.o. male who presents for a Subsequent Medicare Wellness Visit.    The following portions of the patient's history were reviewed and   updated as appropriate: allergies, current medications, past family history, past medical history, past social history, past surgical history, and problem list.    Compared to one year ago, the patient feels his physical   health is better.    Compared to one year ago, the patient feels his mental   health is better.    Recent Hospitalizations:  He was not admitted to the hospital during the last year.       Current Medical Providers:  Patient Care Team:  César Kumar MD as PCP - General (Internal Medicine)  Moe Ko MD as Consulting Physician (Pulmonary Disease)    Outpatient Medications Prior to Visit   Medication Sig Dispense Refill    albuterol sulfate  (90 Base) MCG/ACT inhaler Inhale 2 puffs Every 6 (Six) Hours As Needed for Wheezing or Shortness of Air. 18 g 5    aspirin 81 MG chewable tablet Chew 1 tablet Daily.      clobetasol propionate (TEMOVATE) 0.05 % cream       coenzyme Q10 100 MG capsule Take 1 capsule by mouth Daily.      Fluticasone-Umeclidin-Vilant (Trelegy Ellipta) 200-62.5-25 MCG/ACT inhaler Inhale 1 puff Daily. Take 1 puff daily 3 each 5    glipizide (GLUCOTROL XL) 10 MG 24 hr tablet TAKE 1 TABLET BY MOUTH ONCE DAILY 90 tablet 3    hydroCHLOROthiazide 25 MG tablet Take 1 tablet by mouth Daily. 90 tablet 3    Lactobacillus (Florajen Acidophilus) capsule Take 1 capsule by mouth Daily. 28 capsule 0    metFORMIN ER (GLUCOPHAGE-XR) 500 MG 24 hr tablet Take 1 tablet by mouth Daily With Breakfast for 90 days. 90 tablet 3    ranolazine (RANEXA) 500 MG 12 hr tablet Take 1 tablet by mouth 2 (Two) Times a Day. 180 tablet 3    rosuvastatin (CRESTOR) 20 MG tablet Take 1 tablet BY MOUTH EVERY NIGHT AT BEDTIME 90 tablet 3     "tamsulosin (FLOMAX) 0.4 MG capsule 24 hr capsule Take 2 capsules by mouth Daily. 180 capsule 4    telmisartan (MICARDIS) 80 MG tablet Take 1 tablet by mouth Daily. 90 tablet 3    Spiriva HandiHaler 18 MCG per inhalation capsule Place 1 capsule into inhaler and inhale Daily. (Patient not taking: Reported on 4/23/2024) 30 capsule 11     No facility-administered medications prior to visit.       No opioid medication identified on active medication list. I have reviewed chart for other potential  high risk medication/s and harmful drug interactions in the elderly.        Aspirin is on active medication list. Aspirin use is indicated based on review of current medical condition/s. Pros and cons of this therapy have been discussed today. Benefits of this medication outweigh potential harm.  Patient has been encouraged to continue taking this medication.  .      Patient Active Problem List   Diagnosis    Essential hypertension    Type 2 diabetes mellitus treated without insulin    Smokeless tobacco use    Coronary artery disease (moderate per cath but not stented)     High cholesterol    Other emphysema    Other allergic rhinitis    Colonoscopy declined (Cologuard ordered in 2023)     BPH with obstruction/lower urinary tract symptoms    Mixed hyperlipidemia    Benign prostatic hyperplasia with lower urinary tract symptoms    Annual physical exam    Screening PSA (prostate specific antigen)    Elevated PSA    Vitamin D deficiency    Skin lesion of back    Dyspnea on exertion    Leukocytosis     Advance Care Planning   Advance Care Planning     Advance Directive is not on file.  ACP discussion was held with the patient during this visit. Patient does not have an advance directive, information provided.     Objective    Vitals:    06/11/24 1048   BP: 112/60   Pulse: 70   Temp: 97.7 °F (36.5 °C)   SpO2: 94%   Weight: 129 kg (284 lb)   Height: 188 cm (74.02\")     Estimated body mass index is 36.45 kg/m² as calculated from the " "following:    Height as of this encounter: 188 cm (74.02\").    Weight as of this encounter: 129 kg (284 lb).    Class 2 Severe Obesity (BMI >=35 and <=39.9). Obesity-related health conditions include the following: hypertension and coronary heart disease. Obesity is unchanged. BMI is is above average; BMI management plan is completed. We discussed low calorie, low carb based diet program, portion control, and increasing exercise.      Does the patient have evidence of cognitive impairment?   No            HEALTH RISK ASSESSMENT    Smoking Status:  Social History     Tobacco Use   Smoking Status Former    Current packs/day: 0.00    Average packs/day: 1.5 packs/day for 20.6 years (30.9 ttl pk-yrs)    Types: Cigarettes    Start date: 1965    Quit date: 3/23/1986    Years since quittin.2    Passive exposure: Past   Smokeless Tobacco Current    Types: Chew     Alcohol Consumption:  Social History     Substance and Sexual Activity   Alcohol Use Yes    Alcohol/week: 1.0 standard drink of alcohol    Types: 1 Cans of beer per week    Comment: occ     Fall Risk Screen:    SUKUMAR Fall Risk Assessment was completed, and patient is at LOW risk for falls.Assessment completed on:2024    Depression Screenin/11/2024    10:52 AM   PHQ-2/PHQ-9 Depression Screening   Little Interest or Pleasure in Doing Things 0-->not at all   Feeling Down, Depressed or Hopeless 0-->not at all   PHQ-9: Brief Depression Severity Measure Score 0       Health Habits and Functional and Cognitive Screenin/11/2024    10:00 AM   Functional & Cognitive Status   Do you have difficulty preparing food and eating? No   Do you have difficulty bathing yourself, getting dressed or grooming yourself? No   Do you have difficulty using the toilet? No   Do you have difficulty moving around from place to place? No   Do you have trouble with steps or getting out of a bed or a chair? No   Current Diet Well Balanced Diet   Dental Exam Up to " date   Eye Exam Up to date   Exercise (times per week) 7 times per week   Current Exercises Include Gardening;Walking;Yard Work   Do you need help using the phone?  No   Are you deaf or do you have serious difficulty hearing?  No   Do you need help to go to places out of walking distance? No   Do you need help shopping? No   Do you need help preparing meals?  No   Do you need help with housework?  No   Do you need help with laundry? No   Do you need help taking your medications? No   Do you need help managing money? No   Do you ever drive or ride in a car without wearing a seat belt? No   Have you felt unusual stress, anger or loneliness in the last month? No   Who do you live with? Spouse   If you need help, do you have trouble finding someone available to you? No   Have you been bothered in the last four weeks by sexual problems? No   Do you have difficulty concentrating, remembering or making decisions? No       Age-appropriate Screening Schedule:  Refer to the list below for future screening recommendations based on patient's age, sex and/or medical conditions. Orders for these recommended tests are listed in the plan section. The patient has been provided with a written plan.    Health Maintenance   Topic Date Due    COLORECTAL CANCER SCREENING  Never done    DIABETIC EYE EXAM  Never done    TDAP/TD VACCINES (1 - Tdap) Never done    ZOSTER VACCINE (1 of 2) Never done    RSV Vaccine - Adults (1 - 1-dose 60+ series) Never done    ANNUAL WELLNESS VISIT  04/28/2024    BMI FOLLOWUP  04/28/2024    HEMOGLOBIN A1C  08/13/2024    COVID-19 Vaccine (1 - 2023-24 season) 07/13/2024 (Originally 9/1/2023)    Pneumococcal Vaccine 65+ (1 of 2 - PCV) 10/17/2024 (Originally 8/23/1951)    INFLUENZA VACCINE  08/01/2024    LIPID PANEL  01/17/2025    URINE MICROALBUMIN  01/17/2025    HEPATITIS C SCREENING  Completed                  CMS Preventative Services Quick Reference  Risk Factors Identified During Encounter:    None  Identified    The above risks/problems have been discussed with the patient.  Pertinent information has been shared with the patient in the After Visit Summary.    There are no diagnoses linked to this encounter.    Follow Up:   Next Medicare Wellness visit to be scheduled in 1 year.      An After Visit Summary and PPPS were made available to the patient.            Answers submitted by the patient for this visit:  Primary Reason for Visit (Submitted on 6/4/2024)  What is the primary reason for your visit?: Other  Other (Submitted on 6/4/2024)  Please describe your symptoms.: Follow up  Have you had these symptoms before?: Yes  How long have you been having these symptoms?: Greater than 2 weeks

## 2024-06-14 DIAGNOSIS — I10 ESSENTIAL (PRIMARY) HYPERTENSION: ICD-10-CM

## 2024-06-14 RX ORDER — TELMISARTAN 80 MG/1
80 TABLET ORAL DAILY
Qty: 30 TABLET | Refills: 0 | Status: SHIPPED | OUTPATIENT
Start: 2024-06-14

## 2024-06-17 ENCOUNTER — OFFICE VISIT (OUTPATIENT)
Dept: UROLOGY | Facility: CLINIC | Age: 79
End: 2024-06-17
Payer: MEDICARE

## 2024-06-17 VITALS
HEIGHT: 74 IN | SYSTOLIC BLOOD PRESSURE: 101 MMHG | DIASTOLIC BLOOD PRESSURE: 46 MMHG | WEIGHT: 283 LBS | HEART RATE: 67 BPM | OXYGEN SATURATION: 99 % | BODY MASS INDEX: 36.32 KG/M2

## 2024-06-17 DIAGNOSIS — N35.914 STRICTURE OF ANTERIOR URETHRA IN MALE, UNSPECIFIED STRICTURE TYPE: ICD-10-CM

## 2024-06-17 DIAGNOSIS — R30.0 DYSURIA: Primary | ICD-10-CM

## 2024-06-17 LAB
BILIRUB BLD-MCNC: NEGATIVE MG/DL
CLARITY, POC: CLEAR
COLOR UR: YELLOW
EXPIRATION DATE: NORMAL
GLUCOSE UR STRIP-MCNC: NEGATIVE MG/DL
KETONES UR QL: NEGATIVE
LEUKOCYTE EST, POC: NEGATIVE
Lab: NORMAL
NITRITE UR-MCNC: NEGATIVE MG/ML
PH UR: 5.5 [PH] (ref 5–8)
PROT UR STRIP-MCNC: NEGATIVE MG/DL
RBC # UR STRIP: NEGATIVE /UL
SP GR UR: 1.02 (ref 1–1.03)
URINE VOLUME: 44
UROBILINOGEN UR QL: NORMAL

## 2024-06-17 PROCEDURE — 1160F RVW MEDS BY RX/DR IN RCRD: CPT | Performed by: NURSE PRACTITIONER

## 2024-06-17 PROCEDURE — 51798 US URINE CAPACITY MEASURE: CPT | Performed by: NURSE PRACTITIONER

## 2024-06-17 PROCEDURE — 1159F MED LIST DOCD IN RCRD: CPT | Performed by: NURSE PRACTITIONER

## 2024-06-17 PROCEDURE — 99213 OFFICE O/P EST LOW 20 MIN: CPT | Performed by: NURSE PRACTITIONER

## 2024-06-17 PROCEDURE — 3078F DIAST BP <80 MM HG: CPT | Performed by: NURSE PRACTITIONER

## 2024-06-17 PROCEDURE — 3074F SYST BP LT 130 MM HG: CPT | Performed by: NURSE PRACTITIONER

## 2024-06-17 PROCEDURE — 81003 URINALYSIS AUTO W/O SCOPE: CPT | Performed by: NURSE PRACTITIONER

## 2024-06-17 NOTE — PROGRESS NOTES
Chief Complaint: Follow-up (Pt states no problems as of now )    Subjective         History of Present Illness  Kennedy Lin is a 78 y.o. male presents to [facility] UROLOGY to be seen for f/u urethral stricture.    Patient saw Dr. Radha Monteiro on 4/18/2024 for cystoscopy related to urethral stricture and hematuria.  Pendulous or urethral stricture was noted approximately 8 Fijian in size unable to advance scope.  Rest of the urethra as well as prostate and bladder was unable to be evaluated.  Unable to remove rule out underlying malignancy or possible etiology for hematuria.  Etiology may be related to stricture however this did warrant complete evaluation.  Dr. Monteiro discussed proceeding to the operating room to address his stricture as well as perform a complete cystoscopy.  Management of stricture would be via DVIU and cystoscopy risks benefits and alternatives were discussed.    His primary concern was noted to be requiring catheterization after other options including urethral dilation would not require a catheter however these do have a high risk of recurrence.    Patient was agreeable to DVIU and cystoscopy however wanted to wait until December.    Patient is here to follow-up to evaluate his symptoms.    He is doing well at this time.     We discussed procedure once again.     Discussed benefits including preserving upper urinary tracts, prevention of Urinary infections, bladder diverticula or stones as well as decreased compliance of the bladder.    Currently having no symptoms.     No issues.        Objective     Past Medical History:   Diagnosis Date    Allergic rhinitis     Asthma     Broken bones     Coccygeal fracture     Coronary artery disease     Diabetes mellitus, type 2     Elevated hemoglobin A1c     Encounter for Medicare annual wellness exam     History of kidney stones     Hyperlipidemia     Hypertension     Lumbar vertebral fracture     Malignant melanoma of skin      Obesity, Class I, BMI 30-34.9     Other emphysema 03/10/2023    Post-nasal drainage     Pulmonary arterial hypertension     Rash     Recurrent nephrolithiasis     Right rotator cuff tear     Tobacco chew use     Urinary tract infection     Viral infection     Vitamin D deficiency        Past Surgical History:   Procedure Laterality Date    CARDIAC CATHETERIZATION N/A 06/01/2022    Procedure: Left Heart Cath-Dr. Gill wants to do this to follow his device case;  Surgeon: ROBINSON Gill MD;  Location: [facility] CATH INVASIVE LOCATION;  Service: Cardiology;  Laterality: N/A;    INGUINAL HERNIA REPAIR      ROTATOR CUFF REPAIR      SHOULDER SURGERY           Current Outpatient Medications:     albuterol sulfate  (90 Base) MCG/ACT inhaler, Inhale 2 puffs Every 6 (Six) Hours As Needed for Wheezing or Shortness of Air., Disp: 18 g, Rfl: 5    aspirin 81 MG chewable tablet, Chew 1 tablet Daily., Disp: , Rfl:     Fluticasone-Umeclidin-Vilant (Trelegy Ellipta) 200-62.5-25 MCG/ACT inhaler, Inhale 1 puff Daily. Take 1 puff daily, Disp: 3 each, Rfl: 5    glipizide (GLUCOTROL XL) 10 MG 24 hr tablet, TAKE 1 TABLET BY MOUTH ONCE DAILY, Disp: 90 tablet, Rfl: 3    hydroCHLOROthiazide 25 MG tablet, Take 1 tablet by mouth Daily., Disp: 90 tablet, Rfl: 3    ranolazine (RANEXA) 500 MG 12 hr tablet, Take 1 tablet by mouth 2 (Two) Times a Day., Disp: 180 tablet, Rfl: 3    rosuvastatin (CRESTOR) 20 MG tablet, Take 1 tablet BY MOUTH EVERY NIGHT AT BEDTIME, Disp: 90 tablet, Rfl: 3    tamsulosin (FLOMAX) 0.4 MG capsule 24 hr capsule, Take 2 capsules by mouth Daily., Disp: 180 capsule, Rfl: 4    telmisartan (MICARDIS) 80 MG tablet, Take 1 tablet by mouth Daily. DUE FOR OFFICE VISIT FOR FURTHER REFILLS, Disp: 30 tablet, Rfl: 0    metFORMIN ER (GLUCOPHAGE-XR) 500 MG 24 hr tablet, Take 1 tablet by mouth Daily With Breakfast for 90 days., Disp: 90 tablet, Rfl: 3    Allergies   Allergen Reactions    Grass Unknown - Low Severity        Family  "History   Problem Relation Age of Onset    COPD Mother     Other Father         Brain tumor     Diabetes Maternal Grandmother     Hypertension Paternal Grandfather     Other Paternal Grandfather         acute myocardial infarction     Colon cancer Neg Hx     Prostate cancer Neg Hx     Thyroid disease Neg Hx        Social History     Socioeconomic History    Marital status:    Tobacco Use    Smoking status: Former     Current packs/day: 0.00     Average packs/day: 1.5 packs/day for 20.6 years (30.9 ttl pk-yrs)     Types: Cigarettes     Start date: 1965     Quit date: 3/23/1986     Years since quittin.2     Passive exposure: Past    Smokeless tobacco: Current     Types: Chew   Vaping Use    Vaping status: Never Used   Substance and Sexual Activity    Alcohol use: Yes     Alcohol/week: 1.0 standard drink of alcohol     Types: 1 Cans of beer per week     Comment: occ    Drug use: No    Sexual activity: Yes     Partners: Female       Vital Signs:   /46 (BP Location: Left arm, Patient Position: Sitting, Cuff Size: Large Adult)   Pulse 67   Ht 188 cm (74.02\")   Wt 128 kg (283 lb)   SpO2 99%   BMI 36.32 kg/m²      Physical Exam     Result Review :   The following data was reviewed by: NURY Michaels on 2024:  Results for orders placed or performed in visit on 24   Bladder Scan   Result Value Ref Range    Urine Volume 44    POC Urinalysis Dipstick, Automated    Specimen: Urine   Result Value Ref Range    Color Yellow Yellow, Straw, Dark Yellow, Shanti    Clarity, UA Clear Clear    Specific Gravity  1.020 1.005 - 1.030    pH, Urine 5.5 5.0 - 8.0    Leukocytes Negative Negative    Nitrite, UA Negative Negative    Protein, POC Negative Negative mg/dL    Glucose, UA Negative Negative mg/dL    Ketones, UA Negative Negative    Urobilinogen, UA 0.2 E.U./dL Normal, 0.2 E.U./dL    Bilirubin Negative Negative    Blood, UA Negative Negative    Lot Number [id number]     Expiration Date  "       PSA          1/17/2024    10:24   PSA   PSA 1.840      Bladder Scan interpretation 06/17/2024    Estimation of residual urine via BVI 3000 Verathon Bladder Scan  MA/nurse performing: lane vivas Ma   Residual Urine: 44 ml  Indication: Dysuria    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   Findings: An anechoic area was demonstrated which represented the bladder, with measurement of residual urine as noted. I inspected this myself. In that the residual urine was stable or insignificant, refer to plan for treatment and plan necessary at this time.          Procedures        Assessment and Plan    Diagnoses and all orders for this visit:    1. Dysuria (Primary)  -     Bladder Scan  -     POC Urinalysis Dipstick, Automated    2. 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     Will place orders.     Will add to OR schedule for selected dates when he calls       I spent 10 minutes caring for Kennedy on this date of service. This time includes time spent by me in the following activities:reviewing tests, obtaining and/or reviewing a separately obtained history, performing a medically appropriate examination and/or evaluation , counseling and educating the patient/family/caregiver, ordering medications, tests, or procedures, and documenting information in the medical record  Follow Up   Return in about 3 months (around 9/17/2024) for f/u DVIU discussion .  Patient was given instructions and counseling regarding his condition or for health maintenance advice. Please see specific information pulled into the AVS if appropriate.         This document has been electronically signed by NURY Michaels  June 17, 2024 11:37 EDT

## 2024-06-18 ENCOUNTER — TELEPHONE (OUTPATIENT)
Dept: UROLOGY | Facility: CLINIC | Age: 79
End: 2024-06-18
Payer: MEDICARE

## 2024-06-18 NOTE — TELEPHONE ENCOUNTER
Spoke to pt about surgery dates. PT states he needs 8/26/2024. Informed I would have to get this day approved and could not make any promises, pt understood.

## 2024-06-26 ENCOUNTER — TELEPHONE (OUTPATIENT)
Dept: UROLOGY | Facility: CLINIC | Age: 79
End: 2024-06-26
Payer: MEDICARE

## 2024-06-26 ENCOUNTER — PREP FOR SURGERY (OUTPATIENT)
Dept: OTHER | Facility: HOSPITAL | Age: 79
End: 2024-06-26
Payer: MEDICARE

## 2024-06-26 ENCOUNTER — TELEPHONE (OUTPATIENT)
Dept: INTERNAL MEDICINE | Age: 79
End: 2024-06-26
Payer: MEDICARE

## 2024-06-26 DIAGNOSIS — R30.0 DYSURIA: Primary | ICD-10-CM

## 2024-06-26 DIAGNOSIS — N35.914 STRICTURE OF ANTERIOR URETHRA IN MALE, UNSPECIFIED STRICTURE TYPE: Primary | ICD-10-CM

## 2024-06-26 NOTE — TELEPHONE ENCOUNTER
Called and spoke to pt; informed of surgery on 8/26/24. Spoke to Gisselle. Phone PAT. Urine culture one week prior. Sent pt .

## 2024-06-26 NOTE — TELEPHONE ENCOUNTER
Print off the message fax that came across so that I can send the order to hold his anticoagulation you will have to put that on my desk

## 2024-07-22 DIAGNOSIS — I10 ESSENTIAL (PRIMARY) HYPERTENSION: ICD-10-CM

## 2024-07-22 RX ORDER — TELMISARTAN 80 MG/1
TABLET ORAL
Qty: 30 TABLET | Refills: 0 | OUTPATIENT
Start: 2024-07-22

## 2024-07-23 RX ORDER — TELMISARTAN 80 MG/1
TABLET ORAL
Qty: 90 TABLET | Refills: 3 | Status: SHIPPED | OUTPATIENT
Start: 2024-07-23

## 2024-07-30 ENCOUNTER — OFFICE VISIT (OUTPATIENT)
Dept: PULMONOLOGY | Facility: CLINIC | Age: 79
End: 2024-07-30
Payer: MEDICARE

## 2024-07-30 VITALS
HEIGHT: 74 IN | HEART RATE: 70 BPM | WEIGHT: 288.8 LBS | OXYGEN SATURATION: 95 % | BODY MASS INDEX: 37.06 KG/M2 | SYSTOLIC BLOOD PRESSURE: 114 MMHG | DIASTOLIC BLOOD PRESSURE: 81 MMHG | TEMPERATURE: 98.6 F | RESPIRATION RATE: 18 BRPM

## 2024-07-30 DIAGNOSIS — J43.2 CENTRILOBULAR EMPHYSEMA: Primary | ICD-10-CM

## 2024-07-30 PROCEDURE — 3079F DIAST BP 80-89 MM HG: CPT | Performed by: INTERNAL MEDICINE

## 2024-07-30 PROCEDURE — 1160F RVW MEDS BY RX/DR IN RCRD: CPT | Performed by: INTERNAL MEDICINE

## 2024-07-30 PROCEDURE — 99213 OFFICE O/P EST LOW 20 MIN: CPT | Performed by: INTERNAL MEDICINE

## 2024-07-30 PROCEDURE — 1159F MED LIST DOCD IN RCRD: CPT | Performed by: INTERNAL MEDICINE

## 2024-07-30 PROCEDURE — 3074F SYST BP LT 130 MM HG: CPT | Performed by: INTERNAL MEDICINE

## 2024-07-30 NOTE — PROGRESS NOTES
Pulmonary Office Follow-up    Subjective     Kennedy Lin is seen today at the office for   Chief Complaint   Patient presents with    Follow-up     3 month    Shortness of Breath    Emphysema    Bronchitis         HPI  Kennedy Lin is a 78 y.o. male with a PMH significant for COPD and tobacco abuse presents for follow-up patient has been doing well as far as his breathing and his effort tolerance is getting better he denies any significant cough or expectoration and has been staying active      Tobacco use history:  Former smoker      Patient Active Problem List   Diagnosis    Essential hypertension    Type 2 diabetes mellitus treated without insulin    Smokeless tobacco use    Medicare annual wellness visit, subsequent    Coronary artery disease (moderate per cath but not stented)     High cholesterol    Other emphysema    Other allergic rhinitis    Colonoscopy declined (Cologuard ordered in 2023)     BPH with obstruction/lower urinary tract symptoms    Mixed hyperlipidemia    Benign prostatic hyperplasia with lower urinary tract symptoms    Annual physical exam    Screening PSA (prostate specific antigen)    Elevated PSA    Vitamin D deficiency    Skin lesion of back    Dyspnea on exertion    Leukocytosis    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    All other systems reviewed and are negative.    As described in the HPI. Otherwise, remainder of ROS (14 systems) were negative.    Medications, Allergies, Social, and Family Histories reviewed as per EMR.    Result Review :            Objective     Vitals:    07/30/24 1055   BP: 114/81   Pulse: 70   Resp: 18   Temp: 98.6 °F (37 °C)   SpO2: 95%         07/30/24  1055   Weight: 131 kg (288 lb 12.8 oz)       Physical Exam  Vitals and nursing note reviewed.   Constitutional:       Appearance: He is obese.   HENT:      Head: Normocephalic and atraumatic.      Nose:  Nose normal.      Mouth/Throat:      Mouth: Mucous membranes are moist.      Pharynx: Oropharynx is clear.   Eyes:      Extraocular Movements: Extraocular movements intact.      Conjunctiva/sclera: Conjunctivae normal.      Pupils: Pupils are equal, round, and reactive to light.   Cardiovascular:      Rate and Rhythm: Normal rate and regular rhythm.      Pulses: Normal pulses.      Heart sounds: Normal heart sounds.   Pulmonary:      Effort: Pulmonary effort is normal.      Breath sounds: Normal breath sounds.   Abdominal:      General: Abdomen is flat. Bowel sounds are normal.      Palpations: Abdomen is soft.   Musculoskeletal:         General: Normal range of motion.      Cervical back: Normal range of motion and neck supple.   Skin:     General: Skin is warm.      Capillary Refill: Capillary refill takes 2 to 3 seconds.   Neurological:      General: No focal deficit present.      Mental Status: He is alert and oriented to person, place, and time.   Psychiatric:         Mood and Affect: Mood normal.         Behavior: Behavior normal.         No radiology results for the last 90 days.     Assessment & Plan     Diagnoses and all orders for this visit:    1. Centrilobular emphysema (Primary)         Discussion/ Recommendations:   Patient is advised to continue using his Trelegy daily  Regular exercise  Weight reduction BMI is 37.06  Vaccinations discussed and recommended             Return in about 6 months (around 1/30/2025).          This document has been electronically signed by Moe Ko MD on July 30, 2024 11:10 EDT

## 2024-08-05 ENCOUNTER — TELEPHONE (OUTPATIENT)
Dept: CARDIOLOGY | Facility: CLINIC | Age: 79
End: 2024-08-05
Payer: MEDICARE

## 2024-08-05 RX ORDER — ROSUVASTATIN CALCIUM 20 MG/1
20 TABLET, COATED ORAL
Qty: 90 TABLET | Refills: 3 | Status: SHIPPED | OUTPATIENT
Start: 2024-08-05

## 2024-08-05 NOTE — TELEPHONE ENCOUNTER
The [facility] received a fax that requires your attention. The document has been indexed to the patient’s chart for your review.      Reason for sending: EXTERNAL MEDICAL RECORD NOTIFICATION     Documents Description: ROS[facility]ASTATIN REFILL-[facility]-8.5.24    Name of Sender: [facility]     Date Indexed: 8.5.24

## 2024-08-19 ENCOUNTER — TELEPHONE (OUTPATIENT)
Dept: UROLOGY | Facility: CLINIC | Age: 79
End: 2024-08-19
Payer: MEDICARE

## 2024-08-19 NOTE — TELEPHONE ENCOUNTER
PT IS SCHEDULED FOR CYSTOSCOPY URETHROTOMY ON MONDAY 8/26. HE HAS SOME PREOP QUESTIONS, DOES HE NEED ANY LABS OR A URINE CULTURE, AND DOES HE NEED TO HOLD ANY MEDS. ASKING FOR A CALL BACK PLEASE.

## 2024-08-20 ENCOUNTER — LAB (OUTPATIENT)
Dept: LAB | Facility: HOSPITAL | Age: 79
End: 2024-08-20
Payer: MEDICARE

## 2024-08-20 DIAGNOSIS — R35.0 BENIGN PROSTATIC HYPERPLASIA WITH URINARY FREQUENCY: ICD-10-CM

## 2024-08-20 DIAGNOSIS — E78.2 MIXED HYPERLIPIDEMIA: ICD-10-CM

## 2024-08-20 DIAGNOSIS — N40.1 BPH WITH OBSTRUCTION/LOWER URINARY TRACT SYMPTOMS: ICD-10-CM

## 2024-08-20 DIAGNOSIS — R30.0 DYSURIA: ICD-10-CM

## 2024-08-20 DIAGNOSIS — E55.9 VITAMIN D DEFICIENCY: ICD-10-CM

## 2024-08-20 DIAGNOSIS — Z00.00 MEDICARE ANNUAL WELLNESS VISIT, SUBSEQUENT: ICD-10-CM

## 2024-08-20 DIAGNOSIS — J30.89 OTHER ALLERGIC RHINITIS: ICD-10-CM

## 2024-08-20 DIAGNOSIS — I25.10 CORONARY ARTERY DISEASE INVOLVING NATIVE CORONARY ARTERY OF NATIVE HEART WITHOUT ANGINA PECTORIS: ICD-10-CM

## 2024-08-20 DIAGNOSIS — N40.1 BENIGN PROSTATIC HYPERPLASIA WITH URINARY FREQUENCY: ICD-10-CM

## 2024-08-20 DIAGNOSIS — Z12.5 SCREENING PSA (PROSTATE SPECIFIC ANTIGEN): ICD-10-CM

## 2024-08-20 DIAGNOSIS — R06.09 DYSPNEA ON EXERTION: ICD-10-CM

## 2024-08-20 DIAGNOSIS — E11.9 TYPE 2 DIABETES MELLITUS TREATED WITHOUT INSULIN: ICD-10-CM

## 2024-08-20 DIAGNOSIS — N13.8 BPH WITH OBSTRUCTION/LOWER URINARY TRACT SYMPTOMS: ICD-10-CM

## 2024-08-20 DIAGNOSIS — I10 ESSENTIAL HYPERTENSION: ICD-10-CM

## 2024-08-20 PROCEDURE — 87086 URINE CULTURE/COLONY COUNT: CPT

## 2024-08-22 LAB — BACTERIA SPEC AEROBE CULT: NO GROWTH

## 2024-08-26 ENCOUNTER — TELEPHONE (OUTPATIENT)
Dept: UROLOGY | Facility: CLINIC | Age: 79
End: 2024-08-26
Payer: MEDICARE

## 2024-08-26 DIAGNOSIS — N40.1 BENIGN PROSTATIC HYPERPLASIA WITH URINARY HESITANCY: ICD-10-CM

## 2024-08-26 DIAGNOSIS — R39.11 BENIGN PROSTATIC HYPERPLASIA WITH URINARY HESITANCY: ICD-10-CM

## 2024-08-26 RX ORDER — TAMSULOSIN HYDROCHLORIDE 0.4 MG/1
2 CAPSULE ORAL DAILY
Qty: 180 CAPSULE | Refills: 4 | Status: SHIPPED | OUTPATIENT
Start: 2024-08-26

## 2024-08-28 ENCOUNTER — ANESTHESIA EVENT (OUTPATIENT)
Dept: PERIOP | Facility: HOSPITAL | Age: 79
End: 2024-08-28
Payer: MEDICARE

## 2024-08-28 ENCOUNTER — HOSPITAL ENCOUNTER (OUTPATIENT)
Facility: HOSPITAL | Age: 79
Setting detail: HOSPITAL OUTPATIENT SURGERY
Discharge: HOME | End: 2024-08-28
Attending: UROLOGY | Admitting: UROLOGY
Payer: MEDICARE

## 2024-08-28 ENCOUNTER — ANESTHESIA (OUTPATIENT)
Dept: PERIOP | Facility: HOSPITAL | Age: 79
End: 2024-08-28
Payer: MEDICARE

## 2024-08-28 VITALS
WEIGHT: 284.17 LBS | TEMPERATURE: 97.2 F | OXYGEN SATURATION: 98 % | RESPIRATION RATE: 16 BRPM | HEART RATE: 82 BPM | SYSTOLIC BLOOD PRESSURE: 136 MMHG | DIASTOLIC BLOOD PRESSURE: 64 MMHG | HEIGHT: 74 IN | BODY MASS INDEX: 36.47 KG/M2

## 2024-08-28 DIAGNOSIS — N35.914 STRICTURE OF ANTERIOR URETHRA IN MALE, UNSPECIFIED STRICTURE TYPE: ICD-10-CM

## 2024-08-28 LAB
GLUCOSE BLDC GLUCOMTR-MCNC: 117 MG/DL (ref 70–99)
QT INTERVAL: 426 MS
QTC INTERVAL: 434 MS

## 2024-08-28 PROCEDURE — 82948 REAGENT STRIP/BLOOD GLUCOSE: CPT | Performed by: UROLOGY

## 2024-08-28 PROCEDURE — 25010000002 ONDANSETRON PER 1 MG

## 2024-08-28 PROCEDURE — 25010000002 CEFAZOLIN PER 500 MG: Performed by: UROLOGY

## 2024-08-28 PROCEDURE — S0260 H&P FOR SURGERY: HCPCS | Performed by: UROLOGY

## 2024-08-28 PROCEDURE — 25010000002 MIDAZOLAM PER 1MG: Performed by: ANESTHESIOLOGY

## 2024-08-28 PROCEDURE — 25010000002 PROPOFOL 10 MG/ML EMULSION: Performed by: NURSE ANESTHETIST, CERTIFIED REGISTERED

## 2024-08-28 PROCEDURE — 93005 ELECTROCARDIOGRAM TRACING: CPT | Performed by: ANESTHESIOLOGY

## 2024-08-28 PROCEDURE — 25810000003 LACTATED RINGERS PER 1000 ML: Performed by: ANESTHESIOLOGY

## 2024-08-28 PROCEDURE — C1769 GUIDE WIRE: HCPCS | Performed by: UROLOGY

## 2024-08-28 PROCEDURE — 52276 CYSTOSCOPY AND TREATMENT: CPT | Performed by: UROLOGY

## 2024-08-28 RX ORDER — IBUPROFEN 600 MG/1
600 TABLET, FILM COATED ORAL EVERY 6 HOURS PRN
Status: DISCONTINUED | OUTPATIENT
Start: 2024-08-28 | End: 2024-08-28 | Stop reason: HOSPADM

## 2024-08-28 RX ORDER — ONDANSETRON 2 MG/ML
INJECTION INTRAMUSCULAR; INTRAVENOUS AS NEEDED
Status: DISCONTINUED | OUTPATIENT
Start: 2024-08-28 | End: 2024-08-28 | Stop reason: SURG

## 2024-08-28 RX ORDER — MAGNESIUM HYDROXIDE 1200 MG/15ML
LIQUID ORAL AS NEEDED
Status: DISCONTINUED | OUTPATIENT
Start: 2024-08-28 | End: 2024-08-28 | Stop reason: HOSPADM

## 2024-08-28 RX ORDER — SODIUM CHLORIDE, SODIUM LACTATE, POTASSIUM CHLORIDE, CALCIUM CHLORIDE 600; 310; 30; 20 MG/100ML; MG/100ML; MG/100ML; MG/100ML
9 INJECTION, SOLUTION INTRAVENOUS CONTINUOUS PRN
Status: DISCONTINUED | OUTPATIENT
Start: 2024-08-28 | End: 2024-08-28 | Stop reason: HOSPADM

## 2024-08-28 RX ORDER — LIDOCAINE HYDROCHLORIDE 20 MG/ML
INJECTION, SOLUTION EPIDURAL; INFILTRATION; INTRACAUDAL; PERINEURAL AS NEEDED
Status: DISCONTINUED | OUTPATIENT
Start: 2024-08-28 | End: 2024-08-28 | Stop reason: SURG

## 2024-08-28 RX ORDER — ONDANSETRON 2 MG/ML
4 INJECTION INTRAMUSCULAR; INTRAVENOUS ONCE AS NEEDED
Status: DISCONTINUED | OUTPATIENT
Start: 2024-08-28 | End: 2024-08-28 | Stop reason: HOSPADM

## 2024-08-28 RX ORDER — PHENAZOPYRIDINE HYDROCHLORIDE 200 MG/1
200 TABLET, FILM COATED ORAL 3 TIMES DAILY PRN
Qty: 12 TABLET | Refills: 0 | Status: SHIPPED | OUTPATIENT
Start: 2024-08-28

## 2024-08-28 RX ORDER — PROMETHAZINE HYDROCHLORIDE 25 MG/1
25 SUPPOSITORY RECTAL ONCE AS NEEDED
Status: DISCONTINUED | OUTPATIENT
Start: 2024-08-28 | End: 2024-08-28 | Stop reason: HOSPADM

## 2024-08-28 RX ORDER — OXYBUTYNIN CHLORIDE 5 MG/1
5 TABLET ORAL 3 TIMES DAILY PRN
Qty: 12 TABLET | Refills: 0 | Status: SHIPPED | OUTPATIENT
Start: 2024-08-28

## 2024-08-28 RX ORDER — PROMETHAZINE HYDROCHLORIDE 12.5 MG/1
12.5 TABLET ORAL ONCE AS NEEDED
Status: DISCONTINUED | OUTPATIENT
Start: 2024-08-28 | End: 2024-08-28 | Stop reason: HOSPADM

## 2024-08-28 RX ORDER — ASPIRIN 81 MG/1
81 TABLET, CHEWABLE ORAL DAILY
Start: 2024-08-31

## 2024-08-28 RX ORDER — HYDROCODONE BITARTRATE AND ACETAMINOPHEN 5; 325 MG/1; MG/1
1-2 TABLET ORAL EVERY 6 HOURS PRN
Qty: 12 TABLET | Refills: 0 | Status: SHIPPED | OUTPATIENT
Start: 2024-08-28

## 2024-08-28 RX ORDER — PROMETHAZINE HYDROCHLORIDE 12.5 MG/1
25 TABLET ORAL ONCE AS NEEDED
Status: DISCONTINUED | OUTPATIENT
Start: 2024-08-28 | End: 2024-08-28 | Stop reason: HOSPADM

## 2024-08-28 RX ORDER — PROPOFOL 10 MG/ML
VIAL (ML) INTRAVENOUS AS NEEDED
Status: DISCONTINUED | OUTPATIENT
Start: 2024-08-28 | End: 2024-08-28 | Stop reason: SURG

## 2024-08-28 RX ORDER — ACETAMINOPHEN 325 MG/1
650 TABLET ORAL ONCE
Status: DISCONTINUED | OUTPATIENT
Start: 2024-08-28 | End: 2024-08-28 | Stop reason: HOSPADM

## 2024-08-28 RX ORDER — DEXMEDETOMIDINE HYDROCHLORIDE 4 UG/ML
INJECTION, SOLUTION INTRAVENOUS AS NEEDED
Status: DISCONTINUED | OUTPATIENT
Start: 2024-08-28 | End: 2024-08-28 | Stop reason: SURG

## 2024-08-28 RX ORDER — MIDAZOLAM HYDROCHLORIDE 2 MG/2ML
1 INJECTION, SOLUTION INTRAMUSCULAR; INTRAVENOUS ONCE
Status: COMPLETED | OUTPATIENT
Start: 2024-08-28 | End: 2024-08-28

## 2024-08-28 RX ORDER — ACETAMINOPHEN 500 MG
1000 TABLET ORAL ONCE
Status: COMPLETED | OUTPATIENT
Start: 2024-08-28 | End: 2024-08-28

## 2024-08-28 RX ORDER — OXYCODONE HYDROCHLORIDE 5 MG/1
5 TABLET ORAL
Status: DISCONTINUED | OUTPATIENT
Start: 2024-08-28 | End: 2024-08-28 | Stop reason: HOSPADM

## 2024-08-28 RX ORDER — ONDANSETRON 4 MG/1
4 TABLET, ORALLY DISINTEGRATING ORAL ONCE AS NEEDED
Status: DISCONTINUED | OUTPATIENT
Start: 2024-08-28 | End: 2024-08-28 | Stop reason: HOSPADM

## 2024-08-28 RX ADMIN — DEXMEDETOMIDINE HYDROCHLORIDE IN 0.9% SODIUM CHLORIDE 4 MCG: 4 INJECTION INTRAVENOUS at 15:42

## 2024-08-28 RX ADMIN — DEXMEDETOMIDINE HYDROCHLORIDE IN 0.9% SODIUM CHLORIDE 4 MCG: 4 INJECTION INTRAVENOUS at 15:44

## 2024-08-28 RX ADMIN — DEXMEDETOMIDINE HYDROCHLORIDE IN 0.9% SODIUM CHLORIDE 4 MCG: 4 INJECTION INTRAVENOUS at 15:46

## 2024-08-28 RX ADMIN — SODIUM CHLORIDE, POTASSIUM CHLORIDE, SODIUM LACTATE AND CALCIUM CHLORIDE 9 ML/HR: 600; 310; 30; 20 INJECTION, SOLUTION INTRAVENOUS at 14:39

## 2024-08-28 RX ADMIN — MIDAZOLAM HYDROCHLORIDE 1 MG: 1 INJECTION, SOLUTION INTRAMUSCULAR; INTRAVENOUS at 15:26

## 2024-08-28 RX ADMIN — LIDOCAINE HYDROCHLORIDE 100 MG: 20 INJECTION, SOLUTION INTRAVENOUS at 15:49

## 2024-08-28 RX ADMIN — DEXMEDETOMIDINE HYDROCHLORIDE IN 0.9% SODIUM CHLORIDE 4 MCG: 4 INJECTION INTRAVENOUS at 15:52

## 2024-08-28 RX ADMIN — ACETAMINOPHEN 1000 MG: 500 TABLET ORAL at 14:38

## 2024-08-28 RX ADMIN — PROPOFOL 150 MG: 10 INJECTION, EMULSION INTRAVENOUS at 15:50

## 2024-08-28 RX ADMIN — DEXMEDETOMIDINE HYDROCHLORIDE IN 0.9% SODIUM CHLORIDE 4 MCG: 4 INJECTION INTRAVENOUS at 15:49

## 2024-08-28 RX ADMIN — SODIUM CHLORIDE 3 G: 9 INJECTION, SOLUTION INTRAVENOUS at 15:40

## 2024-08-28 RX ADMIN — ONDANSETRON HYDROCHLORIDE 4 MG: 2 SOLUTION INTRAMUSCULAR; INTRAVENOUS at 16:21

## 2024-08-28 NOTE — ANESTHESIA PREPROCEDURE EVALUATION
Anesthesia Evaluation     Patient summary reviewed and Nursing notes reviewed   no history of anesthetic complications:   NPO Solid Status: > 8 hours  NPO Liquid Status: > 2 hours           Airway   Mallampati: III  TM distance: >3 FB  Neck ROM: full  No difficulty expected and Large neck circumference  Dental      Pulmonary - normal exam    breath sounds clear to auscultation  (+) a smoker Former, COPD,shortness of breath  Cardiovascular - normal exam  Exercise tolerance: good (4-7 METS)    Rhythm: regular  Rate: normal    (+) hypertension, CAD (nonobstructive dz), hyperlipidemia      Neuro/Psych- negative ROS  GI/Hepatic/Renal/Endo    (+) renal disease-, diabetes mellitus    Musculoskeletal (-) negative ROS    Abdominal    Substance History - negative use     OB/GYN negative ob/gyn ROS         Other - negative ROS       ROS/Med Hx Other: PAT Nursing Notes unavailable.    Echo 2022- EF 56-60%, grade I diastolic dysfunction, mild aortic sclerotic disease    Cath 2022- nonobstructive disease    Cleared by cardiology              Anesthesia Plan    ASA 3     general     (Patient understands anesthesia not responsible for dental damage.)  intravenous induction     Anesthetic plan, risks, benefits, and alternatives have been provided, discussed and informed consent has been obtained with: patient.    Use of blood products discussed with patient .    Plan discussed with CRNA.    CODE STATUS:

## 2024-08-28 NOTE — H&P
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 male history ofStricture of anterior urethra in male, unspecified stricture type [N35.914] who presents for further management OR.  Presents for planned Procedure(s):  CYSTOSCOPY URETHROTOMY VISUAL INTERNAL;  .   Risk, benefits, and alternatives discussed with patient prior to today.All questions were addressed after providing time for discussion.  Patient denies significant changes since last visit.  No new complaints today.    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; able to access the meatus without difficulty; cystoscope inserted, at approximately 5 cm, urethral stricture approximately 8 Mongolian appreciated; unable to advance cystoscope further     Urine culture 2024: No growth    Review of Systems   All systems were reviewed and negative except for the above  Personal History     Past Medical History:   Diagnosis Date    Allergic rhinitis     Asthma     Broken bones     Coccygeal fracture     Coronary artery disease     WELLS NO HX MI OR INTERVENTION    Diabetes mellitus, type 2     Elevated hemoglobin A1c     Encounter for Medicare annual wellness exam     History of kidney stones     Hyperlipidemia     Hypertension     Lumbar vertebral fracture     Malignant melanoma of skin     Obesity, Class I, BMI 30-34.9     Other emphysema 03/10/2023    Post-nasal drainage     Pulmonary arterial hypertension     Rash     Recurrent nephrolithiasis     Right rotator cuff tear     SOB (shortness of breath) on exertion     Tobacco chew use     Urinary tract  infection     Viral infection     Vitamin D deficiency        Past Surgical History:   Procedure Laterality Date    CARDIAC CATHETERIZATION N/A 06/01/2022    Procedure: Left Heart Cath-Dr. Gill wants to do this to follow his device case;  Surgeon: ROBINSON Gill MD;  Location: [facility] CATH INVASIVE LOCATION;  Service: Cardiology;  Laterality: N/A;    INGUINAL HERNIA REPAIR      ROTATOR CUFF REPAIR Right     SINUS SURGERY      FRACTURE REPAIR       Family History: family history includes COPD in his mother; Diabetes in his maternal grandmother; Hypertension in his paternal grandfather. Otherwise pertinent FHx was reviewed and not pertinent to current issue.    Social History:  reports that he quit smoking about 38 years ago. His smoking use included cigarettes. He started smoking about 59 years ago. He has a 30.9 pack-year smoking history. He has been exposed to tobacco smoke. His smokeless tobacco use includes chew. He reports current alcohol use of about 1.0 standard drink of alcohol per week. He reports that he does not use drugs.    Home Medications:  Fluticasone-Umeclidin-Vilant, albuterol sulfate HFA, aspirin, glipizide, hydroCHLOROthiazide, metFORMIN ER, ranolazine, rosuvastatin, tamsulosin, and telmisartan    Allergies:  Allergies   Allergen Reactions    Grass Rash       Objective    Objective     Vitals:   Temp:  [97.7 °F (36.5 °C)] 97.7 °F (36.5 °C)  Heart Rate:  [69] 69  Resp:  [20] 20  BP: (131)/(67) 131/67    Physical Exam:   Constitutional: Awake, alert   Respiratory: Clear, nonlabored respirations    Cardiovascular: Regular rate, no chest retractions   gastrointestinal: Appears soft, nontender     Results:    Assessment & Plan   Assessment / Plan     Brief Patient Summary:  Kennedy Lin is a 79 y.o. 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

## 2024-08-28 NOTE — ANESTHESIA POSTPROCEDURE EVALUATION
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        Post Anesthesia Care and Evaluation    Patient location during evaluation: bedside  Patient participation: complete - patient participated  Level of consciousness: awake    Airway patency: patent  PONV Status: none  Cardiovascular status: acceptable  Respiratory status: acceptable  Hydration status: acceptable

## 2024-08-28 NOTE — OP NOTE
Preoperative diagnosis  Urethral stricture, hematuria    Postoperative diagnosis  Urethral stricture, BPH, hematuria    Procedure performed  Cystoscopy, direct visual internal urethrotomy    Attending surgeon  Radha Monteiro MD     Anesthesia  General    EBL  0 mL    Complications  None    Specimen  None    Findings  Redundant preputial tissue obscuring the glans, able to access meatus without difficulty; bulbar urethral stricture approximately 6 6 Omani, able to accommodate sensor wire; stricture cut under direct vision; prostatomegaly with bilateral coapting lateral lobes noted; no significant median lobe cystoscopy of bladder reveals no mucosal abnormalities, large capacity bladder, bilateral orthotopic ureters  22 Omani Abraham catheter, 30 cc balloon    Indications  79 y.o. male agreed to undergo the above named procedure after discussion of the alternatives, risks and benefits.   Informed consent was obtained.      Procedure  He is brought back to the operating room where he was given anesthesia, placed in the lithotomy position and then prepped and draped in the usual standard sterile manner.  Timeout was performed.  A cystoscope was then gently placed into the urethral meatus and the length of the urethra inspected. A tight strictured area was noted in the bulbous urethra, sensor wire was passed through the stricture. The stricture was amenable to DVIU. A DVIU scope and sheath were placed into the urethra and the stricture was incised under direct vision using a cold Hoff knife. The entire depth of the stricture was incised until consistent bleeding occurred from the site. After the DVIU a 22 Omani cystoscope was passed beyond the strictured area and the remainder of the urethra was inspected. No further lesions or stricture were noted. The prostate was noted to be enlarged with bilateral coapting lateral lobes, no significant median lobe.  Cystoscopy was performed and a 360 degree manner.  Large  capacity bladder.  No significant trabeculations or diverticula, normal-appearing bladder mucosa.  Bilateral orthotopic ureters.  The wire was noted in the bladder prior to withdrawing the scope. Finally a 22 Lao Abraham catheter was placed with assistance of a catheter guide.  There urine noted upon insertion of the catheter.  The catheter was irrigated confirming good position in the bladder. 30cc sterile water was placed in the balloon. He was then awakened from anesthesia without complications and transferred to the Recovery Room (RR). The patient arrived to the RR in stable condition and without complications.      Signed:  Radha Monteiro MD  08/28/24  16:45 EDT

## 2024-08-28 NOTE — DISCHARGE INSTRUCTIONS
DISCHARGE INSTRUCTIONS CYSTOSCOPY      For your surgery you had:  General anesthesia (you may have a sore throat for the first 24 hours)  You may experience dizziness, drowsiness, or lightheadedness for several hours following surgery.  Do not stay alone today or tonight.  Limit your activity for 24 hours.  You should not drive, operate machinery, drink alcohol, or sign legally binding documents for 24 hours or while you are taking pain medication.  Resume your diet slowly.  Follow any special dietary instructions you may have been given by your doctor.    NOTIFY YOUR DOCTOR IF YOU EXPERIENCE ANY OF THE FOLLOWING:  Temperature greater than 101 degrees Fahrenheit  Shaking Chills  Redness or excessive drainage from incision  Nausea, vomiting and/or pain that is not controlled by prescribed medications  Increase in bleeding or bleeding that is excessive  Unable to urinate in 6 hours after surgery  If unable to reach your doctor, please go to the closest Emergency Room     To encourage kidney and bladder function, you should drink as much fluid as possible.  Medications per physician instructions as indicated on Discharge Medication Information Sheet.    Last dose of pain medication given at:   Tylenol 1000 mg given at 2:38. Do not exceed 4000 mg in a 24 hour period.    SPECIAL INSTRUCTIONS:  Call tomorrow to make an appointment  with the urology nurse for an early morning void trial on Friday, 9/6/2024. [phone]

## 2024-08-29 ENCOUNTER — TELEPHONE (OUTPATIENT)
Dept: UROLOGY | Facility: CLINIC | Age: 79
End: 2024-08-29
Payer: MEDICARE

## 2024-08-29 NOTE — TELEPHONE ENCOUNTER
PT HAD PROCEDURE YESTERDAY AND WENT HOME WITH A CATH. DR BERNSTEIN TOLD HIM TO CALL  AND GET AN APPT TO GET THE CATHETER REMOVED ON 9/6. PLEASE CALL AND LET HIM KNOW A TIME.

## 2024-09-06 ENCOUNTER — CLINICAL SUPPORT (OUTPATIENT)
Dept: UROLOGY | Facility: CLINIC | Age: 79
End: 2024-09-06
Payer: MEDICARE

## 2024-09-06 VITALS — BODY MASS INDEX: 36.45 KG/M2 | RESPIRATION RATE: 16 BRPM | WEIGHT: 284 LBS | HEIGHT: 74 IN

## 2024-09-06 DIAGNOSIS — N35.914 STRICTURE OF ANTERIOR URETHRA IN MALE, UNSPECIFIED STRICTURE TYPE: Primary | ICD-10-CM

## 2024-09-06 RX ORDER — AMOXICILLIN 875 MG
TABLET ORAL
COMMUNITY
Start: 2024-08-29

## 2024-09-06 NOTE — PROGRESS NOTES
Patient here today to have his catheter removed.  Catheter came out without issues.  Patient tolerated well will double check with patient to make sure he is voiding.

## 2024-09-09 ENCOUNTER — READMISSION MANAGEMENT (OUTPATIENT)
Dept: CALL CENTER | Facility: HOSPITAL | Age: 79
End: 2024-09-09
Payer: MEDICARE

## 2024-09-09 NOTE — OUTREACH NOTE
Prep Survey      Flowsheet Row Responses   [facility] facility patient discharged from? Non-BH   Is LACE score < 7 ? Non-BH Discharge   Eligibility [facility]   Hospital [facility]   Date of Admission 09/07/24   Date of Discharge 09/09/24   Discharge Disposition Home or Self Care   Discharge diagnosis Sespis due to UTI   Does the patient have one of the following disease processes/diagnoses(primary or secondary)? Sepsis   Does the patient have Home health ordered? No   Prep survey completed? Yes            OG GARCES - Registered Nurse

## 2024-09-10 ENCOUNTER — TRANSITIONAL CARE MANAGEMENT TELEPHONE ENCOUNTER (OUTPATIENT)
Dept: CALL CENTER | Facility: HOSPITAL | Age: 79
End: 2024-09-10
Payer: MEDICARE

## 2024-09-10 NOTE — OUTREACH NOTE
Call Center TCM Note      Flowsheet Row Responses   [facility] patient discharged from? Non-BH  [[facility]]   Does the patient have one of the following disease processes/diagnoses(primary or secondary)? Sepsis   TCM attempt successful? Yes   Call start time 1311   Call end time 1314   Discharge diagnosis Sespis due to UTI   Meds reviewed with patient/caregiver? Yes   Is the patient taking all medications as directed (includes completed medication regime)? Yes   Does the patient have an appointment with their PCP within 7-14 days of discharge? Yes   Has home health visited the patient within 72 hours of discharge? N/A   Psychosocial issues? No   What is the patient's perception of their health status since discharge? Improving   Is the patient/caregiver able to teach back signs and symptoms related to disease process for when to call PCP? Yes   Is the patient/caregiver able to teach back signs and symptoms related to disease process for when to call 911? Yes   Is the patient/caregiver able to teach back the hierarchy of who to call/visit for symptoms/problems? PCP, Specialist, Home health nurse, Urgent Care, ED, 911 Yes   If the patient is a current smoker, are they able to teach back resources for cessation? Not a smoker   TCM call completed? Yes   Call end time 1314   Would this patient benefit from a Referral to Amb Social Work? No   Is the patient interested in additional calls from an ambulatory ? No            Bette Garcia LPN    9/10/2024, 13:18 EDT         DASI Score 5.81

## 2024-09-16 LAB
QT INTERVAL: 426 MS
QTC INTERVAL: 434 MS

## 2024-09-17 ENCOUNTER — OFFICE VISIT (OUTPATIENT)
Dept: INTERNAL MEDICINE | Age: 79
End: 2024-09-17
Payer: MEDICARE

## 2024-09-17 VITALS
TEMPERATURE: 98.2 F | HEART RATE: 82 BPM | BODY MASS INDEX: 35.94 KG/M2 | OXYGEN SATURATION: 98 % | HEIGHT: 74 IN | WEIGHT: 280 LBS | SYSTOLIC BLOOD PRESSURE: 100 MMHG | DIASTOLIC BLOOD PRESSURE: 61 MMHG

## 2024-09-17 DIAGNOSIS — I25.10 CORONARY ARTERY DISEASE INVOLVING NATIVE CORONARY ARTERY OF NATIVE HEART WITHOUT ANGINA PECTORIS: ICD-10-CM

## 2024-09-17 DIAGNOSIS — I50.32 CHRONIC DIASTOLIC (CONGESTIVE) HEART FAILURE: ICD-10-CM

## 2024-09-17 DIAGNOSIS — N17.0 SEPSIS WITH ACUTE RENAL FAILURE AND TUBULAR NECROSIS, DUE TO UNSPECIFIED ORGANISM, UNSPECIFIED WHETHER SEPTIC SHOCK PRESENT: Primary | ICD-10-CM

## 2024-09-17 DIAGNOSIS — N30.01 ACUTE CYSTITIS WITH HEMATURIA: ICD-10-CM

## 2024-09-17 DIAGNOSIS — N35.914 STRICTURE OF ANTERIOR URETHRA IN MALE, UNSPECIFIED STRICTURE TYPE: ICD-10-CM

## 2024-09-17 DIAGNOSIS — E78.2 MIXED HYPERLIPIDEMIA: ICD-10-CM

## 2024-09-17 DIAGNOSIS — E11.9 TYPE 2 DIABETES MELLITUS TREATED WITHOUT INSULIN: ICD-10-CM

## 2024-09-17 DIAGNOSIS — A41.9 SEPSIS WITH ACUTE RENAL FAILURE AND TUBULAR NECROSIS, DUE TO UNSPECIFIED ORGANISM, UNSPECIFIED WHETHER SEPTIC SHOCK PRESENT: Primary | ICD-10-CM

## 2024-09-17 DIAGNOSIS — R65.20 SEPSIS WITH ACUTE RENAL FAILURE AND TUBULAR NECROSIS, DUE TO UNSPECIFIED ORGANISM, UNSPECIFIED WHETHER SEPTIC SHOCK PRESENT: Primary | ICD-10-CM

## 2024-09-17 DIAGNOSIS — N17.9 ACUTE KIDNEY INJURY: ICD-10-CM

## 2024-09-17 DIAGNOSIS — I10 ESSENTIAL HYPERTENSION: ICD-10-CM

## 2024-09-17 PROCEDURE — 1126F AMNT PAIN NOTED NONE PRSNT: CPT | Performed by: INTERNAL MEDICINE

## 2024-09-17 PROCEDURE — 3074F SYST BP LT 130 MM HG: CPT | Performed by: INTERNAL MEDICINE

## 2024-09-17 PROCEDURE — 3078F DIAST BP <80 MM HG: CPT | Performed by: INTERNAL MEDICINE

## 2024-09-17 PROCEDURE — 99495 TRANSJ CARE MGMT MOD F2F 14D: CPT | Performed by: INTERNAL MEDICINE

## 2024-09-17 PROCEDURE — 1111F DSCHRG MED/CURRENT MED MERGE: CPT | Performed by: INTERNAL MEDICINE

## 2024-09-23 ENCOUNTER — OFFICE VISIT (OUTPATIENT)
Dept: UROLOGY | Facility: CLINIC | Age: 79
End: 2024-09-23
Payer: MEDICARE

## 2024-09-23 VITALS
HEART RATE: 77 BPM | HEIGHT: 74 IN | BODY MASS INDEX: 35.42 KG/M2 | SYSTOLIC BLOOD PRESSURE: 97 MMHG | WEIGHT: 276 LBS | DIASTOLIC BLOOD PRESSURE: 50 MMHG | OXYGEN SATURATION: 98 %

## 2024-09-23 DIAGNOSIS — R39.11 BENIGN PROSTATIC HYPERPLASIA WITH URINARY HESITANCY: Primary | ICD-10-CM

## 2024-09-23 DIAGNOSIS — N40.1 BENIGN PROSTATIC HYPERPLASIA WITH URINARY HESITANCY: Primary | ICD-10-CM

## 2024-09-23 LAB
BILIRUB BLD-MCNC: NEGATIVE MG/DL
CLARITY, POC: CLEAR
COLOR UR: YELLOW
EXPIRATION DATE: NORMAL
GLUCOSE UR STRIP-MCNC: NEGATIVE MG/DL
KETONES UR QL: NEGATIVE
LEUKOCYTE EST, POC: NEGATIVE
Lab: NORMAL
NITRITE UR-MCNC: NEGATIVE MG/ML
PH UR: 6 [PH] (ref 5–8)
PROT UR STRIP-MCNC: NEGATIVE MG/DL
RBC # UR STRIP: NEGATIVE /UL
SP GR UR: 1.01 (ref 1–1.03)
URINE VOLUME: 132
UROBILINOGEN UR QL: NORMAL

## 2024-09-23 PROCEDURE — 1160F RVW MEDS BY RX/DR IN RCRD: CPT | Performed by: NURSE PRACTITIONER

## 2024-09-23 PROCEDURE — 3074F SYST BP LT 130 MM HG: CPT | Performed by: NURSE PRACTITIONER

## 2024-09-23 PROCEDURE — 99212 OFFICE O/P EST SF 10 MIN: CPT | Performed by: NURSE PRACTITIONER

## 2024-09-23 PROCEDURE — 81003 URINALYSIS AUTO W/O SCOPE: CPT | Performed by: NURSE PRACTITIONER

## 2024-09-23 PROCEDURE — 1159F MED LIST DOCD IN RCRD: CPT | Performed by: NURSE PRACTITIONER

## 2024-09-23 PROCEDURE — 51798 US URINE CAPACITY MEASURE: CPT | Performed by: NURSE PRACTITIONER

## 2024-09-23 PROCEDURE — 3078F DIAST BP <80 MM HG: CPT | Performed by: NURSE PRACTITIONER

## 2024-10-07 ENCOUNTER — LAB (OUTPATIENT)
Dept: LAB | Facility: HOSPITAL | Age: 79
End: 2024-10-07
Payer: MEDICARE

## 2024-10-07 DIAGNOSIS — I10 ESSENTIAL HYPERTENSION: ICD-10-CM

## 2024-10-07 DIAGNOSIS — N13.8 BPH WITH OBSTRUCTION/LOWER URINARY TRACT SYMPTOMS: ICD-10-CM

## 2024-10-07 DIAGNOSIS — R06.09 DYSPNEA ON EXERTION: ICD-10-CM

## 2024-10-07 DIAGNOSIS — N40.1 BENIGN PROSTATIC HYPERPLASIA WITH URINARY FREQUENCY: ICD-10-CM

## 2024-10-07 DIAGNOSIS — A41.9 SEPSIS WITH ACUTE RENAL FAILURE AND TUBULAR NECROSIS, DUE TO UNSPECIFIED ORGANISM, UNSPECIFIED WHETHER SEPTIC SHOCK PRESENT: ICD-10-CM

## 2024-10-07 DIAGNOSIS — E55.9 VITAMIN D DEFICIENCY: ICD-10-CM

## 2024-10-07 DIAGNOSIS — N17.0 SEPSIS WITH ACUTE RENAL FAILURE AND TUBULAR NECROSIS, DUE TO UNSPECIFIED ORGANISM, UNSPECIFIED WHETHER SEPTIC SHOCK PRESENT: ICD-10-CM

## 2024-10-07 DIAGNOSIS — Z12.5 SCREENING PSA (PROSTATE SPECIFIC ANTIGEN): ICD-10-CM

## 2024-10-07 DIAGNOSIS — J30.89 OTHER ALLERGIC RHINITIS: ICD-10-CM

## 2024-10-07 DIAGNOSIS — N35.914 STRICTURE OF ANTERIOR URETHRA IN MALE, UNSPECIFIED STRICTURE TYPE: ICD-10-CM

## 2024-10-07 DIAGNOSIS — E78.2 MIXED HYPERLIPIDEMIA: ICD-10-CM

## 2024-10-07 DIAGNOSIS — R65.20 SEPSIS WITH ACUTE RENAL FAILURE AND TUBULAR NECROSIS, DUE TO UNSPECIFIED ORGANISM, UNSPECIFIED WHETHER SEPTIC SHOCK PRESENT: ICD-10-CM

## 2024-10-07 DIAGNOSIS — I50.32 CHRONIC DIASTOLIC (CONGESTIVE) HEART FAILURE: ICD-10-CM

## 2024-10-07 DIAGNOSIS — N30.01 ACUTE CYSTITIS WITH HEMATURIA: ICD-10-CM

## 2024-10-07 DIAGNOSIS — N40.1 BPH WITH OBSTRUCTION/LOWER URINARY TRACT SYMPTOMS: ICD-10-CM

## 2024-10-07 DIAGNOSIS — I25.10 CORONARY ARTERY DISEASE INVOLVING NATIVE CORONARY ARTERY OF NATIVE HEART WITHOUT ANGINA PECTORIS: ICD-10-CM

## 2024-10-07 DIAGNOSIS — E11.9 TYPE 2 DIABETES MELLITUS TREATED WITHOUT INSULIN: ICD-10-CM

## 2024-10-07 DIAGNOSIS — N17.9 ACUTE KIDNEY INJURY: ICD-10-CM

## 2024-10-07 DIAGNOSIS — Z00.00 MEDICARE ANNUAL WELLNESS VISIT, SUBSEQUENT: ICD-10-CM

## 2024-10-07 DIAGNOSIS — R35.0 BENIGN PROSTATIC HYPERPLASIA WITH URINARY FREQUENCY: ICD-10-CM

## 2024-10-07 LAB
ALBUMIN SERPL-MCNC: 4.1 G/DL (ref 3.5–5.2)
ALBUMIN/GLOB SERPL: 1.6 G/DL
ALP SERPL-CCNC: 73 U/L (ref 39–117)
ALT SERPL W P-5'-P-CCNC: 19 U/L (ref 1–41)
ANION GAP SERPL CALCULATED.3IONS-SCNC: 9.7 MMOL/L (ref 5–15)
AST SERPL-CCNC: 16 U/L (ref 1–40)
BASOPHILS # BLD AUTO: 0.03 10*3/MM3 (ref 0–0.2)
BASOPHILS NFR BLD AUTO: 0.4 % (ref 0–1.5)
BILIRUB SERPL-MCNC: 0.5 MG/DL (ref 0–1.2)
BUN SERPL-MCNC: 19 MG/DL (ref 8–23)
BUN/CREAT SERPL: 13.1 (ref 7–25)
CALCIUM SPEC-SCNC: 9.2 MG/DL (ref 8.6–10.5)
CHLORIDE SERPL-SCNC: 101 MMOL/L (ref 98–107)
CHOLEST SERPL-MCNC: 89 MG/DL (ref 0–200)
CO2 SERPL-SCNC: 28.3 MMOL/L (ref 22–29)
CREAT SERPL-MCNC: 1.45 MG/DL (ref 0.76–1.27)
DEPRECATED RDW RBC AUTO: 47.1 FL (ref 37–54)
EGFRCR SERPLBLD CKD-EPI 2021: 49.3 ML/MIN/1.73
EOSINOPHIL # BLD AUTO: 0.35 10*3/MM3 (ref 0–0.4)
EOSINOPHIL NFR BLD AUTO: 4.4 % (ref 0.3–6.2)
ERYTHROCYTE [DISTWIDTH] IN BLOOD BY AUTOMATED COUNT: 13 % (ref 12.3–15.4)
GLOBULIN UR ELPH-MCNC: 2.5 GM/DL
GLUCOSE SERPL-MCNC: 143 MG/DL (ref 65–99)
HBA1C MFR BLD: 6.4 % (ref 4.8–5.6)
HCT VFR BLD AUTO: 41.7 % (ref 37.5–51)
HDLC SERPL-MCNC: 31 MG/DL (ref 40–60)
HGB BLD-MCNC: 13.3 G/DL (ref 13–17.7)
IMM GRANULOCYTES # BLD AUTO: 0.01 10*3/MM3 (ref 0–0.05)
IMM GRANULOCYTES NFR BLD AUTO: 0.1 % (ref 0–0.5)
LDLC SERPL CALC-MCNC: 39 MG/DL (ref 0–100)
LDLC/HDLC SERPL: 1.23 {RATIO}
LYMPHOCYTES # BLD AUTO: 2.26 10*3/MM3 (ref 0.7–3.1)
LYMPHOCYTES NFR BLD AUTO: 28.6 % (ref 19.6–45.3)
MAGNESIUM SERPL-MCNC: 2.1 MG/DL (ref 1.6–2.4)
MCH RBC QN AUTO: 30.5 PG (ref 26.6–33)
MCHC RBC AUTO-ENTMCNC: 31.9 G/DL (ref 31.5–35.7)
MCV RBC AUTO: 95.6 FL (ref 79–97)
MONOCYTES # BLD AUTO: 0.87 10*3/MM3 (ref 0.1–0.9)
MONOCYTES NFR BLD AUTO: 11 % (ref 5–12)
NEUTROPHILS NFR BLD AUTO: 4.37 10*3/MM3 (ref 1.7–7)
NEUTROPHILS NFR BLD AUTO: 55.5 % (ref 42.7–76)
NT-PROBNP SERPL-MCNC: 180 PG/ML (ref 0–1800)
PLATELET # BLD AUTO: 150 10*3/MM3 (ref 140–450)
PMV BLD AUTO: 11.3 FL (ref 6–12)
POTASSIUM SERPL-SCNC: 4.9 MMOL/L (ref 3.5–5.2)
PROT SERPL-MCNC: 6.6 G/DL (ref 6–8.5)
RBC # BLD AUTO: 4.36 10*6/MM3 (ref 4.14–5.8)
SODIUM SERPL-SCNC: 139 MMOL/L (ref 136–145)
TRIGL SERPL-MCNC: 100 MG/DL (ref 0–150)
VLDLC SERPL-MCNC: 19 MG/DL (ref 5–40)
WBC NRBC COR # BLD AUTO: 7.89 10*3/MM3 (ref 3.4–10.8)

## 2024-10-07 PROCEDURE — 83036 HEMOGLOBIN GLYCOSYLATED A1C: CPT

## 2024-10-07 PROCEDURE — 80061 LIPID PANEL: CPT

## 2024-10-07 PROCEDURE — 83880 ASSAY OF NATRIURETIC PEPTIDE: CPT

## 2024-10-07 PROCEDURE — 83735 ASSAY OF MAGNESIUM: CPT

## 2024-10-07 PROCEDURE — 80053 COMPREHEN METABOLIC PANEL: CPT

## 2024-10-07 PROCEDURE — 36415 COLL VENOUS BLD VENIPUNCTURE: CPT

## 2024-10-14 ENCOUNTER — OFFICE VISIT (OUTPATIENT)
Dept: INTERNAL MEDICINE | Age: 79
End: 2024-10-14
Payer: MEDICARE

## 2024-10-14 VITALS
SYSTOLIC BLOOD PRESSURE: 140 MMHG | OXYGEN SATURATION: 94 % | HEART RATE: 68 BPM | TEMPERATURE: 98.2 F | BODY MASS INDEX: 35.16 KG/M2 | DIASTOLIC BLOOD PRESSURE: 70 MMHG | WEIGHT: 274 LBS | HEIGHT: 74 IN

## 2024-10-14 DIAGNOSIS — N13.8 BPH WITH OBSTRUCTION/LOWER URINARY TRACT SYMPTOMS: ICD-10-CM

## 2024-10-14 DIAGNOSIS — E11.9 TYPE 2 DIABETES MELLITUS TREATED WITHOUT INSULIN: Primary | ICD-10-CM

## 2024-10-14 DIAGNOSIS — Z12.5 SCREENING PSA (PROSTATE SPECIFIC ANTIGEN): ICD-10-CM

## 2024-10-14 DIAGNOSIS — I25.10 CORONARY ARTERY DISEASE INVOLVING NATIVE CORONARY ARTERY OF NATIVE HEART WITHOUT ANGINA PECTORIS: ICD-10-CM

## 2024-10-14 DIAGNOSIS — I10 ESSENTIAL HYPERTENSION: ICD-10-CM

## 2024-10-14 DIAGNOSIS — E55.9 VITAMIN D DEFICIENCY: ICD-10-CM

## 2024-10-14 DIAGNOSIS — R06.09 DYSPNEA ON EXERTION: ICD-10-CM

## 2024-10-14 DIAGNOSIS — N17.9 ACUTE KIDNEY INJURY: ICD-10-CM

## 2024-10-14 DIAGNOSIS — N40.1 BPH WITH OBSTRUCTION/LOWER URINARY TRACT SYMPTOMS: ICD-10-CM

## 2024-10-14 DIAGNOSIS — J43.8 OTHER EMPHYSEMA: ICD-10-CM

## 2024-10-14 DIAGNOSIS — E78.2 MIXED HYPERLIPIDEMIA: ICD-10-CM

## 2024-10-14 DIAGNOSIS — I50.32 CHRONIC DIASTOLIC (CONGESTIVE) HEART FAILURE: ICD-10-CM

## 2024-10-14 DIAGNOSIS — N18.31 STAGE 3A CHRONIC KIDNEY DISEASE: ICD-10-CM

## 2024-10-14 PROCEDURE — 99214 OFFICE O/P EST MOD 30 MIN: CPT | Performed by: INTERNAL MEDICINE

## 2024-10-14 PROCEDURE — 1126F AMNT PAIN NOTED NONE PRSNT: CPT | Performed by: INTERNAL MEDICINE

## 2024-10-14 PROCEDURE — G2211 COMPLEX E/M VISIT ADD ON: HCPCS | Performed by: INTERNAL MEDICINE

## 2024-10-14 PROCEDURE — 3078F DIAST BP <80 MM HG: CPT | Performed by: INTERNAL MEDICINE

## 2024-10-14 PROCEDURE — 3077F SYST BP >= 140 MM HG: CPT | Performed by: INTERNAL MEDICINE

## 2024-10-14 RX ORDER — TORSEMIDE 20 MG/1
20 TABLET ORAL EVERY OTHER DAY
Qty: 45 TABLET | Refills: 3 | Status: SHIPPED | OUTPATIENT
Start: 2024-10-14

## 2024-10-14 NOTE — PROGRESS NOTES
"CHIEF COMPLAINT/ HPI:  Hyperlipidemia (Routine follow up. Lab follow up. Decline flu shot today would like it later in the season.)      Hospital Follow Up Visit== September 17, 2024, ([facility] follow up 09/07. Pt has finished all ABX therapy finished. Feeling better. Asking if some medication can be stopped. Pt is feeling SOA when moving around, weakness and fatigue. )    Patient had a direct cystoscopy with urethrothomy, August 28, 2024 Dr. Radha Friedman--had catheter for 10 days post proccedure    Last Friday sept 6, 2024 , took catheter out ,, that night got sick chills fever, had to come back to the hospital September 7, 2024  , Went barge down hospital was diagnosed with acute UTI acute kidney injury shortness of breath, sepsis,, culture grew out Klebsiella pneumoniae, he was put on antibiotics and then Levaquin, and he was sent home with 7 more days and just finished that antibiotic recently        Objective   Vital Signs  Vitals:    10/14/24 1125   BP: 140/70   BP Location: Right arm   Patient Position: Sitting   Pulse: 68   Temp: 98.2 °F (36.8 °C)   SpO2: 94%   Weight: 124 kg (274 lb)   Height: 188 cm (74.02\")      Body mass index is 35.16 kg/m².  Review of Systems   Physical Exam   Result Review :   Lab Results   Component Value Date    PROBNP 180.0 10/07/2024    PROBNP 405.0 01/17/2024     CMP          2/13/2024    10:21 6/11/2024    11:43 10/7/2024    10:32   CMP   Glucose 133  168  143    BUN 26  19  19    Creatinine 1.39  1.32  1.45    EGFR 52.2  55.6  49.3    Sodium 138  139  139    Potassium 4.7  4.4  4.9    Chloride 101  102  101    Calcium 9.6  9.7  9.2    Total Protein 6.9  6.8  6.6    Albumin 4.0  4.2  4.1    Globulin 2.9  2.6  2.5    Total Bilirubin 0.5  0.5  0.5    Alkaline Phosphatase 62  69  73    AST (SGOT) 16  12  16    ALT (SGPT) 13  14  19    Albumin/Globulin Ratio 1.4  1.6  1.6    BUN/Creatinine Ratio 18.7  14.4  13.1    Anion Gap 11.1  12.4  9.7      CBC w/diff          " 2/13/2024    10:21 6/11/2024    11:43 10/7/2024    10:32   CBC w/Diff   WBC 7.19  6.31  7.89    RBC 4.36  4.45  4.36    Hemoglobin 13.2  13.6  13.3    Hematocrit 39.5  40.6  41.7    MCV 90.6  91.2  95.6    MCH 30.3  30.6  30.5    MCHC 33.4  33.5  31.9    RDW 12.4  12.3  13.0    Platelets 225  168  150    Neutrophil Rel % 54.9  66.2  55.5    Immature Granulocyte Rel % 0.8  0.5  0.1    Lymphocyte Rel % 27.8  21.6  28.6    Monocyte Rel % 12.7  8.7  11.0    Eosinophil Rel % 3.2  2.5  4.4    Basophil Rel % 0.6  0.5  0.4       Lipid Panel          1/17/2024    10:24 6/11/2024    11:43 10/7/2024    10:32   Lipid Panel   Total Cholesterol 101  89  89    Triglycerides 146  154  100    HDL Cholesterol 24  30  31    VLDL Cholesterol 26  26  19    LDL Cholesterol  51  33  39    LDL/HDL Ratio 1.99  0.94  1.23       Lab Results   Component Value Date    TSH 4.200 06/11/2024    TSH 1.220 01/17/2024    TSH 1.750 04/28/2023      Lab Results   Component Value Date    FREET4 1.11 06/11/2024    FREET4 1.09 01/17/2024      A1C Last 3 Results          2/13/2024    10:21 6/11/2024    11:43 10/7/2024    10:32   HGBA1C Last 3 Results   Hemoglobin A1C 7.80  6.60  6.40       PSA          1/17/2024    10:24   PSA   PSA 1.840                     Visit Diagnoses:    ICD-10-CM ICD-9-CM   1. Type 2 diabetes mellitus treated without insulin  E11.9 250.00   2. Other emphysema  J43.8 492.8   3. Screening PSA (prostate specific antigen)  Z12.5 V76.44   4. Chronic diastolic (congestive) heart failure  I50.32 428.32     428.0   5. Essential hypertension  I10 401.9   6. Coronary artery disease (moderate per cath but not stented)   I25.10 414.01   7. Dyspnea on exertion  R06.09 786.09   8. Vitamin D deficiency  E55.9 268.9   9. Mixed hyperlipidemia  E78.2 272.2   10. BPH with obstruction/lower urinary tract symptoms  N40.1 600.01    N13.8 599.69   11. Acute kidney injury  N17.9 584.9   12. Stage 3a chronic kidney disease  N18.31 585.3       Assessment and  Plan   Diagnoses and all orders for this visit:    1. Type 2 diabetes mellitus treated without insulin (Primary)  -     torsemide (DEMADEX) 20 MG tablet; Take 1 tablet by mouth Every Other Day.  Dispense: 45 tablet; Refill: 3  -     CBC & Differential; Future  -     Basic Metabolic Panel; Future  -     Protime-INR; Future  -     Magnesium; Future    2. Other emphysema  -     torsemide (DEMADEX) 20 MG tablet; Take 1 tablet by mouth Every Other Day.  Dispense: 45 tablet; Refill: 3  -     CBC & Differential; Future  -     Basic Metabolic Panel; Future  -     Protime-INR; Future  -     Magnesium; Future    3. Screening PSA (prostate specific antigen)  -     torsemide (DEMADEX) 20 MG tablet; Take 1 tablet by mouth Every Other Day.  Dispense: 45 tablet; Refill: 3  -     CBC & Differential; Future  -     Basic Metabolic Panel; Future  -     Protime-INR; Future  -     Magnesium; Future    4. Chronic diastolic (congestive) heart failure  -     torsemide (DEMADEX) 20 MG tablet; Take 1 tablet by mouth Every Other Day.  Dispense: 45 tablet; Refill: 3  -     CBC & Differential; Future  -     Basic Metabolic Panel; Future  -     Protime-INR; Future  -     Magnesium; Future    5. Essential hypertension  -     torsemide (DEMADEX) 20 MG tablet; Take 1 tablet by mouth Every Other Day.  Dispense: 45 tablet; Refill: 3  -     CBC & Differential; Future  -     Basic Metabolic Panel; Future  -     Protime-INR; Future  -     Magnesium; Future    6. Coronary artery disease (moderate per cath but not stented)   -     torsemide (DEMADEX) 20 MG tablet; Take 1 tablet by mouth Every Other Day.  Dispense: 45 tablet; Refill: 3  -     CBC & Differential; Future  -     Basic Metabolic Panel; Future  -     Protime-INR; Future  -     Magnesium; Future    7. Dyspnea on exertion  -     torsemide (DEMADEX) 20 MG tablet; Take 1 tablet by mouth Every Other Day.  Dispense: 45 tablet; Refill: 3  -     CBC & Differential; Future  -     Basic Metabolic Panel;  Future  -     Protime-INR; Future  -     Magnesium; Future    8. Vitamin D deficiency  -     torsemide (DEMADEX) 20 MG tablet; Take 1 tablet by mouth Every Other Day.  Dispense: 45 tablet; Refill: 3  -     CBC & Differential; Future  -     Basic Metabolic Panel; Future  -     Protime-INR; Future  -     Magnesium; Future    9. Mixed hyperlipidemia  -     torsemide (DEMADEX) 20 MG tablet; Take 1 tablet by mouth Every Other Day.  Dispense: 45 tablet; Refill: 3  -     CBC & Differential; Future  -     Basic Metabolic Panel; Future  -     Protime-INR; Future  -     Magnesium; Future    10. BPH with obstruction/lower urinary tract symptoms  -     torsemide (DEMADEX) 20 MG tablet; Take 1 tablet by mouth Every Other Day.  Dispense: 45 tablet; Refill: 3  -     CBC & Differential; Future  -     Basic Metabolic Panel; Future  -     Protime-INR; Future  -     Magnesium; Future    11. Acute kidney injury  -     torsemide (DEMADEX) 20 MG tablet; Take 1 tablet by mouth Every Other Day.  Dispense: 45 tablet; Refill: 3  -     CBC & Differential; Future  -     Basic Metabolic Panel; Future  -     Protime-INR; Future  -     Magnesium; Future    12. Stage 3a chronic kidney disease  -     torsemide (DEMADEX) 20 MG tablet; Take 1 tablet by mouth Every Other Day.  Dispense: 45 tablet; Refill: 3  -     CBC & Differential; Future  -     Basic Metabolic Panel; Future  -     Protime-INR; Future  -     Magnesium; Future        Patient underwent a cystoscopy with ureterotomy August 28, 2024 post procedure had a catheter in for about 10 days when they took that out he developed chills sepsis fever and UTI with acute kidney injury had to go back in the hospital with IV antibiotics and was sent home on oral Levaquin and just finished that course around September 16, 2024, resolved as of October 14, 2024    Annual wellness visit questionnaire completed June 11, 2024,    Shortness of breath upper back and neck and chest tightness, === for stress  test echocardiogram around November 1, 2024, patient had labs October 7, 2024 proBNP 180, continues hydrochlorothiazide 25 mg daily     Hematuria/ dysuria --patient does follow-up with Dr. Santana urology,  CT abdomen and pelvis March 4, 2024,, mild bladder wall thickening and haziness of the perivesicular fat suggesting possible cystitis there is some haziness around the prostate gland suggesting possible prostatitis there was some renal and hepatic cysts colonic diverticulosis a slight irregularity to the abdominal aorta 2.9 cm but no kidney stones or solid renal masses-----had cystoscopy April 18, 2024    Ckd 3a     Cad, cath, --no stents, med mgt. Dr harvey, ---cont ranexa 500 mg bid , crestor 20 mg qd , coq -10     Copd--ct chest low dose , normal march 2023,,, CT chest low-dose screening April 19, 2024 shows mild emphysema no active airways disease aortic and severe calcified coronary atherosclerotic disease otherwise negative, recommend 1 year follow-up --------------------------------continues trelegy daily , prn albuterol hfa,   ----chest x-ray January 15, 2024 shows blunting posterior costophrenic sulcus on the left, shadows in the lower lungs could relate to cardiogenic cardiophrenic fat pads degenerative changes of the dorsal spine otherwise    Lower extremity edema 2-3+,, proBNP level 180 October 7, 2024--- will add torsemide 20 mg every other day to his current regimen below, to help with edema discussed October 14, 2024    Pulm htn --cont ?      Htn cont telmisartan 80 mg qd , hctz 25 mg qd     Bph, cont flomax 0.8 mg qhs --does see urology    Dm 2 , cont glipizide xl 10 mg qd, metformin extended release 500 mg daily ==hga1c = 6.4 October 7, 2024, urine microalbumin was - neg ---January 17, 2024-    Overwgt , ? S off steroids   now ,      Vit d def mild jan 2024     Psa 1.8, jan 2024                 Follow Up   Return in about 4 months (around 2/14/2025).  Patient was given instructions and counseling  regarding his condition or for health maintenance advice. Please see specific information pulled into the AVS if appropriate.           Answers submitted by the patient for this visit:  Other (Submitted on 10/7/2024)  Please describe your symptoms.: Follow up  Have you had these symptoms before?: No  How long have you been having these symptoms?: 1-2 weeks  Primary Reason for Visit (Submitted on 10/7/2024)  What is the primary reason for your visit?: Problem Not Listed

## 2024-10-24 DIAGNOSIS — E11.9 TYPE 2 DIABETES MELLITUS WITHOUT COMPLICATION, WITHOUT LONG-TERM CURRENT USE OF INSULIN: ICD-10-CM

## 2024-10-24 RX ORDER — GLIPIZIDE 10 MG/1
TABLET, FILM COATED, EXTENDED RELEASE ORAL
Qty: 90 TABLET | Refills: 3 | OUTPATIENT
Start: 2024-10-24

## 2024-11-01 ENCOUNTER — HOSPITAL ENCOUNTER (OUTPATIENT)
Dept: CARDIOLOGY | Facility: HOSPITAL | Age: 79
Discharge: HOME | End: 2024-11-01
Payer: MEDICARE

## 2024-11-01 ENCOUNTER — HOSPITAL ENCOUNTER (OUTPATIENT)
Dept: NUCLEAR MEDICINE | Facility: HOSPITAL | Age: 79
Discharge: HOME | End: 2024-11-01
Payer: MEDICARE

## 2024-11-01 DIAGNOSIS — I50.32 CHRONIC DIASTOLIC (CONGESTIVE) HEART FAILURE: ICD-10-CM

## 2024-11-01 DIAGNOSIS — I25.10 CORONARY ARTERY DISEASE INVOLVING NATIVE CORONARY ARTERY OF NATIVE HEART WITHOUT ANGINA PECTORIS: ICD-10-CM

## 2024-11-01 LAB
AORTIC DIMENSIONLESS INDEX: 0.67 (DI)
ASCENDING AORTA: 3.7 CM
BH CV ECHO MEAS - AO MAX PG: 6.5 MMHG
BH CV ECHO MEAS - AO MEAN PG: 3 MMHG
BH CV ECHO MEAS - AO ROOT DIAM: 3.2 CM
BH CV ECHO MEAS - AO V2 MAX: 127 CM/SEC
BH CV ECHO MEAS - AO V2 VTI: 27.3 CM
BH CV ECHO MEAS - AVA(I,D): 2.32 CM2
BH CV ECHO MEAS - EDV(CUBED): 68.9 ML
BH CV ECHO MEAS - EDV(MOD-SP2): 72.6 ML
BH CV ECHO MEAS - EDV(MOD-SP4): 73.3 ML
BH CV ECHO MEAS - EF(MOD-BP): 60.1 %
BH CV ECHO MEAS - EF(MOD-SP2): 61.8 %
BH CV ECHO MEAS - EF(MOD-SP4): 60.7 %
BH CV ECHO MEAS - ESV(CUBED): 17.6 ML
BH CV ECHO MEAS - ESV(MOD-SP2): 27.7 ML
BH CV ECHO MEAS - ESV(MOD-SP4): 28.8 ML
BH CV ECHO MEAS - FS: 36.6 %
BH CV ECHO MEAS - IVS/LVPW: 1.23 CM
BH CV ECHO MEAS - IVSD: 1.6 CM
BH CV ECHO MEAS - LA DIMENSION: 4.1 CM
BH CV ECHO MEAS - LAT PEAK E' VEL: 8.9 CM/SEC
BH CV ECHO MEAS - LV MASS(C)D: 228.6 GRAMS
BH CV ECHO MEAS - LV MAX PG: 2.7 MMHG
BH CV ECHO MEAS - LV MEAN PG: 1 MMHG
BH CV ECHO MEAS - LV V1 MAX: 81.8 CM/SEC
BH CV ECHO MEAS - LV V1 VTI: 18.3 CM
BH CV ECHO MEAS - LVIDD: 4.1 CM
BH CV ECHO MEAS - LVIDS: 2.6 CM
BH CV ECHO MEAS - LVOT AREA: 3.5 CM2
BH CV ECHO MEAS - LVOT DIAM: 2.1 CM
BH CV ECHO MEAS - LVPWD: 1.3 CM
BH CV ECHO MEAS - MED PEAK E' VEL: 6.5 CM/SEC
BH CV ECHO MEAS - MV A MAX VEL: 74.9 CM/SEC
BH CV ECHO MEAS - MV DEC SLOPE: 323 CM/SEC2
BH CV ECHO MEAS - MV DEC TIME: 0.31 SEC
BH CV ECHO MEAS - MV E MAX VEL: 58.2 CM/SEC
BH CV ECHO MEAS - MV E/A: 0.78
BH CV ECHO MEAS - MV MEAN PG: 1 MMHG
BH CV ECHO MEAS - MV P1/2T: 73.7 MSEC
BH CV ECHO MEAS - MV V2 VTI: 30 CM
BH CV ECHO MEAS - MVA(P1/2T): 3 CM2
BH CV ECHO MEAS - MVA(VTI): 2.11 CM2
BH CV ECHO MEAS - PULM A REVS DUR: 0.13 SEC
BH CV ECHO MEAS - PULM A REVS VEL: 34.7 CM/SEC
BH CV ECHO MEAS - PULM DIAS VEL: 32.7 CM/SEC
BH CV ECHO MEAS - PULM S/D: 1.31
BH CV ECHO MEAS - PULM SYS VEL: 42.8 CM/SEC
BH CV ECHO MEAS - RAP SYSTOLE: 5 MMHG
BH CV ECHO MEAS - RVDD: 3.8 CM
BH CV ECHO MEAS - SV(LVOT): 63.4 ML
BH CV ECHO MEAS - SV(MOD-SP2): 44.9 ML
BH CV ECHO MEAS - SV(MOD-SP4): 44.5 ML
BH CV ECHO MEAS - TAPSE (>1.6): 1.65 CM
BH CV ECHO MEASUREMENTS AVERAGE E/E' RATIO: 7.56
BH CV XLRA - TDI S': 11.9 CM/SEC
IVRT: 61 MS
LEFT ATRIUM VOLUME INDEX: 21.3 ML/M2

## 2024-11-01 PROCEDURE — 25010000002 REGADENOSON 0.4 MG/5ML SOLUTION: Performed by: INTERNAL MEDICINE

## 2024-11-01 PROCEDURE — 78452 HT MUSCLE IMAGE SPECT MULT: CPT

## 2024-11-01 PROCEDURE — 93017 CV STRESS TEST TRACING ONLY: CPT

## 2024-11-01 PROCEDURE — 0 TECHNETIUM SESTAMIBI: Performed by: INTERNAL MEDICINE

## 2024-11-01 PROCEDURE — A9500 TC99M SESTAMIBI: HCPCS | Performed by: INTERNAL MEDICINE

## 2024-11-01 PROCEDURE — 93306 TTE W/DOPPLER COMPLETE: CPT

## 2024-11-01 RX ORDER — REGADENOSON 0.08 MG/ML
0.4 INJECTION, SOLUTION INTRAVENOUS
Status: COMPLETED | OUTPATIENT
Start: 2024-11-01 | End: 2024-11-01

## 2024-11-01 RX ADMIN — TECHNETIUM TC 99M SESTAMIBI 1 DOSE: 1 INJECTION INTRAVENOUS at 10:05

## 2024-11-01 RX ADMIN — TECHNETIUM TC 99M SESTAMIBI 1 DOSE: 1 INJECTION INTRAVENOUS at 11:00

## 2024-11-01 RX ADMIN — REGADENOSON 0.4 MG: 0.08 INJECTION, SOLUTION INTRAVENOUS at 11:00

## 2024-11-04 ENCOUNTER — TELEPHONE (OUTPATIENT)
Dept: INTERNAL MEDICINE | Age: 79
End: 2024-11-04
Payer: MEDICARE

## 2024-11-04 LAB
BH CV IMMEDIATE POST RECOVERY TECH DATA SYMPTOMS: NORMAL
BH CV IMMEDIATE POST TECH DATA BLOOD PRESSURE: NORMAL MMHG
BH CV IMMEDIATE POST TECH DATA HEART RATE: 77 BPM
BH CV IMMEDIATE POST TECH DATA OXYGEN SATS: 98 %
BH CV REST NUCLEAR ISOTOPE DOSE: 9 MCI
BH CV SIX MINUTE RECOVERY TECH DATA BLOOD PRESSURE: NORMAL
BH CV SIX MINUTE RECOVERY TECH DATA HEART RATE: 67 BPM
BH CV SIX MINUTE RECOVERY TECH DATA OXYGEN SATURATION: 98 %
BH CV SIX MINUTE RECOVERY TECH DATA SYMPTOMS: NORMAL
BH CV STRESS BP STAGE 1: NORMAL
BH CV STRESS COMMENTS STAGE 1: NORMAL
BH CV STRESS DOSE REGADENOSON STAGE 1: 0.4
BH CV STRESS DURATION MIN STAGE 1: 0
BH CV STRESS DURATION SEC STAGE 1: 10
BH CV STRESS HR STAGE 1: 71
BH CV STRESS NUCLEAR ISOTOPE DOSE: 32.3 MCI
BH CV STRESS O2 STAGE 1: 98
BH CV STRESS PROTOCOL 1: NORMAL
BH CV STRESS RECOVERY BP: NORMAL MMHG
BH CV STRESS RECOVERY HR: 67 BPM
BH CV STRESS RECOVERY O2: 98 %
BH CV STRESS STAGE 1: 1
BH CV THREE MINUTE POST TECH DATA BLOOD PRESSURE: NORMAL MMHG
BH CV THREE MINUTE POST TECH DATA HEART RATE: 70 BPM
BH CV THREE MINUTE POST TECH DATA OXYGEN SATURATION: 98 %
BH CV THREE MINUTE RECOVERY TECH DATA SYMPTOM: NORMAL
LV EF NUC BP: 56 %
MAXIMAL PREDICTED HEART RATE: 142 BPM
PERCENT MAX PREDICTED HR: 50 %
STRESS BASELINE BP: NORMAL MMHG
STRESS BASELINE HR: 59 BPM
STRESS O2 SAT REST: 96 %
STRESS PERCENT HR: 59 %
STRESS POST O2 SAT PEAK: 98 %
STRESS POST PEAK BP: NORMAL MMHG
STRESS POST PEAK HR: 71 BPM
STRESS TARGET HR: 121 BPM

## 2024-11-04 NOTE — TELEPHONE ENCOUNTER
HUB may relay message    ----- Message from César Kumar sent at 11/1/2024 10:05 PM EDT -----  Call pt with normal muscle function, no significant Valve abnormalities

## 2024-11-11 DIAGNOSIS — E11.9 TYPE 2 DIABETES MELLITUS WITHOUT COMPLICATION, WITHOUT LONG-TERM CURRENT USE OF INSULIN: ICD-10-CM

## 2024-11-11 RX ORDER — GLIPIZIDE 10 MG/1
TABLET, FILM COATED, EXTENDED RELEASE ORAL
Qty: 90 TABLET | Refills: 3 | OUTPATIENT
Start: 2024-11-11

## 2025-01-23 ENCOUNTER — OFFICE VISIT (OUTPATIENT)
Dept: PULMONOLOGY | Facility: CLINIC | Age: 80
End: 2025-01-23
Payer: MEDICARE

## 2025-01-23 VITALS
TEMPERATURE: 98.4 F | HEART RATE: 69 BPM | SYSTOLIC BLOOD PRESSURE: 131 MMHG | HEIGHT: 74 IN | OXYGEN SATURATION: 99 % | WEIGHT: 277 LBS | RESPIRATION RATE: 16 BRPM | BODY MASS INDEX: 35.55 KG/M2 | DIASTOLIC BLOOD PRESSURE: 65 MMHG

## 2025-01-23 DIAGNOSIS — J44.1 COPD WITH ACUTE EXACERBATION: Primary | ICD-10-CM

## 2025-01-23 DIAGNOSIS — F17.201 TOBACCO ABUSE, IN REMISSION: ICD-10-CM

## 2025-01-23 DIAGNOSIS — E66.01 MORBID (SEVERE) OBESITY DUE TO EXCESS CALORIES: ICD-10-CM

## 2025-01-23 RX ORDER — PREDNISONE 10 MG/1
TABLET ORAL
Qty: 31 TABLET | Refills: 0 | Status: SHIPPED | OUTPATIENT
Start: 2025-01-23

## 2025-01-23 RX ORDER — GUAIFENESIN 600 MG/1
1200 TABLET, EXTENDED RELEASE ORAL 2 TIMES DAILY
Qty: 30 TABLET | Refills: 3 | Status: SHIPPED | OUTPATIENT
Start: 2025-01-23

## 2025-01-23 RX ORDER — AMOXICILLIN 500 MG/1
1000 CAPSULE ORAL 2 TIMES DAILY
Qty: 14 CAPSULE | Refills: 0 | Status: SHIPPED | OUTPATIENT
Start: 2025-01-23 | End: 2025-01-30

## 2025-01-23 NOTE — PROGRESS NOTES
Pulmonary Office Follow-up    Subjective     Kennedy Lin is seen today at the office for   Chief Complaint   Patient presents with    Follow-up    COPD    Shortness of Breath    Emphysema         HPI  Kennedy Lin is a 79 y.o. male with a PMH significant for COPD and tobacco abuse presents for follow-up patient complains of breathlessness along with cough wheeze and chest congestion he has also been having some nasal congestion and postnasal drip patient has already quit smoking and he takes Trelegy      Tobacco use history:  Former smoker      Patient Active Problem List   Diagnosis    Essential hypertension    Type 2 diabetes mellitus treated without insulin    Smokeless tobacco use    Medicare annual wellness visit, subsequent    Coronary artery disease (moderate per cath but not stented)     High cholesterol    Other emphysema    Other allergic rhinitis    Colonoscopy declined (Cologuard ordered in 2023)     BPH with obstruction/lower urinary tract symptoms    Mixed hyperlipidemia    Benign prostatic hyperplasia with lower urinary tract symptoms    Annual physical exam    Screening PSA (prostate specific antigen)    Elevated PSA    Vitamin D deficiency    Skin lesion of back    Dyspnea on exertion    Leukocytosis    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    All other systems reviewed and are negative.    As described in the HPI. Otherwise, remainder of ROS (14 systems) were negative.    Medications, Allergies, Social, and Family Histories reviewed as per EMR.    Result Review :            Objective     Vitals:    01/23/25 1052   BP: 131/65   Pulse: 69   Resp: 16   Temp: 98.4 °F (36.9 °C)   SpO2:  99%         01/23/25  1052   Weight: 126 kg (277 lb)       Physical Exam  Vitals and nursing note reviewed.   Constitutional:       Appearance: He is obese. He is ill-appearing.   HENT:      Head: Normocephalic and atraumatic.      Nose: Congestion and rhinorrhea present.      Mouth/Throat:      Mouth: Mucous membranes are moist.      Pharynx: Oropharynx is clear.   Eyes:      Extraocular Movements: Extraocular movements intact.      Conjunctiva/sclera: Conjunctivae normal.      Pupils: Pupils are equal, round, and reactive to light.   Cardiovascular:      Rate and Rhythm: Normal rate and regular rhythm.      Pulses: Normal pulses.      Heart sounds: Normal heart sounds.   Pulmonary:      Effort: Pulmonary effort is normal.      Breath sounds: Wheezing and rhonchi present.   Musculoskeletal:         General: Normal range of motion.      Cervical back: Normal range of motion and neck supple.   Skin:     General: Skin is warm.      Capillary Refill: Capillary refill takes 2 to 3 seconds.   Neurological:      Mental Status: He is alert and oriented to person, place, and time.   Psychiatric:         Mood and Affect: Mood normal.         Behavior: Behavior normal.         No radiology results for the last 90 days.     Assessment & Plan     Diagnoses and all orders for this visit:    1. COPD with acute exacerbation (Primary)    2. Morbid (severe) obesity due to excess calories    3. Tobacco abuse, in remission    Other orders  -     amoxicillin (AMOXIL) 500 MG capsule; Take 2 capsules by mouth 2 (Two) Times a Day for 7 days.  Dispense: 14 capsule; Refill: 0  -     predniSONE (DELTASONE) 10 MG tablet; Take 4 tabs daily x 3 days, then take 3 tabs daily x 3 days, then take 2 tabs daily x 3 days, then take 1 tab daily x 3 days  Dispense: 31 tablet; Refill: 0  -     guaiFENesin (Mucinex) 600 MG 12 hr tablet; Take 2 tablets by mouth 2 (Two) Times a Day.  Dispense: 30 tablet; Refill: 3         Discussion/ Recommendations:   We  will place him on Amoxil and prednisone for 1 week  Mucinex twice daily  Trelegy once daily  Albuterol inhaler 2 puffs every 6 hours as needed  Vaccinations discussed and recommended             Return in about 6 months (around 7/23/2025).          This document has been electronically signed by Moe Ko MD on January 23, 2025 11:00 EST

## 2025-01-30 RX ORDER — METFORMIN HYDROCHLORIDE 500 MG/1
500 TABLET, EXTENDED RELEASE ORAL
Qty: 90 TABLET | Refills: 3 | Status: SHIPPED | OUTPATIENT
Start: 2025-01-30

## 2025-02-03 ENCOUNTER — HOSPITAL ENCOUNTER (EMERGENCY)
Facility: HOSPITAL | Age: 80
Discharge: HOME | End: 2025-02-03
Attending: EMERGENCY MEDICINE | Admitting: EMERGENCY MEDICINE
Payer: MEDICARE

## 2025-02-03 ENCOUNTER — APPOINTMENT (OUTPATIENT)
Dept: GENERAL RADIOLOGY | Facility: HOSPITAL | Age: 80
End: 2025-02-03
Payer: MEDICARE

## 2025-02-03 ENCOUNTER — OFFICE VISIT (OUTPATIENT)
Dept: INTERNAL MEDICINE | Age: 80
End: 2025-02-03
Payer: MEDICARE

## 2025-02-03 VITALS
DIASTOLIC BLOOD PRESSURE: 55 MMHG | TEMPERATURE: 98 F | HEIGHT: 74 IN | BODY MASS INDEX: 33.06 KG/M2 | WEIGHT: 257.6 LBS | SYSTOLIC BLOOD PRESSURE: 72 MMHG | RESPIRATION RATE: 16 BRPM

## 2025-02-03 VITALS
TEMPERATURE: 98 F | OXYGEN SATURATION: 95 % | HEART RATE: 66 BPM | RESPIRATION RATE: 20 BRPM | BODY MASS INDEX: 34.35 KG/M2 | HEIGHT: 74 IN | DIASTOLIC BLOOD PRESSURE: 61 MMHG | WEIGHT: 267.64 LBS | SYSTOLIC BLOOD PRESSURE: 119 MMHG

## 2025-02-03 DIAGNOSIS — J10.1 INFLUENZA A: Primary | ICD-10-CM

## 2025-02-03 DIAGNOSIS — R50.9 FEVER, UNSPECIFIED FEVER CAUSE: ICD-10-CM

## 2025-02-03 DIAGNOSIS — R05.1 ACUTE COUGH: ICD-10-CM

## 2025-02-03 DIAGNOSIS — N39.0 ACUTE URINARY TRACT INFECTION: ICD-10-CM

## 2025-02-03 DIAGNOSIS — I95.9 HYPOTENSION, UNSPECIFIED HYPOTENSION TYPE: ICD-10-CM

## 2025-02-03 DIAGNOSIS — J43.8 OTHER EMPHYSEMA: ICD-10-CM

## 2025-02-03 LAB
ALBUMIN SERPL-MCNC: 3.7 G/DL (ref 3.5–5.2)
ALBUMIN/GLOB SERPL: 1.2 G/DL
ALP SERPL-CCNC: 63 U/L (ref 39–117)
ALT SERPL W P-5'-P-CCNC: 22 U/L (ref 1–41)
ANION GAP SERPL CALCULATED.3IONS-SCNC: 12.7 MMOL/L (ref 5–15)
AST SERPL-CCNC: 11 U/L (ref 1–40)
BACTERIA UR QL AUTO: ABNORMAL /HPF
BASOPHILS # BLD AUTO: 0.07 10*3/MM3 (ref 0–0.2)
BASOPHILS NFR BLD AUTO: 0.6 % (ref 0–1.5)
BILIRUB SERPL-MCNC: 1 MG/DL (ref 0–1.2)
BILIRUB UR QL STRIP: ABNORMAL
BUN SERPL-MCNC: 35 MG/DL (ref 8–23)
BUN/CREAT SERPL: 13.9 (ref 7–25)
CALCIUM SPEC-SCNC: 9.2 MG/DL (ref 8.6–10.5)
CHLORIDE SERPL-SCNC: 96 MMOL/L (ref 98–107)
CLARITY UR: ABNORMAL
CO2 SERPL-SCNC: 25.3 MMOL/L (ref 22–29)
COLOR UR: ABNORMAL
CREAT SERPL-MCNC: 2.51 MG/DL (ref 0.76–1.27)
DEPRECATED RDW RBC AUTO: 44 FL (ref 37–54)
EGFRCR SERPLBLD CKD-EPI 2021: 25.5 ML/MIN/1.73
EOSINOPHIL # BLD AUTO: 0.05 10*3/MM3 (ref 0–0.4)
EOSINOPHIL NFR BLD AUTO: 0.4 % (ref 0.3–6.2)
ERYTHROCYTE [DISTWIDTH] IN BLOOD BY AUTOMATED COUNT: 13.1 % (ref 12.3–15.4)
EXPIRATION DATE: ABNORMAL
FLUAV AG UPPER RESP QL IA.RAPID: DETECTED
FLUBV AG UPPER RESP QL IA.RAPID: NOT DETECTED
GEN 5 1HR TROPONIN T REFLEX: 29 NG/L
GLOBULIN UR ELPH-MCNC: 3.1 GM/DL
GLUCOSE SERPL-MCNC: 253 MG/DL (ref 65–99)
GLUCOSE UR STRIP-MCNC: ABNORMAL MG/DL
HCT VFR BLD AUTO: 42.1 % (ref 37.5–51)
HGB BLD-MCNC: 14.1 G/DL (ref 13–17.7)
HGB UR QL STRIP.AUTO: NEGATIVE
HOLD SPECIMEN: NORMAL
HOLD SPECIMEN: NORMAL
HYALINE CASTS UR QL AUTO: ABNORMAL /LPF
IMM GRANULOCYTES # BLD AUTO: 0.23 10*3/MM3 (ref 0–0.05)
IMM GRANULOCYTES NFR BLD AUTO: 1.9 % (ref 0–0.5)
INTERNAL CONTROL: ABNORMAL
KETONES UR QL STRIP: NEGATIVE
LEUKOCYTE ESTERASE UR QL STRIP.AUTO: ABNORMAL
LYMPHOCYTES # BLD AUTO: 1.4 10*3/MM3 (ref 0.7–3.1)
LYMPHOCYTES NFR BLD AUTO: 11.8 % (ref 19.6–45.3)
Lab: ABNORMAL
MAGNESIUM SERPL-MCNC: 2.2 MG/DL (ref 1.6–2.4)
MCH RBC QN AUTO: 31 PG (ref 26.6–33)
MCHC RBC AUTO-ENTMCNC: 33.5 G/DL (ref 31.5–35.7)
MCV RBC AUTO: 92.5 FL (ref 79–97)
MONOCYTES # BLD AUTO: 1.55 10*3/MM3 (ref 0.1–0.9)
MONOCYTES NFR BLD AUTO: 13.1 % (ref 5–12)
NEUTROPHILS NFR BLD AUTO: 72.2 % (ref 42.7–76)
NEUTROPHILS NFR BLD AUTO: 8.55 10*3/MM3 (ref 1.7–7)
NITRITE UR QL STRIP: POSITIVE
NRBC BLD AUTO-RTO: 0 /100 WBC (ref 0–0.2)
PH UR STRIP.AUTO: <=5 [PH] (ref 5–8)
PLATELET # BLD AUTO: 215 10*3/MM3 (ref 140–450)
PMV BLD AUTO: 10.7 FL (ref 6–12)
POTASSIUM SERPL-SCNC: 4.3 MMOL/L (ref 3.5–5.2)
PROT SERPL-MCNC: 6.8 G/DL (ref 6–8.5)
PROT UR QL STRIP: ABNORMAL
QT INTERVAL: 378 MS
QTC INTERVAL: 428 MS
RBC # BLD AUTO: 4.55 10*6/MM3 (ref 4.14–5.8)
RBC # UR STRIP: ABNORMAL /HPF
REF LAB TEST METHOD: ABNORMAL
SARS-COV-2 AG UPPER RESP QL IA.RAPID: NOT DETECTED
SODIUM SERPL-SCNC: 134 MMOL/L (ref 136–145)
SP GR UR STRIP: 1.02 (ref 1–1.03)
SQUAMOUS #/AREA URNS HPF: ABNORMAL /HPF
TROPONIN T % DELTA: -15
TROPONIN T NUMERIC DELTA: -5 NG/L
TROPONIN T SERPL HS-MCNC: 34 NG/L
UROBILINOGEN UR QL STRIP: ABNORMAL
WBC # UR STRIP: ABNORMAL /HPF
WBC NRBC COR # BLD AUTO: 11.85 10*3/MM3 (ref 3.4–10.8)
WHOLE BLOOD HOLD COAG: NORMAL
WHOLE BLOOD HOLD SPECIMEN: NORMAL

## 2025-02-03 PROCEDURE — 25810000003 SODIUM CHLORIDE 0.9 % SOLUTION: Performed by: EMERGENCY MEDICINE

## 2025-02-03 PROCEDURE — 1159F MED LIST DOCD IN RCRD: CPT | Performed by: NURSE PRACTITIONER

## 2025-02-03 PROCEDURE — 87428 SARSCOV & INF VIR A&B AG IA: CPT | Performed by: NURSE PRACTITIONER

## 2025-02-03 PROCEDURE — 36415 COLL VENOUS BLD VENIPUNCTURE: CPT | Performed by: EMERGENCY MEDICINE

## 2025-02-03 PROCEDURE — 25010000002 ONDANSETRON PER 1 MG: Performed by: EMERGENCY MEDICINE

## 2025-02-03 PROCEDURE — 71045 X-RAY EXAM CHEST 1 VIEW: CPT

## 2025-02-03 PROCEDURE — 81001 URINALYSIS AUTO W/SCOPE: CPT | Performed by: EMERGENCY MEDICINE

## 2025-02-03 PROCEDURE — 85025 COMPLETE CBC W/AUTO DIFF WBC: CPT | Performed by: EMERGENCY MEDICINE

## 2025-02-03 PROCEDURE — 25810000003 SODIUM CHLORIDE 0.9 % SOLUTION

## 2025-02-03 PROCEDURE — 1160F RVW MEDS BY RX/DR IN RCRD: CPT | Performed by: NURSE PRACTITIONER

## 2025-02-03 PROCEDURE — 1126F AMNT PAIN NOTED NONE PRSNT: CPT | Performed by: NURSE PRACTITIONER

## 2025-02-03 PROCEDURE — 84484 ASSAY OF TROPONIN QUANT: CPT | Performed by: EMERGENCY MEDICINE

## 2025-02-03 PROCEDURE — 3074F SYST BP LT 130 MM HG: CPT | Performed by: NURSE PRACTITIONER

## 2025-02-03 PROCEDURE — 83735 ASSAY OF MAGNESIUM: CPT | Performed by: EMERGENCY MEDICINE

## 2025-02-03 PROCEDURE — 80053 COMPREHEN METABOLIC PANEL: CPT | Performed by: EMERGENCY MEDICINE

## 2025-02-03 PROCEDURE — 99214 OFFICE O/P EST MOD 30 MIN: CPT | Performed by: NURSE PRACTITIONER

## 2025-02-03 PROCEDURE — 3078F DIAST BP <80 MM HG: CPT | Performed by: NURSE PRACTITIONER

## 2025-02-03 PROCEDURE — 93005 ELECTROCARDIOGRAM TRACING: CPT | Performed by: EMERGENCY MEDICINE

## 2025-02-03 PROCEDURE — 96374 THER/PROPH/DIAG INJ IV PUSH: CPT

## 2025-02-03 PROCEDURE — 87086 URINE CULTURE/COLONY COUNT: CPT | Performed by: EMERGENCY MEDICINE

## 2025-02-03 PROCEDURE — 99284 EMERGENCY DEPT VISIT MOD MDM: CPT

## 2025-02-03 RX ORDER — ONDANSETRON 4 MG/1
4 TABLET, ORALLY DISINTEGRATING ORAL EVERY 6 HOURS PRN
Qty: 12 TABLET | Refills: 0 | Status: SHIPPED | OUTPATIENT
Start: 2025-02-03

## 2025-02-03 RX ORDER — SODIUM CHLORIDE 0.9 % (FLUSH) 0.9 %
10 SYRINGE (ML) INJECTION AS NEEDED
Status: DISCONTINUED | OUTPATIENT
Start: 2025-02-03 | End: 2025-02-03 | Stop reason: HOSPADM

## 2025-02-03 RX ORDER — ONDANSETRON 2 MG/ML
4 INJECTION INTRAMUSCULAR; INTRAVENOUS ONCE
Status: COMPLETED | OUTPATIENT
Start: 2025-02-03 | End: 2025-02-03

## 2025-02-03 RX ORDER — OSELTAMIVIR PHOSPHATE 30 MG/1
30 CAPSULE ORAL 2 TIMES DAILY
Qty: 10 CAPSULE | Refills: 0 | Status: SHIPPED | OUTPATIENT
Start: 2025-02-03

## 2025-02-03 RX ORDER — CEPHALEXIN 500 MG/1
500 CAPSULE ORAL 2 TIMES DAILY
Qty: 14 CAPSULE | Refills: 0 | Status: SHIPPED | OUTPATIENT
Start: 2025-02-03

## 2025-02-03 RX ADMIN — SODIUM CHLORIDE 1000 ML: 9 INJECTION, SOLUTION INTRAVENOUS at 17:58

## 2025-02-03 RX ADMIN — ONDANSETRON 4 MG: 2 INJECTION INTRAMUSCULAR; INTRAVENOUS at 17:58

## 2025-02-03 RX ADMIN — SODIUM CHLORIDE 1000 ML: 9 INJECTION, SOLUTION INTRAVENOUS at 17:06

## 2025-02-03 RX ADMIN — CEPHALEXIN 500 MG: 250 CAPSULE ORAL at 17:58

## 2025-02-03 NOTE — PROGRESS NOTES
Chief Complaint  Cough (Coughing up sputum with no color, no nose drainage, fever and chills, no headache, feels like he's been beaten with a ball bat, scratchy/sore throat, wheezing, soa, no diarrhea, nausea or vomiting. Symptoms started Saturday night. )    Subjective      History of Present Illness  The patient is a 79-year-old male who presents for an acute visit. He is a patient of Dr. Kumar. He is accompanied by his wife.    He reports feeling unwell for the past 2 days, with symptoms beginning on Saturday night. He has not been in contact with any known influenza patients. His fluid intake has been minimal. He has not taken his blood pressure medication today or any other medications for the past 2 days. He has been experiencing increased shortness of breath due to his emphysema over the last 2 days. He also reports a mild cough without expectoration, body aches, and a sore throat. He does not report wheezing, diarrhea, nausea, or vomiting. His appetite has decreased due to his illness. His wife suspects a possible urinary tract infection (UTI) based on the odor of his urine, although he is uncertain about the color of his urine. Despite his illness, he was able to walk into the clinic unaided.       Past Medical History:   Diagnosis Date    Allergic rhinitis     Asthma     Broken bones     Coccygeal fracture     Coronary artery disease     WELLS NO HX MI OR INTERVENTION    Diabetes mellitus, type 2     Elevated hemoglobin A1c     Encounter for Medicare annual wellness exam     History of kidney stones     Hyperlipidemia     Hypertension     Lumbar vertebral fracture     Malignant melanoma of skin     Obesity, Class I, BMI 30-34.9     Other emphysema 03/10/2023    Post-nasal drainage     Pulmonary arterial hypertension     Rash     Recurrent nephrolithiasis     Right rotator cuff tear     SOB (shortness of breath) on exertion     Tobacco chew use     Urinary tract infection     Viral infection     Vitamin  D deficiency         Past Surgical History:   Procedure Laterality Date    CARDIAC CATHETERIZATION N/A 2022    Procedure: Left Heart Cath-Dr. Gill wants to do this to follow his device case;  Surgeon: ROBINSON Gill MD;  Location: [facility] CATH INVASIVE LOCATION;  Service: Cardiology;  Laterality: N/A;    CYSTOSCOPY URETHROTOMY VISUAL INTERNAL N/A 2024    Procedure: CYSTOSCOPY URETHROTOMY VISUAL INTERNAL;  Surgeon: Radha Monteiro MD;  Location: [facility] MAIN OR;  Service: Urology;  Laterality: N/A;    INGUINAL HERNIA REPAIR      ROTATOR CUFF REPAIR Right     SINUS SURGERY      FRACTURE REPAIR        Social History     Tobacco Use   Smoking Status Former    Current packs/day: 0.00    Average packs/day: 1.5 packs/day for 20.6 years (30.9 ttl pk-yrs)    Types: Cigarettes    Start date: 1965    Quit date: 3/23/1986    Years since quittin.8    Passive exposure: Past   Smokeless Tobacco Current    Types: Chew   Tobacco Comments    INST PER ANESTHESIA PROTOCOL-not within the last 24 hours        Patient Care Team:  César Kumar MD as PCP - General (Internal Medicine)  Moe Ko MD as Consulting Physician (Pulmonary Disease)  Radha Monteiro MD as Consulting Physician (Urology)    Allergies   Allergen Reactions    Grass Rash          Current Outpatient Medications:     albuterol sulfate  (90 Base) MCG/ACT inhaler, Inhale 2 puffs Every 6 (Six) Hours As Needed for Wheezing or Shortness of Air., Disp: 18 g, Rfl: 5    aspirin 81 MG chewable tablet, Chew 1 tablet Daily. If urine clearing and not passing excessive clot, Disp: , Rfl:     Fluticasone-Umeclidin-Vilant (Trelegy Ellipta) 200-62.5-25 MCG/ACT inhaler, Inhale 1 puff Daily. Take 1 puff daily, Disp: 3 each, Rfl: 5    glipizide (GLUCOTROL XL) 10 MG 24 hr tablet, TAKE 1 TABLET BY MOUTH ONCE DAILY, Disp: 90 tablet, Rfl: 3    guaiFENesin (Mucinex) 600 MG 12 hr tablet, Take 2 tablets by mouth 2 (Two) Times a Day., Disp: 30  "tablet, Rfl: 3    hydroCHLOROthiazide 25 MG tablet, Take 1 tablet by mouth Daily., Disp: 90 tablet, Rfl: 3    metFORMIN ER (GLUCOPHAGE-XR) 500 MG 24 hr tablet, TAKE 1 TABLET BY MOUTH EVERY MORNING WITH BREAKFAST, Disp: 90 tablet, Rfl: 3    ranolazine (RANEXA) 500 MG 12 hr tablet, Take 1 tablet by mouth 2 (Two) Times a Day., Disp: 180 tablet, Rfl: 3    rosuvastatin (CRESTOR) 20 MG tablet, Take 1 tablet by mouth every night at bedtime., Disp: 90 tablet, Rfl: 3    telmisartan (MICARDIS) 80 MG tablet, TAKE 1 TABLET BY MOUTH ONCE DAILY **due FOR office visit FOR further refills**, Disp: 90 tablet, Rfl: 3    torsemide (DEMADEX) 20 MG tablet, Take 1 tablet by mouth Every Other Day., Disp: 45 tablet, Rfl: 3    cephalexin (KEFLEX) 500 MG capsule, Take 1 capsule by mouth 2 (Two) Times a Day., Disp: 14 capsule, Rfl: 0    ondansetron ODT (ZOFRAN-ODT) 4 MG disintegrating tablet, Place 1 tablet on the tongue Every 6 (Six) Hours As Needed for Nausea or Vomiting., Disp: 12 tablet, Rfl: 0    oseltamivir (Tamiflu) 30 MG capsule, Take 1 capsule by mouth 2 (Two) Times a Day., Disp: 10 capsule, Rfl: 0  No current facility-administered medications for this visit.    Objective     Vitals:    02/03/25 1307   BP: (!) 72/55   BP Location: Left arm   Patient Position: Lying   Cuff Size: Large Adult   Resp: 16   Temp: 98 °F (36.7 °C)   TempSrc: Temporal   Weight: 117 kg (257 lb 9.6 oz)   Height: 188 cm (74\")        Wt Readings from Last 3 Encounters:   02/03/25 121 kg (267 lb 10.2 oz)   02/03/25 117 kg (257 lb 9.6 oz)   01/23/25 126 kg (277 lb)        BP Readings from Last 3 Encounters:   02/03/25 119/61   02/03/25 (!) 72/55   01/23/25 131/65        Physical Exam  Vital Signs  The patient's heart rate is 84. Blood pressure is low.    Physical Exam  Constitutional:       Appearance: He is ill-appearing.   HENT:      Head: Normocephalic and atraumatic.      Mouth/Throat:      Mouth: Mucous membranes are dry.   Eyes:      Conjunctiva/sclera: " Conjunctivae normal.   Cardiovascular:      Rate and Rhythm: Normal rate and regular rhythm.      Pulses: Normal pulses.      Heart sounds: Normal heart sounds.      Comments: Pulse is 84  CRT>3 seconds    Pulmonary:      Breath sounds: Normal breath sounds. No wheezing, rhonchi or rales.      Comments: Unable to obtain pulse symmetry.  Lymphadenopathy:      Cervical: No cervical adenopathy.   Skin:     Coloration: Skin is pale.   Neurological:      General: No focal deficit present.      Mental Status: He is alert.                Result Review   The following data was reviewed by: NURY Seymour on 02/03/2025:  [x]  Tests & Results  []  Hospitalization/Emergency Department/Urgent Care  []  Internal/External Consultant Notes       Assessment and Plan     Diagnoses and all orders for this visit:    1. Influenza A (Primary)    2. Hypotension, unspecified hypotension type    3. Other emphysema    4. Acute cough  -     POCT SARS-CoV-2 Antigen YAMILKA + Flu    5. Fever, unspecified fever cause  -     POCT SARS-CoV-2 Antigen YAMILKA + Flu         Assessment & Plan  1. Influenza A.  His influenza A test today is positive in the office. He has been experiencing symptoms for the past 2 days, including shortness of breath, coughing, aches, and sore throat. He has not taken his blood pressure medication for the past 2 days. He appears dehydrated, which may be contributing to his low blood pressure. There is a suspicion of a urinary tract infection (UTI) due to the smell of his urine, although this needs confirmation. His immune system is compromised due to the influenza infection.  He is advised to seek immediate medical attention at the emergency room for intravenous fluid administration, a chest x-ray, and a urinalysis. He is also advised to inquire about Tamiflu treatment during his emergency department visit.    The patient and his wife agreed to go to the emergency department.  The patient was assisted via wheelchair to  vehicle driven by his wife.  She was instructed to have someone assist him to the inside the hospital when they arrive.     Patient was given instructions and counseling regarding his condition or for health maintenance advice. Please see specific information pulled into the AVS if appropriate.     Follow Up   No follow-ups on file.    Patient or patient representative verbalized consent for the use of Ambient Listening during the visit with  NURY Seymour for chart documentation. 2/4/2025  13:29 EST    NURY Seymour

## 2025-02-03 NOTE — ED PROVIDER NOTES
Time: 2:27 PM EST  Date of encounter:  2/3/2025  Independent Historian/Clinical History and Information was obtained by:   Patient and Family    History is limited by: N/A    Chief Complaint   Patient presents with    Fever     Came from dr office, flu A positive, cough, feeling bad since yesterday, low blood pressure at dr office         History of Present Illness:  Patient is a 79 y.o. year old male who presents to the emergency department for evaluation of weakness.  Patient began feeling unwell on Saturday.  Patient's had generalized weakness, fever, cough.  Family members concern for UTI.  Denies nausea, vomiting, diarrhea.  (Provider in triage, Rehan Sal PA-C)    Patient had a low blood pressure in his PCPs office after he tested positive for influenza A.  Patient was sent emergency department for IV fluids.  On 2 diuretics but has not taken them past 2 days.        Patient Care Team  Primary Care Provider: César Kumar MD    Past Medical History:     Allergies   Allergen Reactions    Grass Rash     Past Medical History:   Diagnosis Date    Allergic rhinitis     Asthma     Broken bones     Coccygeal fracture     Coronary artery disease     PRISCILLA NO HX MI OR INTERVENTION    Diabetes mellitus, type 2     Elevated hemoglobin A1c     Encounter for Medicare annual wellness exam     History of kidney stones     Hyperlipidemia     Hypertension     Lumbar vertebral fracture     Malignant melanoma of skin     Obesity, Class I, BMI 30-34.9     Other emphysema 03/10/2023    Post-nasal drainage     Pulmonary arterial hypertension     Rash     Recurrent nephrolithiasis     Right rotator cuff tear     SOB (shortness of breath) on exertion     Tobacco chew use     Urinary tract infection     Viral infection     Vitamin D deficiency      Past Surgical History:   Procedure Laterality Date    CARDIAC CATHETERIZATION N/A 06/01/2022    Procedure: Left Heart Cath-Dr. Gill wants to do this to follow his device  case;  Surgeon: ROBINSON Gill MD;  Location: [facility] INVASIVE LOCATION;  Service: Cardiology;  Laterality: N/A;    CYSTOSCOPY URETHROTOMY VISUAL INTERNAL N/A 8/28/2024    Procedure: CYSTOSCOPY URETHROTOMY VISUAL INTERNAL;  Surgeon: Radha Monteiro MD;  Location: [facility] MAIN OR;  Service: Urology;  Laterality: N/A;    INGUINAL HERNIA REPAIR      ROTATOR CUFF REPAIR Right     SINUS SURGERY      FRACTURE REPAIR     Family History   Problem Relation Age of Onset    COPD Mother     Diabetes Maternal Grandmother     Hypertension Paternal Grandfather     Colon cancer Neg Hx     Prostate cancer Neg Hx     Thyroid disease Neg Hx     Malig Hyperthermia Neg Hx        Home Medications:  Prior to Admission medications    Medication Sig Start Date End Date Taking? Authorizing Provider   albuterol sulfate  (90 Base) MCG/ACT inhaler Inhale 2 puffs Every 6 (Six) Hours As Needed for Wheezing or Shortness of Air. 4/23/24   Moe Ko MD   aspirin 81 MG chewable tablet Chew 1 tablet Daily. If urine clearing and not passing excessive clot 8/31/24   Radha Monteiro MD   Fluticasone-Umeclidin-Vilant (Trelegy Ellipta) 200-62.5-25 MCG/ACT inhaler Inhale 1 puff Daily. Take 1 puff daily 4/23/24   Moe Ko MD   glipizide (GLUCOTROL XL) 10 MG 24 hr tablet TAKE 1 TABLET BY MOUTH ONCE DAILY 11/11/23   Jamarcus Doss MD   guaiFENesin (Mucinex) 600 MG 12 hr tablet Take 2 tablets by mouth 2 (Two) Times a Day. 1/23/25   Moe Ko MD   hydroCHLOROthiazide 25 MG tablet Take 1 tablet by mouth Daily. 4/16/24   ROBINSON Gill MD   metFORMIN ER (GLUCOPHAGE-XR) 500 MG 24 hr tablet TAKE 1 TABLET BY MOUTH EVERY MORNING WITH BREAKFAST 1/30/25   César Kumar MD   ranolazine (RANEXA) 500 MG 12 hr tablet Take 1 tablet by mouth 2 (Two) Times a Day. 5/20/24   ROBINSON Gill MD   rosuvastatin (CRESTOR) 20 MG tablet Take 1 tablet by mouth every night at bedtime. 8/5/24   ROBINSON Gill MD   telmisartan  "(MICARDIS) 80 MG tablet TAKE 1 TABLET BY MOUTH ONCE DAILY **due FOR office visit FOR further refills** 24   César Kumar MD   torsemide (DEMADEX) 20 MG tablet Take 1 tablet by mouth Every Other Day. 10/14/24   César Kumar MD   predniSONE (DELTASONE) 10 MG tablet Take 4 tabs daily x 3 days, then take 3 tabs daily x 3 days, then take 2 tabs daily x 3 days, then take 1 tab daily x 3 days  Patient not taking: Reported on 2/3/2025 1/23/25 2/3/25  Moe Ko MD        Social History:   Social History     Tobacco Use    Smoking status: Former     Current packs/day: 0.00     Average packs/day: 1.5 packs/day for 20.6 years (30.9 ttl pk-yrs)     Types: Cigarettes     Start date: 1965     Quit date: 3/23/1986     Years since quittin.8     Passive exposure: Past    Smokeless tobacco: Current     Types: Chew    Tobacco comments:     INST PER ANESTHESIA PROTOCOL-not within the last 24 hours   Vaping Use    Vaping status: Never Used   Substance Use Topics    Alcohol use: Yes     Alcohol/week: 1.0 standard drink of alcohol     Types: 1 Cans of beer per week     Comment: occ    Drug use: No         Review of Systems:  Review of Systems   Respiratory:  Positive for cough.    Neurological:  Positive for weakness.        Physical Exam:  /61 (BP Location: Left arm, Patient Position: Lying)   Pulse 73   Temp 98 °F (36.7 °C) (Oral)   Resp 20   Ht 188 cm (74\")   Wt 121 kg (267 lb 10.2 oz)   SpO2 91%   BMI 34.36 kg/m²         Physical Exam  Vitals and nursing note reviewed.   Constitutional:       Appearance: Normal appearance.   Cardiovascular:      Rate and Rhythm: Normal rate and regular rhythm.      Heart sounds: Normal heart sounds.   Pulmonary:      Effort: Pulmonary effort is normal.      Breath sounds: Normal breath sounds.   Abdominal:      General: There is no distension.   Musculoskeletal:         General: Normal range of motion.      Cervical back: Normal range of motion. "   Skin:     General: Skin is warm and dry.      Coloration: Skin is not cyanotic.   Neurological:      Mental Status: He is alert and oriented to person, place, and time.   Psychiatric:         Attention and Perception: Attention and perception normal.                            Medical Decision Making:      Comorbidities that affect care:    Coronary Artery Disease, Diabetes, Hypertension    External Notes reviewed:    Previous Clinic Note: Patient's PCP visit tested positive for influenza A      The following orders were placed and all results were independently analyzed by me:  Orders Placed This Encounter   Procedures    Urine Culture - Urine,    XR Chest 1 View    [place] Draw    Comprehensive Metabolic Panel    High Sensitivity Troponin T    Magnesium    Urinalysis With Microscopic If Indicated (No Culture) - Urine, Clean Catch    CBC Auto Differential    High Sensitivity Troponin T 1Hr    Urinalysis, Microscopic Only - Urine, Clean Catch    NPO Diet NPO Type: Strict NPO    Undress & Gown    Continuous Pulse Oximetry    Vital Signs    Orthostatic Blood Pressure    Oxygen Therapy- Nasal Cannula; Titrate 1-6 LPM Per SpO2; 90 - 95%    POC Glucose Once    ECG 12 Lead ED Triage Standing Order; Weak / Dizzy / AMS    Insert Peripheral IV    Fall Precautions    CBC & Differential    Green Top (Gel)    Lavender Top    Gold Top - SST    Light Blue Top       Medications Given in the Emergency Department:  Medications   sodium chloride 0.9 % flush 10 mL (has no administration in time range)   sodium chloride 0.9 % bolus 1,000 mL (1,000 mL Intravenous New Bag 2/3/25 1758)   sodium chloride 0.9 % bolus 1,000 mL (0 mL Intravenous Stopped 2/3/25 1759)   ondansetron (ZOFRAN) injection 4 mg (4 mg Intravenous Given 2/3/25 1758)   cephalexin (KEFLEX) capsule 500 mg (500 mg Oral Given 2/3/25 1758)        ED Course:    The patient was initially evaluated in the triage area where orders were placed. The patient was later  dispositioned by Neftali Guadarrama DO.      The patient was advised to stay for completion of workup which includes but is not limited to communication of labs and radiological results, reassessment and plan. The patient was advised that leaving prior to disposition by a provider could result in critical findings that are not communicated to the patient.     ED Course as of 02/03/25 1808   Mon Feb 03, 2025   1427 PROVIDER IN TRIAGE  Patient was evaluated by me in triage, Rehan Sal PA-C.  Orders were placed and patient is currently awaiting final results and disposition.  [MD]   1432 EKG:    Rhythm: Normal sinus rhythm  Rate: 77  Intervals: Normal  T-wave: Normal  ST Segment: Specific changes    EKG Comparison: 8/28/2024 similar    Interpreted by me   [NL]   1708 XR Chest 1 View [NL]      ED Course User Index  [MD] Rehan Sal PA-C  [NL] Neftali Guadarrama DO       Labs:    Lab Results (last 24 hours)       Procedure Component Value Units Date/Time    POCT SARS-CoV-2 Antigen YAMILKA + Flu [[id number]]  (Abnormal) Collected: 02/03/25 1344    Specimen: Swab Updated: 02/03/25 1345     SARS Antigen Not Detected     Influenza A Antigen YAMILKA Detected     Influenza B Antigen YAMILKA Not Detected     Internal Control Passed     Lot Number [id number]     Expiration Date 11/14/2025    CBC & Differential [[id number]]  (Abnormal) Collected: 02/03/25 1437    Specimen: Blood from Arm, Right Updated: 02/03/25 1452    Narrative:      The following orders were created for panel order CBC & Differential.  Procedure                               Abnormality         Status                     ---------                               -----------         ------                     CBC Auto Differential[[id number]]        Abnormal            Final result                 Please view results for these tests on the individual orders.    Comprehensive Metabolic Panel [[id number]]  (Abnormal) Collected: 02/03/25 1437    Specimen: Blood from Arm, Right  Updated: 02/03/25 1515     Glucose 253 mg/dL      BUN 35 mg/dL      Creatinine 2.51 mg/dL      Sodium 134 mmol/L      Potassium 4.3 mmol/L      Chloride 96 mmol/L      CO2 25.3 mmol/L      Calcium 9.2 mg/dL      Total Protein 6.8 g/dL      Albumin 3.7 g/dL      ALT (SGPT) 22 U/L      AST (SGOT) 11 U/L      Alkaline Phosphatase 63 U/L      Total Bilirubin 1.0 mg/dL      Globulin 3.1 gm/dL      A/G Ratio 1.2 g/dL      BUN/Creatinine Ratio 13.9     Anion Gap 12.7 mmol/L      eGFR 25.5 mL/min/1.73     Narrative:      GFR Categories in Chronic Kidney Disease (CKD)      GFR Category          GFR (mL/min/1.73)    Interpretation  G1                     90 or greater         Normal or high (1)  G2                      60-89                Mild decrease (1)  G3a                   45-59                Mild to moderate decrease  G3b                   30-44                Moderate to severe decrease  G4                    15-29                Severe decrease  G5                    14 or less           Kidney failure          (1)In the absence of evidence of kidney disease, neither GFR category G1 or G2 fulfill the criteria for CKD.    eGFR calculation 2021 CKD-EPI creatinine equation, which does not include race as a factor    High Sensitivity Troponin T [[id number]]  (Abnormal) Collected: 02/03/25 1437    Specimen: Blood from Arm, Right Updated: 02/03/25 1515     HS Troponin T 34 ng/L     Narrative:      High Sensitive Troponin T Reference Range:  <14.0 ng/L- Negative Female for AMI  <22.0 ng/L- Negative Male for AMI  >=14 - Abnormal Female indicating possible myocardial injury.  >=22 - Abnormal Male indicating possible myocardial injury.   Clinicians would have to utilize clinical acumen, EKG, Troponin, and serial changes to determine if it is an Acute Myocardial Infarction or myocardial injury due to an underlying chronic condition.         Magnesium [[id number]]  (Normal) Collected: 02/03/25 1437    Specimen: Blood from Arm,  Right Updated: 02/03/25 1515     Magnesium 2.2 mg/dL     CBC Auto Differential [[id number]]  (Abnormal) Collected: 02/03/25 1437    Specimen: Blood from Arm, Right Updated: 02/03/25 1452     WBC 11.85 10*3/mm3      RBC 4.55 10*6/mm3      Hemoglobin 14.1 g/dL      Hematocrit 42.1 %      MCV 92.5 fL      MCH 31.0 pg      MCHC 33.5 g/dL      RDW 13.1 %      RDW-SD 44.0 fl      MPV 10.7 fL      Platelets 215 10*3/mm3      Neutrophil % 72.2 %      Lymphocyte % 11.8 %      Monocyte % 13.1 %      Eosinophil % 0.4 %      Basophil % 0.6 %      Immature Grans % 1.9 %      Neutrophils, Absolute 8.55 10*3/mm3      Lymphocytes, Absolute 1.40 10*3/mm3      Monocytes, Absolute 1.55 10*3/mm3      Eosinophils, Absolute 0.05 10*3/mm3      Basophils, Absolute 0.07 10*3/mm3      Immature Grans, Absolute 0.23 10*3/mm3      nRBC 0.0 /100 WBC     High Sensitivity Troponin T 1Hr [[id number]]  (Abnormal) Collected: 02/03/25 1610    Specimen: Blood from Arm, Right Updated: 02/03/25 1643     HS Troponin T 29 ng/L      Troponin T Numeric Delta -5 ng/L      Troponin T % Delta -15    Narrative:      High Sensitive Troponin T Reference Range:  <14.0 ng/L- Negative Female for AMI  <22.0 ng/L- Negative Male for AMI  >=14 - Abnormal Female indicating possible myocardial injury.  >=22 - Abnormal Male indicating possible myocardial injury.   Clinicians would have to utilize clinical acumen, EKG, Troponin, and serial changes to determine if it is an Acute Myocardial Infarction or myocardial injury due to an underlying chronic condition.         Urinalysis With Microscopic If Indicated (No Culture) - Urine, Clean Catch [[id number]]  (Abnormal) Collected: 02/03/25 1627    Specimen: Urine, Clean Catch Updated: 02/03/25 1641     Color, UA Dark Yellow     Appearance, UA Turbid     pH, UA <=5.0     Specific Gravity, UA 1.021     Glucose,  mg/dL (Trace)     Ketones, UA Negative     Bilirubin, UA Moderate (2+)     Blood, UA Negative     Protein,   mg/dL (2+)     Leuk Esterase, UA Small (1+)     Nitrite, UA Positive     Urobilinogen, UA 1.0 E.U./dL    Urinalysis, Microscopic Only - Urine, Clean Catch [[id number]]  (Abnormal) Collected: 02/03/25 1627    Specimen: Urine, Clean Catch Updated: 02/03/25 1646     RBC, UA None Seen /HPF      WBC, UA 11-20 /HPF      Bacteria, UA 2+ /HPF      Squamous Epithelial Cells, UA 3-6 /HPF      Hyaline Casts, UA 0-2 /LPF      Methodology Manual Light Microscopy    Urine Culture - Urine, Urine, Clean Catch [[id number]] Collected: 02/03/25 1627    Specimen: Urine, Clean Catch Updated: 02/03/25 1734             Imaging:    XR Chest 1 View    Result Date: 2/3/2025  XR CHEST 1 VW Date of Exam: 2/3/2025 3:05 PM EST Indication: Weak/Dizzy/AMS triage protocol Comparison: 1/15/2024 Findings: Cardiac size is stable. The pulmonary vascular markings in the chest remains normal. The lungs appear clear with no acute process identified. There is some chronic basilar scarring.     Impression: Stable chest, no active disease. Electronically Signed: Sreekanth Langston MD  2/3/2025 3:13 PM EST  Workstation ID: [id number]       Differential Diagnosis and Discussion:      Weakness: Based on the patient's history, signs, and symptoms, the diffential diagnosis includes but is not limited to meningitis, stroke, sepsis, subarachnoid hemorrhage, intracranial bleeding, encephalitis, acute uti, dehydration, MS, myasthenia gravis, [place], migraine variant, neuromuscular disorders vertigo, electrolyte imbalance, and metabolic disorders.    PROCEDURES:    Labs were collected in the emergency department and all labs were reviewed and interpreted by me.  X-ray were performed in the emergency department and all X-ray impressions were independently interpreted by me.  An EKG was performed and the EKG was interpreted by me.    ECG 12 Lead ED Triage Standing Order; Weak / Dizzy / AMS   Preliminary Result   HEART RATE=77  bpm   RR [place]=734  ms   MS [place]=714   ms   P Horizontal Axis=60  deg   P Front Axis=-51  deg   QRSD Interval=93  ms   QT Uvggiytf=046  ms   BPeM=542  ms   QRS Axis=40  deg   T Wave Axis=26  deg   - OTHERWISE NORMAL ECG -   Sinus or ectopic atrial rhythm   Abnormal R-wave progression, early transition   When compared with ECG of 28-Aug-2024 14:17:57,   Significant change in rhythm: previously sinus   Date and Time of Study:2025-02-03 14:32:01           Procedures    MDM  At the time of exam patient's blood pressure is 108/71 he is receiving IV fluids.          Patient Care Considerations:    SEPSIS was considered but is NOT present in the emergency department as SIRS criteria is not present.      Consultants/Shared Management Plan:    None    Social Determinants of Health:    Patient is independent, reliable, and has access to care.       Disposition and Care Coordination:    Discharged: The patient is suitable and stable for discharge with no need for consideration of admission.    I have explained the patient´s condition, diagnoses and treatment plan based on the information available to me at this time. I have answered questions and addressed any concerns. The patient has a good  understanding of the patient´s diagnosis, condition, and treatment plan as can be expected at this point. The vital signs have been stable. The patient´s condition is stable and appropriate for discharge from the emergency department.      The patient will pursue further outpatient evaluation with the primary care physician or other designated or consulting physician as outlined in the discharge instructions. They are agreeable to this plan of care and follow-up instructions have been explained in detail. The patient has received these instructions in written format and has expressed an understanding of the discharge instructions. The patient is aware that any significant change in condition or worsening of symptoms should prompt an immediate return to this or the closest  emergency department or call to 911.  I have explained discharge medications and the need for follow up with the patient/caretakers. This was also printed in the discharge instructions. Patient was discharged with the following medications and follow up:      Medication List        New Prescriptions      cephalexin 500 MG capsule  Commonly known as: KEFLEX  Take 1 capsule by mouth 2 (Two) Times a Day.     ondansetron ODT 4 MG disintegrating tablet  Commonly known as: ZOFRAN-ODT  Place 1 tablet on the tongue Every 6 (Six) Hours As Needed for Nausea or Vomiting.     oseltamivir 30 MG capsule  Commonly known as: Tamiflu  Take 1 capsule by mouth 2 (Two) Times a Day.               Where to Get Your Medications        These medications were sent to [facility] [address] [facility] [phone] [facility] [phone]   [address] [facility] [address]      Phone: [phone]   cephalexin 500 MG capsule  ondansetron ODT 4 MG disintegrating tablet  oseltamivir 30 MG capsule      César Kumar MD  [address] [address]  [phone]    Schedule an appointment as soon as possible for a visit          Final diagnoses:   Influenza A   Acute urinary tract infection        ED Disposition       ED Disposition   Discharge    Condition   Stable    Comment   --               This medical record created using voice recognition software.             Neftali Guadarrama DO  02/03/25 1875

## 2025-02-04 LAB — BACTERIA SPEC AEROBE CULT: NORMAL

## 2025-02-05 ENCOUNTER — TELEPHONE (OUTPATIENT)
Dept: INTERNAL MEDICINE | Age: 80
End: 2025-02-05
Payer: MEDICARE

## 2025-02-05 NOTE — TELEPHONE ENCOUNTER
"    Caller: Kennedy Lin \"Dale\"    Relationship: Self    Best call back number: [phone]    What medication are you requesting: COUGH AND DECONGESTANT     What are your current symptoms: COUGH, CONGESTION     How long have you been experiencing symptoms: 1-2 DAYS     Have you had these symptoms before:    [x] Yes  [] No    Have you been treated for these symptoms before:   [x] Yes  [] No    If a prescription is needed, what is your preferred pharmacy and phone number:      [facility] [address] [phone] [facility] [phone]  [phone]   "

## 2025-02-06 NOTE — TELEPHONE ENCOUNTER
Spoke w/pt stated he was seen in ED  Flu A, was given tamiflu, cephalixin 500 MG, and ondansetron.   Asked to have something for his cough and congestion.    Has appt w/ you 2/14 @ 10 AM  Please advise.

## 2025-02-14 ENCOUNTER — OFFICE VISIT (OUTPATIENT)
Dept: INTERNAL MEDICINE | Age: 80
End: 2025-02-14
Payer: MEDICARE

## 2025-02-14 VITALS
OXYGEN SATURATION: 95 % | WEIGHT: 257 LBS | DIASTOLIC BLOOD PRESSURE: 73 MMHG | TEMPERATURE: 98.4 F | HEIGHT: 74 IN | BODY MASS INDEX: 32.98 KG/M2 | HEART RATE: 70 BPM | SYSTOLIC BLOOD PRESSURE: 132 MMHG

## 2025-02-14 DIAGNOSIS — E78.2 MIXED HYPERLIPIDEMIA: ICD-10-CM

## 2025-02-14 DIAGNOSIS — E55.9 VITAMIN D DEFICIENCY: ICD-10-CM

## 2025-02-14 DIAGNOSIS — R97.20 ELEVATED PSA: ICD-10-CM

## 2025-02-14 DIAGNOSIS — N17.9 AKI (ACUTE KIDNEY INJURY): ICD-10-CM

## 2025-02-14 DIAGNOSIS — E11.9 TYPE 2 DIABETES MELLITUS TREATED WITHOUT INSULIN: ICD-10-CM

## 2025-02-14 DIAGNOSIS — I25.10 CORONARY ARTERY DISEASE INVOLVING NATIVE CORONARY ARTERY OF NATIVE HEART WITHOUT ANGINA PECTORIS: ICD-10-CM

## 2025-02-14 DIAGNOSIS — R35.0 BENIGN PROSTATIC HYPERPLASIA WITH URINARY FREQUENCY: ICD-10-CM

## 2025-02-14 DIAGNOSIS — N40.1 BENIGN PROSTATIC HYPERPLASIA WITH URINARY FREQUENCY: ICD-10-CM

## 2025-02-14 DIAGNOSIS — N18.31 STAGE 3A CHRONIC KIDNEY DISEASE: ICD-10-CM

## 2025-02-14 DIAGNOSIS — R06.09 DYSPNEA ON EXERTION: ICD-10-CM

## 2025-02-14 DIAGNOSIS — Z12.5 SCREENING PSA (PROSTATE SPECIFIC ANTIGEN): ICD-10-CM

## 2025-02-14 DIAGNOSIS — Z12.11 SCREENING FOR COLON CANCER: Primary | ICD-10-CM

## 2025-02-14 DIAGNOSIS — I10 ESSENTIAL HYPERTENSION: ICD-10-CM

## 2025-02-14 DIAGNOSIS — I50.32 CHRONIC DIASTOLIC (CONGESTIVE) HEART FAILURE: ICD-10-CM

## 2025-02-14 PROCEDURE — 3078F DIAST BP <80 MM HG: CPT | Performed by: INTERNAL MEDICINE

## 2025-02-14 PROCEDURE — 1126F AMNT PAIN NOTED NONE PRSNT: CPT | Performed by: INTERNAL MEDICINE

## 2025-02-14 PROCEDURE — 3075F SYST BP GE 130 - 139MM HG: CPT | Performed by: INTERNAL MEDICINE

## 2025-02-14 PROCEDURE — 99214 OFFICE O/P EST MOD 30 MIN: CPT | Performed by: INTERNAL MEDICINE

## 2025-02-14 PROCEDURE — G2211 COMPLEX E/M VISIT ADD ON: HCPCS | Performed by: INTERNAL MEDICINE

## 2025-02-14 NOTE — PROGRESS NOTES
"Chief Complaint   Patient presents with    Diabetes     Routine follow up , lab follow up. Pt has finished all flu therapy and still has a small cough.     Doing well , no cp   Recent flu a, feb 3, 2025     Objective   Vital Signs  Vitals:    02/14/25 1001   BP: 132/73   BP Location: Right arm   Patient Position: Sitting   Pulse: 70   Temp: 98.4 °F (36.9 °C)   SpO2: 95%   Weight: 117 kg (257 lb)   Height: 188 cm (74.02\")      Body mass index is 32.98 kg/m².  Review of Systems   Constitutional: Negative.    HENT: Negative.     Eyes: Negative.    Respiratory: Negative.     Cardiovascular: Negative.    Gastrointestinal: Negative.    Endocrine: Negative.    Genitourinary: Negative.    Musculoskeletal: Negative.    Allergic/Immunologic: Negative.    Neurological: Negative.    Hematological: Negative.    Psychiatric/Behavioral: Negative.        Physical Exam  Constitutional:       General: He is not in acute distress.     Appearance: Normal appearance. He is obese.   HENT:      Head: Normocephalic.      Right Ear: There is impacted cerumen.      Left Ear: There is impacted cerumen.      Mouth/Throat:      Mouth: Mucous membranes are moist.   Eyes:      Conjunctiva/sclera: Conjunctivae normal.      Pupils: Pupils are equal, round, and reactive to light.   Cardiovascular:      Rate and Rhythm: Normal rate and regular rhythm.      Pulses: Normal pulses.      Heart sounds: Normal heart sounds.   Pulmonary:      Effort: Pulmonary effort is normal.      Breath sounds: Normal breath sounds.   Abdominal:      General: Bowel sounds are normal.      Palpations: Abdomen is soft.   Musculoskeletal:         General: No swelling. Normal range of motion.      Cervical back: Neck supple.   Skin:     General: Skin is warm and dry.      Coloration: Skin is not jaundiced.   Neurological:      General: No focal deficit present.      Mental Status: He is alert and oriented to person, place, and time. Mental status is at baseline. "   Psychiatric:         Mood and Affect: Mood normal.         Behavior: Behavior normal.         Thought Content: Thought content normal.         Judgment: Judgment normal.        Result Review :   Lab Results   Component Value Date    PROBNP 180.0 10/07/2024    PROBNP 405.0 01/17/2024     CMP          6/11/2024    11:43 10/7/2024    10:32 2/3/2025    14:37   CMP   Glucose 168  143  253    BUN 19  19  35    Creatinine 1.32  1.45  2.51    EGFR 55.6  49.3  25.5    Sodium 139  139  134    Potassium 4.4  4.9  4.3    Chloride 102  101  96    Calcium 9.7  9.2  9.2    Total Protein 6.8  6.6  6.8    Albumin 4.2  4.1  3.7    Globulin 2.6  2.5  3.1    Total Bilirubin 0.5  0.5  1.0    Alkaline Phosphatase 69  73  63    AST (SGOT) 12  16  11    ALT (SGPT) 14  19  22    Albumin/Globulin Ratio 1.6  1.6  1.2    BUN/Creatinine Ratio 14.4  13.1  13.9    Anion Gap 12.4  9.7  12.7      CBC w/diff          6/11/2024    11:43 10/7/2024    10:32 2/3/2025    14:37   CBC w/Diff   WBC 6.31  7.89  11.85    RBC 4.45  4.36  4.55    Hemoglobin 13.6  13.3  14.1    Hematocrit 40.6  41.7  42.1    MCV 91.2  95.6  92.5    MCH 30.6  30.5  31.0    MCHC 33.5  31.9  33.5    RDW 12.3  13.0  13.1    Platelets 168  150  215    Neutrophil Rel % 66.2  55.5  72.2    Immature Granulocyte Rel % 0.5  0.1  1.9    Lymphocyte Rel % 21.6  28.6  11.8    Monocyte Rel % 8.7  11.0  13.1    Eosinophil Rel % 2.5  4.4  0.4    Basophil Rel % 0.5  0.4  0.6       Lipid Panel          6/11/2024    11:43 10/7/2024    10:32   Lipid Panel   Total Cholesterol 89  89    Triglycerides 154  100    HDL Cholesterol 30  31    VLDL Cholesterol 26  19    LDL Cholesterol  33  39    LDL/HDL Ratio 0.94  1.23       Lab Results   Component Value Date    TSH 4.200 06/11/2024    TSH 1.220 01/17/2024    TSH 1.750 04/28/2023      Lab Results   Component Value Date    FREET4 1.11 06/11/2024    FREET4 1.09 01/17/2024      A1C Last 3 Results          6/11/2024    11:43 10/7/2024    10:32   HGBA1C Last  3 Results   Hemoglobin A1C 6.60  6.40                        Visit Diagnoses:    ICD-10-CM ICD-9-CM   1. Screening for colon cancer  Z12.11 V76.51   2. Type 2 diabetes mellitus treated without insulin  E11.9 250.00   3. DELANEY (acute kidney injury)  N17.9 584.9   4. Screening PSA (prostate specific antigen)  Z12.5 V76.44   5. Chronic diastolic (congestive) heart failure  I50.32 428.32     428.0   6. Essential hypertension  I10 401.9   7. Coronary artery disease (moderate per cath but not stented)   I25.10 414.01   8. Vitamin D deficiency  E55.9 268.9   9. Benign prostatic hyperplasia with urinary frequency  N40.1 600.01    R35.0 788.41   10. Stage 3a chronic kidney disease  N18.31 585.3   11. Elevated PSA  R97.20 790.93   12. Mixed hyperlipidemia  E78.2 272.2   13. Dyspnea on exertion  R06.09 786.09       Assessment and Plan   Diagnoses and all orders for this visit:    1. Screening for colon cancer (Primary)  -     Cologuard - Stool, Per Rectum; Future  -     Microalbumin / Creatinine Urine Ratio - Urine, Clean Catch; Future  -     Comprehensive Metabolic Panel; Future  -     CBC & Differential; Future  -     Hemoglobin A1c; Future  -     PSA Screen; Future  -     Lipid Panel; Future  -     CBC & Differential; Future  -     Comprehensive Metabolic Panel; Future  -     Hemoglobin A1c; Future  -     Lipid Panel; Future    2. Type 2 diabetes mellitus treated without insulin  -     Microalbumin / Creatinine Urine Ratio - Urine, Clean Catch; Future  -     Comprehensive Metabolic Panel; Future  -     CBC & Differential; Future  -     Hemoglobin A1c; Future  -     PSA Screen; Future  -     Lipid Panel; Future  -     CBC & Differential; Future  -     Comprehensive Metabolic Panel; Future  -     Hemoglobin A1c; Future  -     Lipid Panel; Future    3. DELANEY (acute kidney injury)  -     Cancel: Basic Metabolic Panel; Future  -     Comprehensive Metabolic Panel; Future  -     CBC & Differential; Future  -     Hemoglobin A1c;  Future  -     PSA Screen; Future  -     Lipid Panel; Future  -     CBC & Differential; Future  -     Comprehensive Metabolic Panel; Future  -     Hemoglobin A1c; Future  -     Lipid Panel; Future    4. Screening PSA (prostate specific antigen)  -     Comprehensive Metabolic Panel; Future  -     CBC & Differential; Future  -     Hemoglobin A1c; Future  -     PSA Screen; Future  -     Lipid Panel; Future  -     CBC & Differential; Future  -     Comprehensive Metabolic Panel; Future  -     Hemoglobin A1c; Future  -     Lipid Panel; Future    5. Chronic diastolic (congestive) heart failure  -     Comprehensive Metabolic Panel; Future  -     CBC & Differential; Future  -     Hemoglobin A1c; Future  -     PSA Screen; Future  -     Lipid Panel; Future  -     CBC & Differential; Future  -     Comprehensive Metabolic Panel; Future  -     Hemoglobin A1c; Future  -     Lipid Panel; Future    6. Essential hypertension  -     Comprehensive Metabolic Panel; Future  -     CBC & Differential; Future  -     Hemoglobin A1c; Future  -     PSA Screen; Future  -     Lipid Panel; Future  -     CBC & Differential; Future  -     Comprehensive Metabolic Panel; Future  -     Hemoglobin A1c; Future  -     Lipid Panel; Future    7. Coronary artery disease (moderate per cath but not stented)   -     Comprehensive Metabolic Panel; Future  -     CBC & Differential; Future  -     Hemoglobin A1c; Future  -     PSA Screen; Future  -     Lipid Panel; Future  -     CBC & Differential; Future  -     Comprehensive Metabolic Panel; Future  -     Hemoglobin A1c; Future  -     Lipid Panel; Future    8. Vitamin D deficiency  -     Comprehensive Metabolic Panel; Future  -     CBC & Differential; Future  -     Hemoglobin A1c; Future  -     PSA Screen; Future  -     Lipid Panel; Future  -     CBC & Differential; Future  -     Comprehensive Metabolic Panel; Future  -     Hemoglobin A1c; Future  -     Lipid Panel; Future    9. Benign prostatic hyperplasia with  urinary frequency  -     Comprehensive Metabolic Panel; Future  -     CBC & Differential; Future  -     Hemoglobin A1c; Future  -     PSA Screen; Future  -     Lipid Panel; Future  -     CBC & Differential; Future  -     Comprehensive Metabolic Panel; Future  -     Hemoglobin A1c; Future  -     Lipid Panel; Future    10. Stage 3a chronic kidney disease  -     Comprehensive Metabolic Panel; Future  -     CBC & Differential; Future  -     Hemoglobin A1c; Future  -     PSA Screen; Future  -     Lipid Panel; Future  -     CBC & Differential; Future  -     Comprehensive Metabolic Panel; Future  -     Hemoglobin A1c; Future  -     Lipid Panel; Future    11. Elevated PSA  -     Comprehensive Metabolic Panel; Future  -     CBC & Differential; Future  -     Hemoglobin A1c; Future  -     PSA Screen; Future  -     Lipid Panel; Future  -     CBC & Differential; Future  -     Comprehensive Metabolic Panel; Future  -     Hemoglobin A1c; Future  -     Lipid Panel; Future    12. Mixed hyperlipidemia  -     Comprehensive Metabolic Panel; Future  -     CBC & Differential; Future  -     Hemoglobin A1c; Future  -     PSA Screen; Future  -     Lipid Panel; Future  -     CBC & Differential; Future  -     Comprehensive Metabolic Panel; Future  -     Hemoglobin A1c; Future  -     Lipid Panel; Future    13. Dyspnea on exertion  -     Comprehensive Metabolic Panel; Future  -     CBC & Differential; Future  -     Hemoglobin A1c; Future  -     PSA Screen; Future  -     Lipid Panel; Future  -     CBC & Differential; Future  -     Comprehensive Metabolic Panel; Future  -     Hemoglobin A1c; Future  -     Lipid Panel; Future      DELANEY , HYPOTENSION FEB 3, 2025 AND DX WITH DEHYDRATION AND FLU A  RX WITH TAMIFLU-- ALSO DX WITH UTI AND RX WITH KEFLEX PER ER. -----WILL RECHECK CHEM 7 TODAY FEB 14, 2025     cystoscopy with ureterotomy August 28, 2024 post procedure had a catheter in for about 10 days when they took that out he developed chills sepsis  fever and UTI with acute kidney injury had to go back in the hospital with IV antibiotics and was sent home on oral Levaquin and just finished that course around September 16, 2024, resolved as of October 14, 2024===has f/u with urology march 2025     Annual wellness visit questionnaire completed June 11, 2024,    Shortness of breath upper back and neck and chest tightness, === for stress test echocardiogram around November 1, 2024, patient had labs October 7, 2024 proBNP 180, continues hydrochlorothiazide 25 mg daily---nucl, stress test normal nov 4, 2024,  , echo-- mod diastolic dys, nl lv , aortic valve sclerosis,      Hematuria/ dysuria --patient does follow-up with Dr. Santana urology,  CT abdomen and pelvis March 4, 2024,, mild bladder wall thickening and haziness of the perivesicular fat suggesting possible cystitis there is some haziness around the prostate gland suggesting possible prostatitis there was some renal and hepatic cysts colonic diverticulosis a slight irregularity to the abdominal aorta 2.9 cm but no kidney stones or solid renal masses-----had cystoscopy April 18, 2024==back in hospital aug 28, 2024 as above     Ckd 3a     Cad, cath, --no stents, med mgt.=== Dr harvey, ---cont ranexa 500 mg bid , crestor 20 mg qd , coq -10     Copd--ct chest low dose , normal march 2023,,, CT chest low-dose screening April 19, 2024 shows mild emphysema no active airways disease aortic and severe calcified coronary atherosclerotic disease otherwise negative, recommend 1 year follow-up --------------------------------continues trelegy daily , prn albuterol hfa,   ----chest x-ray January 15, 2024 shows blunting posterior costophrenic sulcus on the left, shadows in the lower lungs could relate to cardiogenic cardiophrenic fat pads degenerative changes of the dorsal spine otherwise    Lower extremity edema 2-3+,, proBNP level 180 October 7, 2024--- will add torsemide 20 mg every other day to his current regimen below,  to help with edema discussed October 14, 2024    Pulm htn --cont ?      Htn cont telmisartan 80 mg qd , hctz 25 mg qd     Bph, cont flomax 0.8 mg qhs --does see urology    Dm 2 , cont glipizide xl 10 mg qd, metformin extended release 500 mg daily ==hga1c = 6.4 October 7, 2024, urine microalbumin was - neg ---January 17, 2024-    Overwgt , ? S off steroids   now ,      Vit d def mild jan 2024     Psa 1.8, jan 2024                   Follow Up   Return in about 5 months (around 7/14/2025).  Patient was given instructions and counseling regarding his condition or for health maintenance advice. Please see specific information pulled into the AVS if appropriate.

## 2025-03-10 ENCOUNTER — TELEPHONE (OUTPATIENT)
Dept: CARDIOLOGY | Facility: CLINIC | Age: 80
End: 2025-03-10
Payer: MEDICARE

## 2025-03-10 NOTE — TELEPHONE ENCOUNTER
"  Caller: Kennedy Lin \"Dale\"    Relationship: Self    Best call back number: [phone]     What is the best time to reach you: ANYTIME     Who are you requesting to speak with (clinical staff, provider,  specific staff member):      What was the call regarding: PATIENT NEEDS MORNING APPOINTMENT ON 3/20/25. HUB UNABLE TO MAKE APPOINTMENT. PLEASE CALL PATIENT TO RESCHEDULE IF POSSIBLE.     "

## 2025-03-10 NOTE — TELEPHONE ENCOUNTER
"  Caller: Kennedy Lin \"Dale\"    Relationship to patient: Self    Best call back number: [phone]     New or established patient?  [] New  [x] Established    Date of discharge: 03.09.25    Facility discharged from: STARTED AT [facility] THEN TRANSFERRED TO [facility]    Diagnosis/Symptoms: BLOOD CLOTS ON LUNGS    "

## 2025-03-17 ENCOUNTER — OFFICE VISIT (OUTPATIENT)
Dept: INTERNAL MEDICINE | Age: 80
End: 2025-03-17
Payer: MEDICARE

## 2025-03-17 VITALS
HEART RATE: 119 BPM | SYSTOLIC BLOOD PRESSURE: 117 MMHG | WEIGHT: 255 LBS | HEIGHT: 74 IN | BODY MASS INDEX: 32.73 KG/M2 | OXYGEN SATURATION: 98 % | TEMPERATURE: 98.2 F | DIASTOLIC BLOOD PRESSURE: 64 MMHG

## 2025-03-17 DIAGNOSIS — N40.1 BENIGN PROSTATIC HYPERPLASIA WITH URINARY FREQUENCY: ICD-10-CM

## 2025-03-17 DIAGNOSIS — E11.9 TYPE 2 DIABETES MELLITUS TREATED WITHOUT INSULIN: ICD-10-CM

## 2025-03-17 DIAGNOSIS — R06.09 DYSPNEA ON EXERTION: ICD-10-CM

## 2025-03-17 DIAGNOSIS — I26.99 ACUTE PULMONARY EMBOLISM, UNSPECIFIED PULMONARY EMBOLISM TYPE, UNSPECIFIED WHETHER ACUTE COR PULMONALE PRESENT: Primary | ICD-10-CM

## 2025-03-17 DIAGNOSIS — Z12.5 SCREENING PSA (PROSTATE SPECIFIC ANTIGEN): ICD-10-CM

## 2025-03-17 DIAGNOSIS — I10 ESSENTIAL HYPERTENSION: ICD-10-CM

## 2025-03-17 DIAGNOSIS — R35.0 BENIGN PROSTATIC HYPERPLASIA WITH URINARY FREQUENCY: ICD-10-CM

## 2025-03-17 DIAGNOSIS — E55.9 VITAMIN D DEFICIENCY: ICD-10-CM

## 2025-03-17 DIAGNOSIS — N18.31 STAGE 3A CHRONIC KIDNEY DISEASE: ICD-10-CM

## 2025-03-17 DIAGNOSIS — I25.10 CORONARY ARTERY DISEASE INVOLVING NATIVE CORONARY ARTERY OF NATIVE HEART WITHOUT ANGINA PECTORIS: ICD-10-CM

## 2025-03-17 NOTE — PROGRESS NOTES
"Chief Complaint   Patient presents with    Hospital Follow Up Visit     03/7-03/09 [place], PE , Sepsis and UTI. Pt has some concerns regarding his medication.    Was in the hospital March 7 through March 9, 2025, with acute pulmonary embolus acute kidney injury on top of stage III chronic kidney disease, hospital course unprovoked, prescribed Eliquis continues, continued him on hydrochlorothiazide 25 mg daily and torsemide 20 mg every other day, in addition to his other medications, patient family have concerns, is to follow-up with myself cardiology and pulmonology    Objective   Vital Signs  Vitals:    03/17/25 1557   BP: 117/64   BP Location: Left arm   Patient Position: Sitting   Pulse: 119   Temp: 98.2 °F (36.8 °C)   SpO2: 98%   Weight: 116 kg (255 lb)   Height: 188 cm (74.02\")      Body mass index is 32.73 kg/m².  Review of Systems   Constitutional: Negative.    HENT: Negative.     Eyes: Negative.    Respiratory: Negative.     Cardiovascular: Negative.    Gastrointestinal: Negative.    Endocrine: Negative.    Genitourinary: Negative.    Musculoskeletal: Negative.    Allergic/Immunologic: Negative.    Neurological: Negative.    Hematological: Negative.    Psychiatric/Behavioral: Negative.        Physical Exam  Constitutional:       General: He is not in acute distress.     Appearance: Normal appearance. He is obese.   HENT:      Head: Normocephalic.      Mouth/Throat:      Mouth: Mucous membranes are moist.   Eyes:      Conjunctiva/sclera: Conjunctivae normal.      Pupils: Pupils are equal, round, and reactive to light.   Cardiovascular:      Rate and Rhythm: Normal rate and regular rhythm.      Pulses: Normal pulses.      Heart sounds: Normal heart sounds.   Pulmonary:      Effort: Pulmonary effort is normal.      Breath sounds: Normal breath sounds.   Abdominal:      General: Bowel sounds are normal.      Palpations: Abdomen is soft.   Musculoskeletal:         General: No swelling. Normal range of motion.    "   Cervical back: Neck supple.      Right lower leg: Edema present.      Left lower leg: Edema present.   Skin:     General: Skin is warm and dry.      Coloration: Skin is not jaundiced.   Neurological:      General: No focal deficit present.      Mental Status: He is alert and oriented to person, place, and time. Mental status is at baseline.   Psychiatric:         Mood and Affect: Mood normal.         Behavior: Behavior normal.         Thought Content: Thought content normal.         Judgment: Judgment normal.        Result Review :   Lab Results   Component Value Date    PROBNP 180.0 10/07/2024    PROBNP 405.0 01/17/2024     CMP          6/11/2024    11:43 10/7/2024    10:32 2/3/2025    14:37   CMP   Glucose 168  143  253    BUN 19  19  35    Creatinine 1.32  1.45  2.51    EGFR 55.6  49.3  25.5    Sodium 139  139  134    Potassium 4.4  4.9  4.3    Chloride 102  101  96    Calcium 9.7  9.2  9.2    Total Protein 6.8  6.6  6.8    Albumin 4.2  4.1  3.7    Globulin 2.6  2.5  3.1    Total Bilirubin 0.5  0.5  1.0    Alkaline Phosphatase 69  73  63    AST (SGOT) 12  16  11    ALT (SGPT) 14  19  22    Albumin/Globulin Ratio 1.6  1.6  1.2    BUN/Creatinine Ratio 14.4  13.1  13.9    Anion Gap 12.4  9.7  12.7      CBC w/diff          6/11/2024    11:43 10/7/2024    10:32 2/3/2025    14:37   CBC w/Diff   WBC 6.31  7.89  11.85    RBC 4.45  4.36  4.55    Hemoglobin 13.6  13.3  14.1    Hematocrit 40.6  41.7  42.1    MCV 91.2  95.6  92.5    MCH 30.6  30.5  31.0    MCHC 33.5  31.9  33.5    RDW 12.3  13.0  13.1    Platelets 168  150  215    Neutrophil Rel % 66.2  55.5  72.2    Immature Granulocyte Rel % 0.5  0.1  1.9    Lymphocyte Rel % 21.6  28.6  11.8    Monocyte Rel % 8.7  11.0  13.1    Eosinophil Rel % 2.5  4.4  0.4    Basophil Rel % 0.5  0.4  0.6       Lipid Panel          6/11/2024    11:43 10/7/2024    10:32   Lipid Panel   Total Cholesterol 89  89    Triglycerides 154  100    HDL Cholesterol 30  31    VLDL Cholesterol 26  19     LDL Cholesterol  33  39    LDL/HDL Ratio 0.94  1.23       Lab Results   Component Value Date    TSH 4.200 06/11/2024    TSH 1.220 01/17/2024    TSH 1.750 04/28/2023      Lab Results   Component Value Date    FREET4 1.11 06/11/2024    FREET4 1.09 01/17/2024      A1C Last 3 Results          6/11/2024    11:43 10/7/2024    10:32   HGBA1C Last 3 Results   Hemoglobin A1C 6.60  6.40                        Visit Diagnoses:    ICD-10-CM ICD-9-CM   1. Acute pulmonary embolism, unspecified pulmonary embolism type, unspecified whether acute cor pulmonale present  I26.99 415.19   2. Stage 3a chronic kidney disease  N18.31 585.3   3. Benign prostatic hyperplasia with urinary frequency  N40.1 600.01    R35.0 788.41   4. Essential hypertension  I10 401.9   5. Coronary artery disease (moderate per cath but not stented)   I25.10 414.01   6. Dyspnea on exertion  R06.09 786.09   7. Screening PSA (prostate specific antigen)  Z12.5 V76.44   8. Vitamin D deficiency  E55.9 268.9   9. Type 2 diabetes mellitus treated without insulin  E11.9 250.00       Assessment and Plan   Diagnoses and all orders for this visit:    1. Acute pulmonary embolism, unspecified pulmonary embolism type, unspecified whether acute cor pulmonale present (Primary)  -     Comprehensive Metabolic Panel; Future  -     CBC & Differential; Future  -     proBNP; Future  -     Magnesium; Future  -     Hemoglobin A1c; Future  -     Sedimentation Rate; Future  -     PSA Screen; Future    2. Stage 3a chronic kidney disease  -     Comprehensive Metabolic Panel; Future  -     CBC & Differential; Future  -     proBNP; Future  -     Magnesium; Future  -     Hemoglobin A1c; Future  -     Sedimentation Rate; Future  -     PSA Screen; Future    3. Benign prostatic hyperplasia with urinary frequency  -     Comprehensive Metabolic Panel; Future  -     CBC & Differential; Future  -     proBNP; Future  -     Magnesium; Future  -     Hemoglobin A1c; Future  -     Sedimentation Rate;  Future  -     PSA Screen; Future    4. Essential hypertension  -     Comprehensive Metabolic Panel; Future  -     CBC & Differential; Future  -     proBNP; Future  -     Magnesium; Future  -     Hemoglobin A1c; Future  -     Sedimentation Rate; Future  -     PSA Screen; Future    5. Coronary artery disease (moderate per cath but not stented)   -     Comprehensive Metabolic Panel; Future  -     CBC & Differential; Future  -     proBNP; Future  -     Magnesium; Future  -     Hemoglobin A1c; Future  -     Sedimentation Rate; Future  -     PSA Screen; Future    6. Dyspnea on exertion  -     Comprehensive Metabolic Panel; Future  -     CBC & Differential; Future  -     proBNP; Future  -     Magnesium; Future  -     Hemoglobin A1c; Future  -     Sedimentation Rate; Future  -     PSA Screen; Future    7. Screening PSA (prostate specific antigen)  -     Comprehensive Metabolic Panel; Future  -     CBC & Differential; Future  -     proBNP; Future  -     Magnesium; Future  -     Hemoglobin A1c; Future  -     Sedimentation Rate; Future  -     PSA Screen; Future    8. Vitamin D deficiency  -     Comprehensive Metabolic Panel; Future  -     CBC & Differential; Future  -     proBNP; Future  -     Magnesium; Future  -     Hemoglobin A1c; Future  -     Sedimentation Rate; Future  -     PSA Screen; Future    9. Type 2 diabetes mellitus treated without insulin  -     Comprehensive Metabolic Panel; Future  -     CBC & Differential; Future  -     proBNP; Future  -     Magnesium; Future  -     Hemoglobin A1c; Future  -     Sedimentation Rate; Future  -     PSA Screen; Future    Other orders  -     apixaban (ELIQUIS) 5 MG tablet tablet; Take 1 tablet by mouth 2 (Two) Times a Day.  Dispense: 60 tablet; Refill: 5        Acute PE, mainly right lung with apparent right heart strain multiple emboli, =====ADMITTED TO [facility] [place], treated with Eliquis, etiology for the blood clots is undetermined at this point,===VELE ==NEG MARCH 7,  2025, initial chest x-ray showed no active disease====== shortness of breath improved considerably after starting anticoagulation,    UTI recurrent treatment ongoing March 7, 2025 at [facility] ==and then upon transfer at [facility]    DELANEY , HYPOTENSION FEB 3, 2025 AND DX WITH DEHYDRATION AND FLU A  RX WITH TAMIFLU-- ALSO DX WITH UTI AND RX WITH KEFLEX PER ER.     cystoscopy with ureterotomy August 28, 2024 post procedure had a catheter in for about 10 days when they took that out he developed chills sepsis fever and UTI with acute kidney injury had to go back in the hospital with IV antibiotics and was sent home on oral Levaquin and just finished that course around September 16, 2024, resolved as of October 14, 2024===has f/u with urology march 2025     Annual wellness visit questionnaire completed June 11, 2024,    Shortness of breath upper back and neck and chest tightness, == had labs October 7, 2024 proBNP 180, continues hydrochlorothiazide 25 mg daily---nucl, stress test ==normal nov 4, 2024,  , echo-- mod diastolic dys, nl lv , aortic valve sclerosis,      Hematuria/ dysuria --patient does follow-up with Dr. Santana urology,  CT abdomen and pelvis March 4, 2024,, mild bladder wall thickening and haziness of the perivesicular fat suggesting possible cystitis there is some haziness around the prostate gland suggesting possible prostatitis there was some renal and hepatic cysts colonic diverticulosis a slight irregularity to the abdominal aorta 2.9 cm but no kidney stones or solid renal masses-----had cystoscopy April 18, 2024==back in hospital aug 28, 2024 as above     Ckd 3a     Cad, cath, --no stents, med mgt.=== Dr harvey, ---cont ranexa 500 mg bid , crestor 20 mg qd , coq -10     Copd--ct chest low dose , normal march 2023,,, CT chest low-dose screening April 19, 2024 shows mild emphysema no active airways disease aortic and severe calcified coronary atherosclerotic disease otherwise negative, recommend 1 year  follow-up --------------------------------continues trelegy daily , prn albuterol hfa,   ----chest x-ray January 15, 2024 shows blunting posterior costophrenic sulcus on the left, shadows in the lower lungs could relate to cardiogenic cardiophrenic fat pads degenerative changes of the dorsal spine otherwise    Lower extremity edema 2-3+,, proBNP level 180 October 7, 2024--- will add torsemide 20 mg every other day to his current regimen below, to help with edema discussed October 14, 2024    Pulm htn --cont ?      Htn cont telmisartan 80 mg qd , hctz 25 mg qd     Bph, patient's been off flomax 0.8 mg qhs --does see urology    Dm 2 , cont  metformin extended release 500 mg daily ==hga1c = 6.4 October 7, 2024, urine microalbumin was - neg ---January 17, 2024-currently off glipizide also March 17, 2025    Overwgt , ? S off steroids   now ,      Vit d def mild jan 2024     Psa 1.8, jan 2024                     Follow Up   Return in about 1 month (around 4/17/2025) for Next scheduled follow up.  Patient was given instructions and counseling regarding his condition or for health maintenance advice. Please see specific information pulled into the AVS if appropriate.

## 2025-03-20 ENCOUNTER — OFFICE VISIT (OUTPATIENT)
Dept: CARDIOLOGY | Facility: CLINIC | Age: 80
End: 2025-03-20
Payer: MEDICARE

## 2025-03-20 VITALS
WEIGHT: 244.4 LBS | HEART RATE: 76 BPM | SYSTOLIC BLOOD PRESSURE: 112 MMHG | DIASTOLIC BLOOD PRESSURE: 56 MMHG | BODY MASS INDEX: 31.37 KG/M2 | HEIGHT: 74 IN

## 2025-03-20 DIAGNOSIS — E78.2 HYPERLIPEMIA, MIXED: ICD-10-CM

## 2025-03-20 DIAGNOSIS — I50.32 CHRONIC DIASTOLIC (CONGESTIVE) HEART FAILURE: ICD-10-CM

## 2025-03-20 DIAGNOSIS — I10 ESSENTIAL HYPERTENSION: ICD-10-CM

## 2025-03-20 DIAGNOSIS — I25.10 CORONARY ARTERY DISEASE INVOLVING NATIVE CORONARY ARTERY OF NATIVE HEART WITHOUT ANGINA PECTORIS: Primary | ICD-10-CM

## 2025-03-20 DIAGNOSIS — I26.99 OTHER PULMONARY EMBOLISM WITHOUT ACUTE COR PULMONALE, UNSPECIFIED CHRONICITY: ICD-10-CM

## 2025-03-20 RX ORDER — TORSEMIDE 20 MG/1
20 TABLET ORAL DAILY
COMMUNITY

## 2025-03-20 NOTE — PROGRESS NOTES
Chief Complaint  Hospital Follow Up Visit (Ximena/daryl-has concerns about being on 2 water pills and the statin )    Subjective        History of Present Illness  Kennedy Lin presents to [facility] for follow up.   Patient is a 79-year-old male with past medical history significant for coronary artery disease, dyspnea, hypertension with previous LHC demonstrating 30% LAD, 80% small circumflex branch, normal RCA which is managed medically.  He presents for follow-up on recent hospitalization for unprovoked PE.  He reports his breathing is better than it has been in a long time.  He denies any chest pain or discomfort.  He has no palpitations, edema or syncope.    Past Medical History:   Diagnosis Date    Acute pulmonary embolism 3/17/2025    Allergic rhinitis     Asthma     Broken bones     Coccygeal fracture     Coronary artery disease     PRISCILLA DE LA ROSA HX MI OR INTERVENTION    Diabetes mellitus, type 2     Elevated hemoglobin A1c     Encounter for Medicare annual wellness exam     History of kidney stones     Hyperlipidemia     Hypertension     Lumbar vertebral fracture     Malignant melanoma of skin     Obesity, Class I, BMI 30-34.9     Other emphysema 03/10/2023    Post-nasal drainage     Pulmonary arterial hypertension     Rash     Recurrent nephrolithiasis     Right rotator cuff tear     SOB (shortness of breath) on exertion     Tobacco chew use     Urinary tract infection     Viral infection     Vitamin D deficiency        ALLERGY  Allergies   Allergen Reactions    Grass Rash        Past Surgical History:   Procedure Laterality Date    CARDIAC CATHETERIZATION N/A 06/01/2022    Procedure: Left Heart Cath-Dr. Gill wants to do this to follow his device case;  Surgeon: ROBINSON Gill MD;  Location: [facility] CATH INVASIVE LOCATION;  Service: Cardiology;  Laterality: N/A;    CYSTOSCOPY URETHROTOMY VISUAL INTERNAL N/A 8/28/2024    Procedure: CYSTOSCOPY URETHROTOMY VISUAL INTERNAL;   Surgeon: Radha Monteiro MD;  Location: [facility] MAIN OR;  Service: Urology;  Laterality: N/A;    INGUINAL HERNIA REPAIR      ROTATOR CUFF REPAIR Right     SINUS SURGERY      FRACTURE REPAIR        Social History     Socioeconomic History    Marital status:    Tobacco Use    Smoking status: Former     Current packs/day: 0.00     Average packs/day: 1.5 packs/day for 20.6 years (30.9 ttl pk-yrs)     Types: Cigarettes     Start date: 1965     Quit date: 3/23/1986     Years since quittin.0     Passive exposure: Past    Smokeless tobacco: Current     Types: Chew    Tobacco comments:     INST PER ANESTHESIA PROTOCOL-not within the last 24 hours   Vaping Use    Vaping status: Never Used   Substance and Sexual Activity    Alcohol use: Yes     Alcohol/week: 1.0 standard drink of alcohol     Types: 1 Cans of beer per week     Comment: occ    Drug use: No    Sexual activity: Yes     Partners: Female       Family History   Problem Relation Age of Onset    COPD Mother     Diabetes Maternal Grandmother     Hypertension Paternal Grandfather     Colon cancer Neg Hx     Prostate cancer Neg Hx     Thyroid disease Neg Hx     Malig Hyperthermia Neg Hx         Current Outpatient Medications on File Prior to Visit   Medication Sig    albuterol sulfate  (90 Base) MCG/ACT inhaler Inhale 2 puffs Every 6 (Six) Hours As Needed for Wheezing or Shortness of Air.    apixaban (ELIQUIS) 5 MG tablet tablet Take 1 tablet by mouth 2 (Two) Times a Day.    aspirin 81 MG chewable tablet Chew 1 tablet Daily. If urine clearing and not passing excessive clot    Fluticasone-Umeclidin-Vilant (Trelegy Ellipta) 200-62.5-25 MCG/ACT inhaler Inhale 1 puff Daily. Take 1 puff daily    hydroCHLOROthiazide 25 MG tablet Take 1 tablet by mouth Daily.    metFORMIN ER (GLUCOPHAGE-XR) 500 MG 24 hr tablet TAKE 1 TABLET BY MOUTH EVERY MORNING WITH BREAKFAST    ranolazine (RANEXA) 500 MG 12 hr tablet Take 1 tablet by mouth 2 (Two) Times a Day.     "rosuvastatin (CRESTOR) 20 MG tablet Take 1 tablet by mouth every night at bedtime.    telmisartan (MICARDIS) 80 MG tablet TAKE 1 TABLET BY MOUTH ONCE DAILY **due FOR office visit FOR further refills**    torsemide (DEMADEX) 20 MG tablet Take 1 tablet by mouth Daily.     No current facility-administered medications on file prior to visit.       Objective   Vitals:    03/20/25 1330   BP: 112/56   Pulse: 76   Weight: 111 kg (244 lb 6.4 oz)   Height: 188 cm (74.02\")       Physical Exam  Constitutional:       General: He is awake. He is not in acute distress.     Appearance: Normal appearance.   HENT:      Head: Normocephalic.      Nose: Nose normal. No congestion.   Eyes:      Extraocular Movements: Extraocular movements intact.      Conjunctiva/sclera: Conjunctivae normal.      Pupils: Pupils are equal, round, and reactive to light.   Neck:      Thyroid: No thyromegaly.      Vascular: No JVD.   Cardiovascular:      Rate and Rhythm: Normal rate and regular rhythm.      Chest Wall: PMI is not displaced.      Pulses: Normal pulses.      Heart sounds: Normal heart sounds, S1 normal and S2 normal. No murmur heard.     No friction rub. No gallop. No S3 or S4 sounds.   Pulmonary:      Effort: Pulmonary effort is normal.      Breath sounds: Normal breath sounds. No wheezing, rhonchi or rales.   Abdominal:      General: Bowel sounds are normal.      Palpations: Abdomen is soft.      Tenderness: There is no abdominal tenderness.   Musculoskeletal:      Cervical back: No tenderness.      Right lower leg: No edema.      Left lower leg: No edema.   Lymphadenopathy:      Cervical: No cervical adenopathy.   Skin:     General: Skin is warm and dry.      Capillary Refill: Capillary refill takes less than 2 seconds.      Coloration: Skin is not cyanotic.      Findings: No petechiae or rash.      Nails: There is no clubbing.   Neurological:      Mental Status: He is alert.   Psychiatric:         Mood and Affect: Mood normal.         " Behavior: Behavior is cooperative.           Result Review     The following data was reviewed by NURY St on 03/20/25.      CMP          6/11/2024    11:43 10/7/2024    10:32 2/3/2025    14:37   CMP   Glucose 168  143  253    BUN 19  19  35    Creatinine 1.32  1.45  2.51    EGFR 55.6  49.3  25.5    Sodium 139  139  134    Potassium 4.4  4.9  4.3    Chloride 102  101  96    Calcium 9.7  9.2  9.2    Total Protein 6.8  6.6  6.8    Albumin 4.2  4.1  3.7    Globulin 2.6  2.5  3.1    Total Bilirubin 0.5  0.5  1.0    Alkaline Phosphatase 69  73  63    AST (SGOT) 12  16  11    ALT (SGPT) 14  19  22    Albumin/Globulin Ratio 1.6  1.6  1.2    BUN/Creatinine Ratio 14.4  13.1  13.9    Anion Gap 12.4  9.7  12.7      CBC w/diff          6/11/2024    11:43 10/7/2024    10:32 2/3/2025    14:37   CBC w/Diff   WBC 6.31  7.89  11.85    RBC 4.45  4.36  4.55    Hemoglobin 13.6  13.3  14.1    Hematocrit 40.6  41.7  42.1    MCV 91.2  95.6  92.5    MCH 30.6  30.5  31.0    MCHC 33.5  31.9  33.5    RDW 12.3  13.0  13.1    Platelets 168  150  215    Neutrophil Rel % 66.2  55.5  72.2    Immature Granulocyte Rel % 0.5  0.1  1.9    Lymphocyte Rel % 21.6  28.6  11.8    Monocyte Rel % 8.7  11.0  13.1    Eosinophil Rel % 2.5  4.4  0.4    Basophil Rel % 0.5  0.4  0.6       Lipid Panel          6/11/2024    11:43 10/7/2024    10:32   Lipid Panel   Total Cholesterol 89  89    Triglycerides 154  100    HDL Cholesterol 30  31    VLDL Cholesterol 26  19    LDL Cholesterol  33  39    LDL/HDL Ratio 0.94  1.23        Results for orders placed during the hospital encounter of 11/01/24    Adult Transthoracic Echo Complete W/ Cont if Necessary Per Protocol    Interpretation Summary  Technically difficult study.  All cardiac structures are not well-visualized.    Grossly normal chamber sizes.  LV has concentric remodeled hypertrophy.  Systolic function is preserved.  Estimated LVEF is 50 to 55%.  Diastolic function is moderately  abnormal.  Trileaflet aortic valve is present.  Aortic valve sclerosis is noted.  No aortic regurgitation is present.  Based on DVI stenosis appeared to be mild.  Mitral valve has thickened leaflets.  MAC is present.  No significant MR is noted.  Tricuspid valve and pulmonic valve are not well-visualized.  Ascending aorta is measured around 3.7 mm.  Aortic root is not well-visualized.  IVC is normal size corresponding to right atrial pressure 0 to 5 mmHg.    Compared to echocardiogram from 6/1/2022 diastolic function now appears to be moderate.  There are no other significant changes    Results for orders placed during the hospital encounter of 11/01/24    Stress Test With Myocardial Perfusion One Day    Interpretation Summary    Myocardial perfusion imaging indicates a normal myocardial perfusion study with no evidence of ischemia. Impressions are consistent with a low risk study.    Left ventricular ejection fraction is normal (Calculated EF = 56%).    Findings consistent with a normal ECG stress test.    No results found for this or any previous visit.          Procedures      Assessment & Plan  Coronary artery disease (moderate per cath but not stented)   Clinically stable with no angina or anginal-like symptoms.  Continue baby aspirin and Ranexa.  Essential hypertension  Well-controlled on current regimen which will be continued.  Hyperlipemia, mixed  Continue statin therapy.  LDL is at goal.  Chronic diastolic (congestive) heart failure  Clinically patient is euvolemic and well compensated on exam.  Recommend de-escalating diuretics to preserve kidney function.  Other pulmonary embolism without acute cor pulmonale, unspecified chronicity  Recent hospitalization for unprovoked PE.  Continue lifelong anticoagulation with Eliquis 5 mg twice daily.  Echocardiogram during his hospitalization was reviewed and demonstrated no evidence of right heart failure and preserved ejection fraction.                   The  medical services provided during this encounter are part of ongoing care related to this patient's single serious condition or complex condition.  Follow Up   Return in about 6 months (around 9/20/2025) for Dr. Gill.    Patient was given instructions and counseling regarding his condition or for health maintenance advice. Please see specific information pulled into the AVS if appropriate.     Dixie Jaime, APRN  03/20/25  13:49 EDT    Dictated Utilizing Dragon Dictation

## 2025-03-20 NOTE — ASSESSMENT & PLAN NOTE
Clinically patient is euvolemic and well compensated on exam.  Recommend de-escalating diuretics to preserve kidney function.

## 2025-03-24 ENCOUNTER — OFFICE VISIT (OUTPATIENT)
Dept: PULMONOLOGY | Facility: CLINIC | Age: 80
End: 2025-03-24
Payer: MEDICARE

## 2025-03-24 ENCOUNTER — LAB (OUTPATIENT)
Dept: LAB | Facility: HOSPITAL | Age: 80
End: 2025-03-24
Payer: MEDICARE

## 2025-03-24 VITALS
DIASTOLIC BLOOD PRESSURE: 68 MMHG | SYSTOLIC BLOOD PRESSURE: 120 MMHG | WEIGHT: 244 LBS | HEIGHT: 74 IN | BODY MASS INDEX: 31.32 KG/M2 | RESPIRATION RATE: 18 BRPM | OXYGEN SATURATION: 98 % | TEMPERATURE: 97.5 F | HEART RATE: 73 BPM

## 2025-03-24 DIAGNOSIS — N40.1 BPH WITH OBSTRUCTION/LOWER URINARY TRACT SYMPTOMS: ICD-10-CM

## 2025-03-24 DIAGNOSIS — J43.8 OTHER EMPHYSEMA: ICD-10-CM

## 2025-03-24 DIAGNOSIS — I25.10 CORONARY ARTERY DISEASE INVOLVING NATIVE CORONARY ARTERY OF NATIVE HEART WITHOUT ANGINA PECTORIS: ICD-10-CM

## 2025-03-24 DIAGNOSIS — I26.99 OTHER ACUTE PULMONARY EMBOLISM WITHOUT ACUTE COR PULMONALE: ICD-10-CM

## 2025-03-24 DIAGNOSIS — Z12.5 SCREENING PSA (PROSTATE SPECIFIC ANTIGEN): ICD-10-CM

## 2025-03-24 DIAGNOSIS — R35.0 BENIGN PROSTATIC HYPERPLASIA WITH URINARY FREQUENCY: ICD-10-CM

## 2025-03-24 DIAGNOSIS — I26.99 ACUTE PULMONARY EMBOLISM, UNSPECIFIED PULMONARY EMBOLISM TYPE, UNSPECIFIED WHETHER ACUTE COR PULMONALE PRESENT: ICD-10-CM

## 2025-03-24 DIAGNOSIS — E55.9 VITAMIN D DEFICIENCY: ICD-10-CM

## 2025-03-24 DIAGNOSIS — N17.9 AKI (ACUTE KIDNEY INJURY): ICD-10-CM

## 2025-03-24 DIAGNOSIS — N17.9 ACUTE KIDNEY INJURY: ICD-10-CM

## 2025-03-24 DIAGNOSIS — J41.8 MIXED SIMPLE AND MUCOPURULENT CHRONIC BRONCHITIS: Primary | ICD-10-CM

## 2025-03-24 DIAGNOSIS — R97.20 ELEVATED PSA: ICD-10-CM

## 2025-03-24 DIAGNOSIS — E78.2 MIXED HYPERLIPIDEMIA: ICD-10-CM

## 2025-03-24 DIAGNOSIS — I10 ESSENTIAL HYPERTENSION: ICD-10-CM

## 2025-03-24 DIAGNOSIS — N13.8 BPH WITH OBSTRUCTION/LOWER URINARY TRACT SYMPTOMS: ICD-10-CM

## 2025-03-24 DIAGNOSIS — R06.09 DYSPNEA ON EXERTION: ICD-10-CM

## 2025-03-24 DIAGNOSIS — N40.1 BENIGN PROSTATIC HYPERPLASIA WITH URINARY FREQUENCY: ICD-10-CM

## 2025-03-24 DIAGNOSIS — Z12.11 SCREENING FOR COLON CANCER: ICD-10-CM

## 2025-03-24 DIAGNOSIS — I50.32 CHRONIC DIASTOLIC (CONGESTIVE) HEART FAILURE: ICD-10-CM

## 2025-03-24 DIAGNOSIS — E11.9 TYPE 2 DIABETES MELLITUS TREATED WITHOUT INSULIN: ICD-10-CM

## 2025-03-24 DIAGNOSIS — N18.31 STAGE 3A CHRONIC KIDNEY DISEASE: ICD-10-CM

## 2025-03-24 LAB
ALBUMIN SERPL-MCNC: 3.6 G/DL (ref 3.5–5.2)
ALBUMIN UR-MCNC: <1.2 MG/DL
ALBUMIN/GLOB SERPL: 1.1 G/DL
ALP SERPL-CCNC: 69 U/L (ref 39–117)
ALT SERPL W P-5'-P-CCNC: 13 U/L (ref 1–41)
ANION GAP SERPL CALCULATED.3IONS-SCNC: 8.3 MMOL/L (ref 5–15)
AST SERPL-CCNC: 13 U/L (ref 1–40)
BASOPHILS # BLD AUTO: 0.02 10*3/MM3 (ref 0–0.2)
BASOPHILS NFR BLD AUTO: 0.3 % (ref 0–1.5)
BILIRUB SERPL-MCNC: 0.6 MG/DL (ref 0–1.2)
BUN SERPL-MCNC: 21 MG/DL (ref 8–23)
BUN/CREAT SERPL: 13 (ref 7–25)
CALCIUM SPEC-SCNC: 9.8 MG/DL (ref 8.6–10.5)
CHLORIDE SERPL-SCNC: 100 MMOL/L (ref 98–107)
CHOLEST SERPL-MCNC: 104 MG/DL (ref 0–200)
CO2 SERPL-SCNC: 27.7 MMOL/L (ref 22–29)
CREAT SERPL-MCNC: 1.62 MG/DL (ref 0.76–1.27)
CREAT UR-MCNC: 101.2 MG/DL
DEPRECATED RDW RBC AUTO: 47.9 FL (ref 37–54)
EGFRCR SERPLBLD CKD-EPI 2021: 43.2 ML/MIN/1.73
EOSINOPHIL # BLD AUTO: 0.18 10*3/MM3 (ref 0–0.4)
EOSINOPHIL NFR BLD AUTO: 2.9 % (ref 0.3–6.2)
ERYTHROCYTE [DISTWIDTH] IN BLOOD BY AUTOMATED COUNT: 13.5 % (ref 12.3–15.4)
ERYTHROCYTE [SEDIMENTATION RATE] IN BLOOD: 4 MM/HR (ref 0–20)
GLOBULIN UR ELPH-MCNC: 3.3 GM/DL
GLUCOSE SERPL-MCNC: 188 MG/DL (ref 65–99)
HBA1C MFR BLD: 8.5 % (ref 4.8–5.6)
HCT VFR BLD AUTO: 37.2 % (ref 37.5–51)
HDLC SERPL-MCNC: 29 MG/DL (ref 40–60)
HGB BLD-MCNC: 11.8 G/DL (ref 13–17.7)
IMM GRANULOCYTES # BLD AUTO: 0.02 10*3/MM3 (ref 0–0.05)
IMM GRANULOCYTES NFR BLD AUTO: 0.3 % (ref 0–0.5)
INR PPP: 1.83 (ref 0.86–1.15)
LDLC SERPL CALC-MCNC: 39 MG/DL (ref 0–100)
LDLC/HDLC SERPL: 1.01 {RATIO}
LYMPHOCYTES # BLD AUTO: 1.69 10*3/MM3 (ref 0.7–3.1)
LYMPHOCYTES NFR BLD AUTO: 27.5 % (ref 19.6–45.3)
MAGNESIUM SERPL-MCNC: 1.9 MG/DL (ref 1.6–2.4)
MCH RBC QN AUTO: 30.1 PG (ref 26.6–33)
MCHC RBC AUTO-ENTMCNC: 31.7 G/DL (ref 31.5–35.7)
MCV RBC AUTO: 94.9 FL (ref 79–97)
MICROALBUMIN/CREAT UR: NORMAL MG/G{CREAT}
MONOCYTES # BLD AUTO: 0.69 10*3/MM3 (ref 0.1–0.9)
MONOCYTES NFR BLD AUTO: 11.2 % (ref 5–12)
NEUTROPHILS NFR BLD AUTO: 3.55 10*3/MM3 (ref 1.7–7)
NEUTROPHILS NFR BLD AUTO: 57.8 % (ref 42.7–76)
NT-PROBNP SERPL-MCNC: 149 PG/ML (ref 0–1800)
PLATELET # BLD AUTO: 223 10*3/MM3 (ref 140–450)
PMV BLD AUTO: 10.7 FL (ref 6–12)
POTASSIUM SERPL-SCNC: 4.4 MMOL/L (ref 3.5–5.2)
PROT SERPL-MCNC: 6.9 G/DL (ref 6–8.5)
PROTHROMBIN TIME: 22 SECONDS (ref 11.8–14.9)
PSA SERPL-MCNC: 3.1 NG/ML (ref 0–4)
RBC # BLD AUTO: 3.92 10*6/MM3 (ref 4.14–5.8)
SODIUM SERPL-SCNC: 136 MMOL/L (ref 136–145)
TRIGL SERPL-MCNC: 228 MG/DL (ref 0–150)
VLDLC SERPL-MCNC: 36 MG/DL (ref 5–40)
WBC NRBC COR # BLD AUTO: 6.15 10*3/MM3 (ref 3.4–10.8)

## 2025-03-24 PROCEDURE — 3074F SYST BP LT 130 MM HG: CPT | Performed by: INTERNAL MEDICINE

## 2025-03-24 PROCEDURE — 80061 LIPID PANEL: CPT

## 2025-03-24 PROCEDURE — 83036 HEMOGLOBIN GLYCOSYLATED A1C: CPT

## 2025-03-24 PROCEDURE — 85610 PROTHROMBIN TIME: CPT

## 2025-03-24 PROCEDURE — 1111F DSCHRG MED/CURRENT MED MERGE: CPT | Performed by: INTERNAL MEDICINE

## 2025-03-24 PROCEDURE — 1160F RVW MEDS BY RX/DR IN RCRD: CPT | Performed by: INTERNAL MEDICINE

## 2025-03-24 PROCEDURE — 85025 COMPLETE CBC W/AUTO DIFF WBC: CPT

## 2025-03-24 PROCEDURE — 80053 COMPREHEN METABOLIC PANEL: CPT

## 2025-03-24 PROCEDURE — 99213 OFFICE O/P EST LOW 20 MIN: CPT | Performed by: INTERNAL MEDICINE

## 2025-03-24 PROCEDURE — 83735 ASSAY OF MAGNESIUM: CPT

## 2025-03-24 PROCEDURE — G0103 PSA SCREENING: HCPCS

## 2025-03-24 PROCEDURE — 36415 COLL VENOUS BLD VENIPUNCTURE: CPT

## 2025-03-24 PROCEDURE — G2211 COMPLEX E/M VISIT ADD ON: HCPCS | Performed by: INTERNAL MEDICINE

## 2025-03-24 PROCEDURE — 3078F DIAST BP <80 MM HG: CPT | Performed by: INTERNAL MEDICINE

## 2025-03-24 PROCEDURE — 1159F MED LIST DOCD IN RCRD: CPT | Performed by: INTERNAL MEDICINE

## 2025-03-24 PROCEDURE — 85652 RBC SED RATE AUTOMATED: CPT

## 2025-03-24 PROCEDURE — 83880 ASSAY OF NATRIURETIC PEPTIDE: CPT

## 2025-03-24 PROCEDURE — 82043 UR ALBUMIN QUANTITATIVE: CPT

## 2025-03-24 PROCEDURE — 82570 ASSAY OF URINE CREATININE: CPT

## 2025-03-24 NOTE — PROGRESS NOTES
Pulmonary Office Follow-up    Subjective     Kennedy Lin is seen today at the office for   Chief Complaint   Patient presents with    Follow-up    COPD with acute exacerbation         HPI  Kennedy Lin is a 79 y.o. male with a PMH significant for COPD and recently diagnosed pulmonary embolism presents for follow-up patient has been doing well and denies any chest pain fever hemoptysis or worsening breathlessness he denies any calf pain or swelling he is on Eliquis for his PE      Tobacco use history:  Former smoker      Patient Active Problem List   Diagnosis    Essential hypertension    Type 2 diabetes mellitus treated without insulin    Smokeless tobacco use    Medicare annual wellness visit, subsequent    Coronary artery disease (moderate per cath but not stented)     High cholesterol    Other emphysema    Other allergic rhinitis    Colonoscopy declined (Cologuard ordered in 2023)     BPH with obstruction/lower urinary tract symptoms    Mixed hyperlipidemia    Benign prostatic hyperplasia with lower urinary tract symptoms    Annual physical exam    Screening PSA (prostate specific antigen)    Elevated PSA    Vitamin D deficiency    Skin lesion of back    Dyspnea on exertion    Leukocytosis    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    All other systems reviewed and are negative.    As described in the HPI. Otherwise, remainder of ROS (14 systems) were negative.    Medications, Allergies, Social, and Family Histories reviewed as per EMR.    Result Review :            Objective     Vitals:    03/24/25 1450   BP: 120/68   Pulse: 73  PCP handed completed forms to RN.   Copy made for safekeeping and originals placed on Chelsea Luis's desk   "  Resp: 18   Temp: 97.5 °F (36.4 °C)   SpO2: 98%         03/24/25  1450   Weight: 111 kg (244 lb)       Physical Exam  Vitals and nursing note reviewed.   Constitutional:       Appearance: He is obese.   HENT:      Head: Normocephalic and atraumatic.      Nose: Nose normal.      Mouth/Throat:      Mouth: Mucous membranes are moist.      Pharynx: Oropharynx is clear.   Eyes:      Extraocular Movements: Extraocular movements intact.      Conjunctiva/sclera: Conjunctivae normal.      Pupils: Pupils are equal, round, and reactive to light.   Cardiovascular:      Rate and Rhythm: Normal rate and regular rhythm.      Pulses: Normal pulses.      Heart sounds: Normal heart sounds.   Pulmonary:      Effort: Pulmonary effort is normal.      Breath sounds: Normal breath sounds.   Musculoskeletal:         General: Normal range of motion.      Cervical back: Normal range of motion and neck supple.   Skin:     General: Skin is warm.      Capillary Refill: Capillary refill takes 2 to 3 seconds.   Neurological:      Mental Status: He is alert and oriented to person, place, and time.   Psychiatric:         Mood and Affect: Mood normal.         Behavior: Behavior normal.         CT chest for pulmonary embolus  Result Date: 3/6/2025  Multiple right lung pulmonary emboli with apparent right heart strain. Dr. Davison was called and notified of these findings prior to dictation. Images reviewed, interpreted and dictated by Dr. Dimitry Roberts MD    XR Chest 1 View  Result Date: 3/6/2025  No acute cardiopulmonary process. Images reviewed, interpreted, dictated and electronically signed by Delvin Broussard MD Voice transcription technology (Power Scribe) is used for the dictation of this note and \"sound-alike\" words might be erroneously placed despite reviewing this note for accuracy. Errors in dictation may reflect use of voice recognition software and not all errors in transcription may have been detected prior to signing.    XR Chest 1 " View  Result Date: 2/3/2025  Impression: Stable chest, no active disease. Electronically Signed: Sreekanth Langston MD  2/3/2025 3:13 PM EST  Workstation ID: [id number]       Assessment & Plan     Diagnoses and all orders for this visit:    1. Mixed simple and mucopurulent chronic bronchitis (Primary)    2. Other acute pulmonary embolism without acute cor pulmonale         Discussion/ Recommendations:   Patient is advised to continue his Trelegy  Continue Eliquis  Advised to reduce weight his BMI is 31.33  Regular exercise  Vaccinations discussed and recommended             Return in about 3 months (around 6/24/2025).          This document has been electronically signed by Moe Ko MD on March 24, 2025 15:19 EDT

## 2025-03-31 ENCOUNTER — OFFICE VISIT (OUTPATIENT)
Dept: UROLOGY | Facility: CLINIC | Age: 80
End: 2025-03-31
Payer: MEDICARE

## 2025-03-31 VITALS — WEIGHT: 270 LBS | BODY MASS INDEX: 34.65 KG/M2 | HEIGHT: 74 IN

## 2025-03-31 DIAGNOSIS — R39.11 BENIGN PROSTATIC HYPERPLASIA WITH URINARY HESITANCY: Primary | ICD-10-CM

## 2025-03-31 DIAGNOSIS — N39.0 RECURRENT UTI: ICD-10-CM

## 2025-03-31 DIAGNOSIS — N40.1 BENIGN PROSTATIC HYPERPLASIA WITH URINARY HESITANCY: Primary | ICD-10-CM

## 2025-03-31 PROBLEM — Z12.11 SCREENING FOR COLON CANCER: Status: RESOLVED | Noted: 2025-02-14 | Resolved: 2025-03-31

## 2025-03-31 PROBLEM — Z53.20 COLONOSCOPY REFUSED: Status: RESOLVED | Noted: 2023-04-28 | Resolved: 2025-03-31

## 2025-03-31 PROBLEM — Z72.0 SMOKELESS TOBACCO USE: Status: RESOLVED | Noted: 2020-10-09 | Resolved: 2025-03-31

## 2025-03-31 PROBLEM — N13.8 BPH WITH OBSTRUCTION/LOWER URINARY TRACT SYMPTOMS: Status: RESOLVED | Noted: 2023-11-16 | Resolved: 2025-03-31

## 2025-03-31 PROBLEM — Z12.5 SCREENING PSA (PROSTATE SPECIFIC ANTIGEN): Status: RESOLVED | Noted: 2024-01-17 | Resolved: 2025-03-31

## 2025-03-31 PROBLEM — N30.01 ACUTE CYSTITIS WITH HEMATURIA: Status: RESOLVED | Noted: 2024-09-17 | Resolved: 2025-03-31

## 2025-03-31 PROBLEM — Z00.00 MEDICARE ANNUAL WELLNESS VISIT, SUBSEQUENT: Status: RESOLVED | Noted: 2021-11-11 | Resolved: 2025-03-31

## 2025-03-31 PROBLEM — N17.9 AKI (ACUTE KIDNEY INJURY): Status: RESOLVED | Noted: 2025-02-14 | Resolved: 2025-03-31

## 2025-03-31 PROBLEM — Z00.00 ANNUAL PHYSICAL EXAM: Status: RESOLVED | Noted: 2024-01-17 | Resolved: 2025-03-31

## 2025-03-31 LAB
BILIRUB BLD-MCNC: NEGATIVE MG/DL
CLARITY, POC: CLEAR
COLOR UR: YELLOW
EXPIRATION DATE: ABNORMAL
GLUCOSE UR STRIP-MCNC: NEGATIVE MG/DL
KETONES UR QL: NEGATIVE
LEUKOCYTE EST, POC: ABNORMAL
Lab: ABNORMAL
NITRITE UR-MCNC: NEGATIVE MG/ML
PH UR: 5.5 [PH] (ref 5–8)
PROT UR STRIP-MCNC: NEGATIVE MG/DL
RBC # UR STRIP: NEGATIVE /UL
SP GR UR: 1.02 (ref 1–1.03)
URINE VOLUME: NORMAL
UROBILINOGEN UR QL: ABNORMAL

## 2025-03-31 PROCEDURE — 99213 OFFICE O/P EST LOW 20 MIN: CPT | Performed by: NURSE PRACTITIONER

## 2025-03-31 PROCEDURE — 1160F RVW MEDS BY RX/DR IN RCRD: CPT | Performed by: NURSE PRACTITIONER

## 2025-03-31 PROCEDURE — 1159F MED LIST DOCD IN RCRD: CPT | Performed by: NURSE PRACTITIONER

## 2025-03-31 PROCEDURE — 81003 URINALYSIS AUTO W/O SCOPE: CPT | Performed by: NURSE PRACTITIONER

## 2025-03-31 PROCEDURE — 51798 US URINE CAPACITY MEASURE: CPT | Performed by: NURSE PRACTITIONER

## 2025-03-31 NOTE — PROGRESS NOTES
"Chief Complaint: Benign Prostatic Hypertrophy (Patient states that he a procedure done and had a UTI after that is was just curious why. He denies any urinary issues at this moment. )    Subjective         Primary Care Follow-Up  Benign Prostatic Hypertrophy      Kennedy Lin is a 79 y.o. male presents to [facility] to be seen for f/u urethral stricture.    The patient underwent a DVIU  8/28/24 with dr. Cedillo.    Patient was seen in the emergency department at [facility] on 2/3/25 after being diagnosed with the flu a at his provider's office.  The patient had noted general fatigue and malaise with low blood pressure at his doctor's office.    Apparently his family numbers had concern for urinary tract infection.    The patient's urinalysis with microscopy when he was in the emergency department revealed 11-20 white blood cells per high-power field 2+ bacteria and 3-6 squamous epithelial cells.  Subsequent urine culture was negative for any bacterial growth.    The patient was treated with Keflex 500 mg twice daily for 7 days and was sent home.    The patient's urine was very concentrated while in the emergency department.    He was seen again in the ED at [facility] on 3/6/25 with concern for SOA subsequently was found to have a PE and  sepsis.     Was admitted to [facility], treated with a round of iv antibiotics.     No further issues with voiding.       Urine cultures:  3/6/2025 10,000 200,000 colony-forming units per milliliter viridians Streptococcus            Previous:   Patient underwent DVIU 8/28/24 with dr. Cedillo.     He did have his catheter removed 9/6/24.     He states later in the day after he removed his cath he felt very lethargic and began to have chills.     He presented to the Ed at [facility] on 9/7/24 he did end up admitted to the ED for urosepsis.    D/c note from [facility] \"he presented for fever generalized malaise and confusion he was " "found to have a acute cystitis. This is most likely associated with the Abraham catheter that was in for 10 days prior to admission. The patient has been voiding well easily no suprapubic pain, no dysuria no gross hematuria. Urinalysis had  white blood cells and 11-20 red blood cells. Culture grew out Klebsiella pneumoniae. Sensitivity is not back yet but are antibiogram shows 94% sensitivity to Levaquin. The patient is very anxious to be discharged home. He will be discharged home on Levaquin 500 mg p.o. daily for 7 days follow-up with his urologist within a week.\"     He was told his uti was related to having a catheter for too long.    He states today he is emptying very well.     Pvr 132      Previous:     Patient saw Dr. Radha Monteiro on 4/18/2024 for cystoscopy related to urethral stricture and hematuria.  Pendulous or urethral stricture was noted approximately 8 Gibraltarian in size unable to advance scope.  Rest of the urethra as well as prostate and bladder was unable to be evaluated.  Unable to remove rule out underlying malignancy or possible etiology for hematuria.  Etiology may be related to stricture however this did warrant complete evaluation.  Dr. Monteiro discussed proceeding to the operating room to address his stricture as well as perform a complete cystoscopy.  Management of stricture would be via DVIU and cystoscopy risks benefits and alternatives were discussed.    His primary concern was noted to be requiring catheterization after other options including urethral dilation would not require a catheter however these do have a high risk of recurrence.    Patient was agreeable to DVIU and cystoscopy however wanted to wait until December.    Patient is here to follow-up to evaluate his symptoms.    He is doing well at this time.     We discussed procedure once again.     Discussed benefits including preserving upper urinary tracts, prevention of Urinary infections, bladder diverticula or stones as " well as decreased compliance of the bladder.    Currently having no symptoms.     No issues.        Objective     Past Medical History:   Diagnosis Date    Acute pulmonary embolism 3/17/2025    Allergic rhinitis     Asthma     Broken bones     Coccygeal fracture     Coronary artery disease     PRISCILLA NO HX MI OR INTERVENTION    Diabetes mellitus, type 2     Elevated hemoglobin A1c     Encounter for Medicare annual wellness exam     History of kidney stones     Hyperlipidemia     Hypertension     Lumbar vertebral fracture     Malignant melanoma of skin     Obesity, Class I, BMI 30-34.9     Other emphysema 03/10/2023    Post-nasal drainage     Pulmonary arterial hypertension     Rash     Recurrent nephrolithiasis     Right rotator cuff tear     SOB (shortness of breath) on exertion     Tobacco chew use     Urinary tract infection     Viral infection     Vitamin D deficiency        Past Surgical History:   Procedure Laterality Date    CARDIAC CATHETERIZATION N/A 06/01/2022    Procedure: Left Heart Cath-Dr. Gill wants to do this to follow his device case;  Surgeon: ROBINSON Gill MD;  Location: [facility] CATH INVASIVE LOCATION;  Service: Cardiology;  Laterality: N/A;    CYSTOSCOPY URETHROTOMY VISUAL INTERNAL N/A 8/28/2024    Procedure: CYSTOSCOPY URETHROTOMY VISUAL INTERNAL;  Surgeon: Radha Monteiro MD;  Location: [facility] MAIN OR;  Service: Urology;  Laterality: N/A;    INGUINAL HERNIA REPAIR      ROTATOR CUFF REPAIR Right     SINUS SURGERY      FRACTURE REPAIR         Current Outpatient Medications:     albuterol sulfate  (90 Base) MCG/ACT inhaler, Inhale 2 puffs Every 6 (Six) Hours As Needed for Wheezing or Shortness of Air., Disp: 18 g, Rfl: 5    apixaban (ELIQUIS) 5 MG tablet tablet, Take 1 tablet by mouth 2 (Two) Times a Day., Disp: 60 tablet, Rfl: 5    aspirin 81 MG chewable tablet, Chew 1 tablet Daily. If urine clearing and not passing excessive clot, Disp: , Rfl:     Fluticasone-Umeclidin-Vilant  "(Juan Carloselíasevy Ellipta) 200-62.5-25 MCG/ACT inhaler, Inhale 1 puff Daily. Take 1 puff daily, Disp: 3 each, Rfl: 5    hydroCHLOROthiazide 25 MG tablet, Take 1 tablet by mouth Daily., Disp: 90 tablet, Rfl: 3    metFORMIN ER (GLUCOPHAGE-XR) 500 MG 24 hr tablet, TAKE 1 TABLET BY MOUTH EVERY MORNING WITH BREAKFAST, Disp: 90 tablet, Rfl: 3    ranolazine (RANEXA) 500 MG 12 hr tablet, Take 1 tablet by mouth 2 (Two) Times a Day., Disp: 180 tablet, Rfl: 3    rosuvastatin (CRESTOR) 20 MG tablet, Take 1 tablet by mouth every night at bedtime., Disp: 90 tablet, Rfl: 3    telmisartan (MICARDIS) 80 MG tablet, TAKE 1 TABLET BY MOUTH ONCE DAILY **due FOR office visit FOR further refills**, Disp: 90 tablet, Rfl: 3    torsemide (DEMADEX) 20 MG tablet, Take 1 tablet by mouth Daily., Disp: , Rfl:     Allergies   Allergen Reactions    Grass Rash        Family History   Problem Relation Age of Onset    COPD Mother     Diabetes Maternal Grandmother     Hypertension Paternal Grandfather     Colon cancer Neg Hx     Prostate cancer Neg Hx     Thyroid disease Neg Hx     Malig Hyperthermia Neg Hx        Social History     Socioeconomic History    Marital status:    Tobacco Use    Smoking status: Former     Current packs/day: 0.00     Average packs/day: 1.5 packs/day for 20.6 years (30.9 ttl pk-yrs)     Types: Cigarettes     Start date: 1965     Quit date: 3/23/1986     Years since quittin.0     Passive exposure: Past    Smokeless tobacco: Current     Types: Chew    Tobacco comments:     INST PER ANESTHESIA PROTOCOL-not within the last 24 hours   Vaping Use    Vaping status: Never Used   Substance and Sexual Activity    Alcohol use: Yes     Alcohol/week: 1.0 standard drink of alcohol     Types: 1 Cans of beer per week     Comment: occ    Drug use: No    Sexual activity: Yes     Partners: Female       Vital Signs:   Ht 188 cm (74\")   Wt 122 kg (270 lb)   BMI 34.67 kg/m²      Physical Exam     Result Review :   The following data was " reviewed by: NURY Michaels on 03/31/2025:  Results for orders placed or performed in visit on 03/31/25   Bladder Scan    Collection Time: 03/31/25  1:32 PM   Result Value Ref Range    Urine Volume 9ml    POC Urinalysis Dipstick, Automated    Collection Time: 03/31/25  1:41 PM    Specimen: Urine   Result Value Ref Range    Color Yellow Yellow, Straw, Dark Yellow, Shanti    Clarity, UA Clear Clear    Specific Gravity  1.020 1.005 - 1.030    pH, Urine 5.5 5.0 - 8.0    Leukocytes Small (1+) (A) Negative    Nitrite, UA Negative Negative    Protein, POC Negative Negative mg/dL    Glucose, UA Negative Negative mg/dL    Ketones, UA Negative Negative    Urobilinogen, UA 0.2 E.U./dL Normal, 0.2 E.U./dL    Bilirubin Negative Negative    Blood, UA Negative Negative    Lot Number [id number]     Expiration Date 9/2,025       PSA          3/24/2025    11:13   PSA   PSA 3.100      Bladder Scan interpretation 03/31/2025    Estimation of residual urine via [employer] 3000 Verathon Bladder Scan  MA/nurse performing: say leon Rn   Residual Urine: 9 ml  Indication: Benign prostatic hyperplasia with urinary hesitancy    Recurrent UTI   Position: Supine  Examination: Incremental scanning of the suprapubic area using 2.0 MHz transducer using copious amounts of acoustic gel.   Findings: An anechoic area was demonstrated which represented the bladder, with measurement of residual urine as noted. I inspected this myself. In that the residual urine was stable or insignificant, refer to plan for treatment and plan necessary at this time.                         Procedures        Assessment and Plan    Diagnoses and all orders for this visit:    1. Benign prostatic hyperplasia with urinary hesitancy (Primary)  -     Bladder Scan  -     POC Urinalysis Dipstick, Automated    2. Recurrent UTI  -     Pathnostics Guidance UTI -; Future      We discussed that UTI can be a common side effect of a urologic procedure and cannot be predicted.    He is  doing well and voiding well at this time.     No signs of UTI on his dip today.    He is emptying decent at this time.    Discussed his UTI symptoms were likely the cause of dehydration/ decreased immune response from the flu/ PE.     Will send in pcr cx.       I spent 10 minutes caring for Kennedy on this date of service. This time includes time spent by me in the following activities:reviewing tests, obtaining and/or reviewing a separately obtained history, performing a medically appropriate examination and/or evaluation , counseling and educating the patient/family/caregiver, ordering medications, tests, or procedures, and documenting information in the medical record  Follow Up   Return in about 3 months (around 6/30/2025) for f/u utis .  Patient was given instructions and counseling regarding his condition or for health maintenance advice. Please see specific information pulled into the AVS if appropriate.         This document has been electronically signed by NURY Michaels  March 31, 2025 14:35 EDT

## 2025-04-04 ENCOUNTER — TELEPHONE (OUTPATIENT)
Dept: INTERNAL MEDICINE | Age: 80
End: 2025-04-04

## 2025-04-04 ENCOUNTER — READMISSION MANAGEMENT (OUTPATIENT)
Dept: CALL CENTER | Facility: HOSPITAL | Age: 80
End: 2025-04-04
Payer: MEDICARE

## 2025-04-04 ENCOUNTER — TELEPHONE (OUTPATIENT)
Dept: UROLOGY | Age: 80
End: 2025-04-04
Payer: MEDICARE

## 2025-04-04 DIAGNOSIS — N35.914 STRICTURE OF ANTERIOR URETHRA IN MALE, UNSPECIFIED STRICTURE TYPE: ICD-10-CM

## 2025-04-04 DIAGNOSIS — N39.0 RECURRENT UTI: Primary | ICD-10-CM

## 2025-04-04 DIAGNOSIS — R31.0 GROSS HEMATURIA: ICD-10-CM

## 2025-04-04 NOTE — TELEPHONE ENCOUNTER
I spoke with NURY Mendez and she wanted me to call pack to see if they could run a urine culture while he is there and we are still awaiting his pathnositics results. I called the pathnositics rep and he still has not received his sample due to issues with [employer]. Grazyna just saw this patient on Monday and does not think he needs to come in for another appointment at this time. We are going to order a CT with and without abdomen/ pelvic with delayed imaging for the patient to get done. I placed that order. She said that if the culture that they run comes back and the CT is abnormal that we can proceed with doing another cystoscopy with Dr. Monteiro. I spoke with Madison who is talking care of the patient and she is going to order the culture to be done.

## 2025-04-04 NOTE — TELEPHONE ENCOUNTER
"  Caller: Kennedy Lin \"Dale\"    Relationship to patient: Self    Best call back number: [phone]     New or established patient?  [] New  [x] Established    Date of discharge: 04/01/25    Facility discharged from: [facility] [place], [place]    Diagnosis/Symptoms: UTI AND PNEUMONIA       "

## 2025-04-04 NOTE — TELEPHONE ENCOUNTER
Hub staff attempted to follow warm transfer process and was unsuccessful     Caller: NOLA HANNA     Relationship to patient:     Best call back number: [phone]    Patient is needing: NOLA IS CALLING TO SCHEDULE FOR A 1 WEEK F/U AND WE HAVE NOTHING TILL JUNE WAS CALLING OFFICE TO SEE IF THEY CAN SQUEEZE HIM IN . PLEASE GIVE [facility] A CALLBACK .

## 2025-04-04 NOTE — OUTREACH NOTE
Prep Survey      Flowsheet Row Responses   [facility] facility patient discharged from? Non-BH   Is LACE score < 7 ? Non- Discharge   Eligibility [facility]   Date of Admission 04/03/25   Date of Discharge 04/04/25   Discharge Disposition Home or Self Care   Discharge diagnosis Generalized weakness (Primary Dx),  Sepsis (HCC),  UTI (urinary tract infection)   Does the patient have one of the following disease processes/diagnoses(primary or secondary)? Sepsis   Prep survey completed? Yes            Becky FLEMING - Registered Nurse

## 2025-04-07 ENCOUNTER — TRANSITIONAL CARE MANAGEMENT TELEPHONE ENCOUNTER (OUTPATIENT)
Dept: CALL CENTER | Facility: HOSPITAL | Age: 80
End: 2025-04-07
Payer: MEDICARE

## 2025-04-07 NOTE — OUTREACH NOTE
Call Center TCM Note      Flowsheet Row Responses   [facility] patient discharged from? Non-BH  [[facility]]   Does the patient have one of the following disease processes/diagnoses(primary or secondary)? Other   TCM attempt successful? Yes   Call start time 1001   Call end time 1002   Discharge diagnosis Generalized weakness (Primary Dx),  Sepsis (HCC),  UTI (urinary tract infection)   Meds reviewed with patient/caregiver? Yes   Is the patient having any side effects they believe may be caused by any medication additions or changes? No   Does the patient have all medications ordered at discharge? Yes   Is the patient taking all medications as directed (includes completed medication regime)? Yes   Comments HOSP DC FU appt 4/10/25 1030 am   Does the patient have an appointment with their PCP within 7-14 days of discharge? Yes   Has home health visited the patient within 72 hours of discharge? N/A   Psychosocial issues? No   Did the patient receive a copy of their discharge instructions? Yes   Nursing interventions Reviewed instructions with patient   What is the patient's perception of their health status since discharge? Improving   Is the patient/caregiver able to teach back signs and symptoms related to disease process for when to call PCP? Yes   Is the patient/caregiver able to teach back signs and symptoms related to disease process for when to call 911? Yes   Is the patient/caregiver able to teach back the hierarchy of who to call/visit for symptoms/problems? PCP, Specialist, Home health nurse, Urgent Care, ED, 911 Yes   TCM call completed? Yes   Wrap up additional comments Pt reports he is doing much better. No needs.   Call end time 1002            CHICHI FLEMING - Registered Nurse    4/7/2025, 10:02 EDT

## 2025-04-08 ENCOUNTER — TELEPHONE (OUTPATIENT)
Dept: UROLOGY | Age: 80
End: 2025-04-08
Payer: MEDICARE

## 2025-04-08 NOTE — TELEPHONE ENCOUNTER
The [facility] received a fax that requires your attention. The document has been indexed to the patient’s chart for your review.      Reason for sending: HOSPITAL DISCHARGE CONTAINING MESSAGE    Documents Description: DISCHARGE SUMMARY - [facility] - 4.3.25    Name of Sender: [facility]    Date Indexed: 4.8.25    Notes (if needed): MESSAGE STATES TO CALL PT FOR 1 WEEK FOLLOW UP W/ VADIM ARRINGTON

## 2025-04-10 ENCOUNTER — OFFICE VISIT (OUTPATIENT)
Dept: INTERNAL MEDICINE | Age: 80
End: 2025-04-10
Payer: MEDICARE

## 2025-04-10 VITALS
TEMPERATURE: 98.2 F | DIASTOLIC BLOOD PRESSURE: 76 MMHG | WEIGHT: 262 LBS | OXYGEN SATURATION: 97 % | HEART RATE: 68 BPM | HEIGHT: 74 IN | BODY MASS INDEX: 33.62 KG/M2 | SYSTOLIC BLOOD PRESSURE: 143 MMHG

## 2025-04-10 DIAGNOSIS — E78.2 MIXED HYPERLIPIDEMIA: ICD-10-CM

## 2025-04-10 DIAGNOSIS — I10 ESSENTIAL HYPERTENSION: ICD-10-CM

## 2025-04-10 DIAGNOSIS — N18.31 STAGE 3A CHRONIC KIDNEY DISEASE: ICD-10-CM

## 2025-04-10 DIAGNOSIS — R97.20 ELEVATED PSA: ICD-10-CM

## 2025-04-10 DIAGNOSIS — R35.0 BENIGN PROSTATIC HYPERPLASIA WITH URINARY FREQUENCY: ICD-10-CM

## 2025-04-10 DIAGNOSIS — I25.10 CORONARY ARTERY DISEASE INVOLVING NATIVE CORONARY ARTERY OF NATIVE HEART WITHOUT ANGINA PECTORIS: ICD-10-CM

## 2025-04-10 DIAGNOSIS — J43.8 OTHER EMPHYSEMA: ICD-10-CM

## 2025-04-10 DIAGNOSIS — D72.829 LEUKOCYTOSIS, UNSPECIFIED TYPE: ICD-10-CM

## 2025-04-10 DIAGNOSIS — E11.9 TYPE 2 DIABETES MELLITUS TREATED WITHOUT INSULIN: ICD-10-CM

## 2025-04-10 DIAGNOSIS — A41.51 SEPSIS DUE TO ESCHERICHIA COLI, UNSPECIFIED WHETHER ACUTE ORGAN DYSFUNCTION PRESENT: Primary | ICD-10-CM

## 2025-04-10 DIAGNOSIS — N40.1 BENIGN PROSTATIC HYPERPLASIA WITH URINARY FREQUENCY: ICD-10-CM

## 2025-04-10 DIAGNOSIS — I50.32 CHRONIC DIASTOLIC (CONGESTIVE) HEART FAILURE: ICD-10-CM

## 2025-04-10 DIAGNOSIS — N30.00 ACUTE CYSTITIS WITHOUT HEMATURIA: ICD-10-CM

## 2025-04-10 DIAGNOSIS — E55.9 VITAMIN D DEFICIENCY: ICD-10-CM

## 2025-04-10 DIAGNOSIS — I26.99 OTHER ACUTE PULMONARY EMBOLISM WITHOUT ACUTE COR PULMONALE: ICD-10-CM

## 2025-04-10 DIAGNOSIS — R06.09 DYSPNEA ON EXERTION: ICD-10-CM

## 2025-04-10 RX ORDER — NITROFURANTOIN MACROCRYSTALS 50 MG/1
50 CAPSULE ORAL DAILY
Qty: 90 CAPSULE | Refills: 3 | Status: SHIPPED | OUTPATIENT
Start: 2025-04-10

## 2025-04-10 NOTE — PROGRESS NOTES
"Chief Complaint   Patient presents with    Hospital Follow Up Visit     [place] 4/3/2025 weakness, uti, pneumonia. Pt states feeling better. Pt states 3rd flare up of UTI.Second opinion request for Urology.    Was at urologist last Monday    Objective   Vital Signs  Vitals:    04/10/25 1013   BP: 143/76   BP Location: Left arm   Patient Position: Sitting   Pulse: 68   Temp: 98.2 °F (36.8 °C)   SpO2: 97%   Weight: 119 kg (262 lb)   Height: 188 cm (74.02\")      Body mass index is 33.62 kg/m².  Review of Systems   Constitutional: Negative.    HENT: Negative.     Eyes: Negative.    Respiratory: Negative.     Cardiovascular: Negative.    Gastrointestinal: Negative.    Endocrine: Negative.    Genitourinary:  Positive for dysuria.   Musculoskeletal: Negative.    Allergic/Immunologic: Negative.    Neurological:  Positive for weakness.   Hematological: Negative.    Psychiatric/Behavioral: Negative.        Physical Exam  Constitutional:       General: He is not in acute distress.     Appearance: Normal appearance. He is obese.   HENT:      Head: Normocephalic.      Mouth/Throat:      Mouth: Mucous membranes are moist.   Eyes:      Conjunctiva/sclera: Conjunctivae normal.      Pupils: Pupils are equal, round, and reactive to light.   Cardiovascular:      Rate and Rhythm: Normal rate and regular rhythm.      Pulses: Normal pulses.      Heart sounds: Normal heart sounds.   Pulmonary:      Effort: Pulmonary effort is normal.      Breath sounds: Normal breath sounds.   Abdominal:      General: Bowel sounds are normal.      Palpations: Abdomen is soft.   Musculoskeletal:         General: No swelling. Normal range of motion.      Cervical back: Neck supple.   Skin:     General: Skin is warm and dry.      Coloration: Skin is not jaundiced.   Neurological:      General: No focal deficit present.      Mental Status: He is alert and oriented to person, place, and time. Mental status is at baseline.   Psychiatric:         Mood and Affect: " Mood normal.         Behavior: Behavior normal.         Thought Content: Thought content normal.         Judgment: Judgment normal.        Result Review :   Lab Results   Component Value Date    PROBNP 149.0 03/24/2025    PROBNP 180.0 10/07/2024    PROBNP 405.0 01/17/2024     CMP          10/7/2024    10:32 2/3/2025    14:37 3/24/2025    11:13   CMP   Glucose 143  253  188    BUN 19  35  21    Creatinine 1.45  2.51  1.62    EGFR 49.3  25.5  43.2    Sodium 139  134  136    Potassium 4.9  4.3  4.4    Chloride 101  96  100    Calcium 9.2  9.2  9.8    Total Protein 6.6  6.8  6.9    Albumin 4.1  3.7  3.6    Globulin 2.5  3.1  3.3    Total Bilirubin 0.5  1.0  0.6    Alkaline Phosphatase 73  63  69    AST (SGOT) 16  11  13    ALT (SGPT) 19  22  13    Albumin/Globulin Ratio 1.6  1.2  1.1    BUN/Creatinine Ratio 13.1  13.9  13.0    Anion Gap 9.7  12.7  8.3      CBC w/diff          10/7/2024    10:32 2/3/2025    14:37 3/24/2025    11:13   CBC w/Diff   WBC 7.89  11.85  6.15    RBC 4.36  4.55  3.92    Hemoglobin 13.3  14.1  11.8    Hematocrit 41.7  42.1  37.2    MCV 95.6  92.5  94.9    MCH 30.5  31.0  30.1    MCHC 31.9  33.5  31.7    RDW 13.0  13.1  13.5    Platelets 150  215  223    Neutrophil Rel % 55.5  72.2  57.8    Immature Granulocyte Rel % 0.1  1.9  0.3    Lymphocyte Rel % 28.6  11.8  27.5    Monocyte Rel % 11.0  13.1  11.2    Eosinophil Rel % 4.4  0.4  2.9    Basophil Rel % 0.4  0.6  0.3       Lipid Panel          6/11/2024    11:43 10/7/2024    10:32 3/24/2025    11:13   Lipid Panel   Total Cholesterol 89  89  104    Triglycerides 154  100  228    HDL Cholesterol 30  31  29    VLDL Cholesterol 26  19  36    LDL Cholesterol  33  39  39    LDL/HDL Ratio 0.94  1.23  1.01       Lab Results   Component Value Date    TSH 4.200 06/11/2024    TSH 1.220 01/17/2024    TSH 1.750 04/28/2023      Lab Results   Component Value Date    FREET4 1.11 06/11/2024    FREET4 1.09 01/17/2024      A1C Last 3 Results          6/11/2024     11:43 10/7/2024    10:32 3/24/2025    11:13   HGBA1C Last 3 Results   Hemoglobin A1C 6.60  6.40  8.50       PSA          3/24/2025    11:13   PSA   PSA 3.100                     Visit Diagnoses:    ICD-10-CM ICD-9-CM   1. Sepsis due to Escherichia coli, unspecified whether acute organ dysfunction present  A41.51 038.42     995.91   2. Leukocytosis, unspecified type  D72.829 288.60   3. Stage 3a chronic kidney disease  N18.31 585.3   4. Other emphysema  J43.8 492.8   5. Elevated PSA  R97.20 790.93   6. Type 2 diabetes mellitus treated without insulin  E11.9 250.00   7. Dyspnea on exertion  R06.09 786.09   8. Coronary artery disease (moderate per cath but not stented)   I25.10 414.01   9. Essential hypertension  I10 401.9   10. Mixed hyperlipidemia  E78.2 272.2   11. Other acute pulmonary embolism without acute cor pulmonale  I26.99 415.19   12. Benign prostatic hyperplasia with urinary frequency  N40.1 600.01    R35.0 788.41   13. Chronic diastolic (congestive) heart failure  I50.32 428.32     428.0   14. Vitamin D deficiency  E55.9 268.9   15. Acute cystitis without hematuria  N30.00 595.0       Assessment and Plan   Diagnoses and all orders for this visit:    1. Sepsis due to Escherichia coli, unspecified whether acute organ dysfunction present (Primary)  -     CBC & Differential; Future  -     proBNP; Future  -     Magnesium; Future  -     Comprehensive Metabolic Panel; Future  -     Lipid Panel; Future  -     Hemoglobin A1c; Future  -     TSH+Free T4; Future  -     Ambulatory Referral to Urology    2. Leukocytosis, unspecified type  -     CBC & Differential; Future  -     proBNP; Future  -     Magnesium; Future  -     Comprehensive Metabolic Panel; Future  -     Lipid Panel; Future  -     Hemoglobin A1c; Future  -     TSH+Free T4; Future    3. Stage 3a chronic kidney disease  -     CBC & Differential; Future  -     proBNP; Future  -     Magnesium; Future  -     Comprehensive Metabolic Panel; Future  -     Lipid  Panel; Future  -     Hemoglobin A1c; Future  -     TSH+Free T4; Future    4. Other emphysema  -     CBC & Differential; Future  -     proBNP; Future  -     Magnesium; Future  -     Comprehensive Metabolic Panel; Future  -     Lipid Panel; Future  -     Hemoglobin A1c; Future  -     TSH+Free T4; Future    5. Elevated PSA  -     CBC & Differential; Future  -     proBNP; Future  -     Magnesium; Future  -     Comprehensive Metabolic Panel; Future  -     Lipid Panel; Future  -     Hemoglobin A1c; Future  -     TSH+Free T4; Future  -     Ambulatory Referral to Urology    6. Type 2 diabetes mellitus treated without insulin  -     CBC & Differential; Future  -     proBNP; Future  -     Magnesium; Future  -     Comprehensive Metabolic Panel; Future  -     Lipid Panel; Future  -     Hemoglobin A1c; Future  -     TSH+Free T4; Future    7. Dyspnea on exertion  -     CBC & Differential; Future  -     proBNP; Future  -     Magnesium; Future  -     Comprehensive Metabolic Panel; Future  -     Lipid Panel; Future  -     Hemoglobin A1c; Future  -     TSH+Free T4; Future    8. Coronary artery disease (moderate per cath but not stented)   -     CBC & Differential; Future  -     proBNP; Future  -     Magnesium; Future  -     Comprehensive Metabolic Panel; Future  -     Lipid Panel; Future  -     Hemoglobin A1c; Future  -     TSH+Free T4; Future    9. Essential hypertension  -     CBC & Differential; Future  -     proBNP; Future  -     Magnesium; Future  -     Comprehensive Metabolic Panel; Future  -     Lipid Panel; Future  -     Hemoglobin A1c; Future  -     TSH+Free T4; Future    10. Mixed hyperlipidemia  -     CBC & Differential; Future  -     proBNP; Future  -     Magnesium; Future  -     Comprehensive Metabolic Panel; Future  -     Lipid Panel; Future  -     Hemoglobin A1c; Future  -     TSH+Free T4; Future    11. Other acute pulmonary embolism without acute cor pulmonale  -     CBC & Differential; Future  -     proBNP; Future  -      Magnesium; Future  -     Comprehensive Metabolic Panel; Future  -     Lipid Panel; Future  -     Hemoglobin A1c; Future  -     TSH+Free T4; Future    12. Benign prostatic hyperplasia with urinary frequency  -     CBC & Differential; Future  -     proBNP; Future  -     Magnesium; Future  -     Comprehensive Metabolic Panel; Future  -     Lipid Panel; Future  -     Hemoglobin A1c; Future  -     TSH+Free T4; Future    13. Chronic diastolic (congestive) heart failure  -     CBC & Differential; Future  -     proBNP; Future  -     Magnesium; Future  -     Comprehensive Metabolic Panel; Future  -     Lipid Panel; Future  -     Hemoglobin A1c; Future  -     TSH+Free T4; Future    14. Vitamin D deficiency  -     CBC & Differential; Future  -     proBNP; Future  -     Magnesium; Future  -     Comprehensive Metabolic Panel; Future  -     Lipid Panel; Future  -     Hemoglobin A1c; Future  -     TSH+Free T4; Future    15. Acute cystitis without hematuria  -     CBC & Differential; Future  -     proBNP; Future  -     Magnesium; Future  -     Comprehensive Metabolic Panel; Future  -     Lipid Panel; Future  -     Hemoglobin A1c; Future  -     TSH+Free T4; Future  -     Ambulatory Referral to Urology    Other orders  -     nitrofurantoin (Macrodantin) 50 MG capsule; Take 1 capsule by mouth Daily.  Dispense: 90 capsule; Refill: 3    Sepsis, UTI recurrent, E. 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 was noted,    Acute PE, mainly right lung with apparent right heart strain multiple emboli, =====ADMITTED TO [facility] [place], treated with Eliquis, etiology for the blood clots is undetermined at this point,===VELE ==NEG MARCH 7, 2025,  initial chest x-ray showed no active disease====== shortness of breath improved considerably after starting anticoagulation,    UTI recurrent treatment ongoing March 7, 2025 at [facility] ==and then upon transfer at [place], CT abdomen pelvis currently pending April 2025 and previous CT September 7, 2024, showing urinary bladder wall thickening no kidney stones renal cyst and hepatic cyst noted consider suppressive therapy with Macrodantin 50 mg daily, refer second opinion urology Dr. Dimitri [facility], April 10, 2025    DLEANEY , HYPOTENSION FEB 3, 2025 AND DX WITH DEHYDRATION AND FLU A  RX WITH TAMIFLU-- ALSO DX WITH UTI AND RX WITH KEFLEX PER ER.     cystoscopy with ureterotomy August 28, 2024 post procedure had a catheter in for about 10 days when they took that out he developed chills sepsis fever and UTI with acute kidney injury had to go back in the hospital with IV antibiotics and was sent home on oral Levaquin and just finished that course around September 16, 2024, resolved as of October 14, 2024    Annual wellness visit questionnaire completed June 11, 2024,    Shortness of breath upper back and neck and chest tightness, == had labs October 7, 2024 proBNP 180, continues hydrochlorothiazide 25 mg daily---nucl, stress test ==normal nov 4, 2024,  , echo-- mod diastolic dys, nl lv , aortic valve sclerosis,      Hematuria/ dysuria --patient does follow-up with Dr. Santana urology,  CT abdomen and pelvis March 4, 2024,, mild bladder wall thickening and haziness of the perivesicular fat suggesting possible cystitis there is some haziness around the prostate gland suggesting possible prostatitis there was some renal and hepatic cysts colonic diverticulosis a slight irregularity to the abdominal aorta 2.9 cm but no kidney stones or solid renal masses-----had cystoscopy April 18, 2024==back in hospital aug 28, 2024 as above     Ckd 3a     Cad, cath, --no stents, med mgt.=== Dr harvey, ---cont ranexa 500 mg bid ,  crestor 20 mg qd , coq -10     Copd--ct chest low dose , normal march 2023,,, CT chest low-dose screening April 19, 2024 shows mild emphysema no active airways disease aortic and severe calcified coronary atherosclerotic disease otherwise negative, recommend 1 year follow-up --------------------------------continues trelegy daily , prn albuterol hfa,   ----chest x-ray January 15, 2024 shows blunting posterior costophrenic sulcus on the left, shadows in the lower lungs could relate to cardiogenic cardiophrenic fat pads degenerative changes of the dorsal spine otherwise    Lower extremity edema 2-3+,, proBNP level 180 October 7, 2024    Pulm htn --cont ?      Htn cont telmisartan 80 mg qd , hctz 25 mg qd     Bph, --does see urology    Dm 2 , cont  metformin extended release 500 mg daily ==hga1c = 6.4 October 7, 2024, urine microalbumin was - neg ---January 17, 2024-====off glipizide also March 17, 2025    Overwgt , == off steroids   now ,      Vit d def mild jan 2024     Psa 1.8, jan 2024                       Follow Up   Return in about 3 months (around 7/10/2025).  Patient was given instructions and counseling regarding his condition or for health maintenance advice. Please see specific information pulled into the AVS if appropriate.

## 2025-04-14 ENCOUNTER — TELEPHONE (OUTPATIENT)
Dept: CARDIOLOGY | Facility: CLINIC | Age: 80
End: 2025-04-14

## 2025-04-14 NOTE — TELEPHONE ENCOUNTER
"      Hub staff attempted to follow warm transfer process and was unsuccessful     Caller: Kennedy Lin \"Dale\"    Relationship to patient: Self    Best call back number:   Telephone Information:   Mobile [phone]         Patient is needing: PATIENT CALLING JOHN PAUL BACK            "

## 2025-04-16 ENCOUNTER — TELEPHONE (OUTPATIENT)
Dept: CARDIOLOGY | Age: 80
End: 2025-04-16

## 2025-04-16 NOTE — TELEPHONE ENCOUNTER
"Hub staff attempted to follow warm transfer process and was unsuccessful     Caller: Kennedy Lin \"Dale\"    Relationship to patient: Self    Best call back number: [phone]     Patient is needing: PT RETURNING CALL TO SCHEDULE APPT WITH DR SANTOS - PT STATES HE WAS TRANSFERRED AND IT ONLY RANG - HE IS ASKING FOR CALL BACK TO SCHEDULE ASAP  - PT STATES HE IS AVAILABLE ON MONDAYS, TUESDAYS AND FRIDAYS THE EASIEST - NO PREFERENCE ON TIMEFRAME BUT HE IS UNABLE TO COME ON THURSDAYS IF POSSIBLE TO AVOID -   "

## 2025-04-28 ENCOUNTER — OFFICE VISIT (OUTPATIENT)
Dept: CARDIOLOGY | Facility: CLINIC | Age: 80
End: 2025-04-28
Payer: MEDICARE

## 2025-04-28 VITALS
HEART RATE: 69 BPM | BODY MASS INDEX: 35.73 KG/M2 | HEIGHT: 74 IN | DIASTOLIC BLOOD PRESSURE: 76 MMHG | SYSTOLIC BLOOD PRESSURE: 130 MMHG | WEIGHT: 278.4 LBS

## 2025-04-28 DIAGNOSIS — I50.32 CHRONIC HEART FAILURE WITH PRESERVED EJECTION FRACTION (HFPEF): Primary | ICD-10-CM

## 2025-04-28 PROCEDURE — 99204 OFFICE O/P NEW MOD 45 MIN: CPT | Performed by: INTERNAL MEDICINE

## 2025-04-28 PROCEDURE — 1160F RVW MEDS BY RX/DR IN RCRD: CPT | Performed by: INTERNAL MEDICINE

## 2025-04-28 PROCEDURE — 3075F SYST BP GE 130 - 139MM HG: CPT | Performed by: INTERNAL MEDICINE

## 2025-04-28 PROCEDURE — 3078F DIAST BP <80 MM HG: CPT | Performed by: INTERNAL MEDICINE

## 2025-04-28 PROCEDURE — 1159F MED LIST DOCD IN RCRD: CPT | Performed by: INTERNAL MEDICINE

## 2025-04-28 RX ORDER — FERROUS SULFATE 325(65) MG
TABLET ORAL
COMMUNITY
Start: 2025-04-04

## 2025-04-28 NOTE — PROGRESS NOTES
Subjective:     Encounter Date:04/28/2025      Patient ID: Kennedy Lin is a 79 y.o. male.    Chief Complaint: CAD, PE  HPI:   This is a very pleasant 79-year-old man who presents for evaluation.  He was previously followed by Dr. Gill in [place].  In 2022 he had exertional dyspnea and underwent cardiac catheterization showing moderate coronary disease.  He was started on aspirin and Ranexa at that time for concern for angina coming from a small to medium sized OM.  He has a history of COPD and is on Trelegy, follows with pulmonology in [place].  He was hospitalized a few times this year for sepsis related to UTI.  During 1 of those hospitalizations he was found to have right-sided PE without RV strain.Echocardiogram was performed which showed no significant RV  or LV dysfunction at that time while hospitalized at [place].    He has chronic lower extremity edema which is not bothersome to him.  He is on both torsemide and HCTZ in hopes to reduce his pill burden.  He wonders why he needs Ranexa.  He notes that he has chronic exertional dyspnea which has improved since he started walking for 30 minutes daily.  He can now walk much more easily though if he walks with more speech then he does have to stop.  He denies angina.  He notes no improvement on Ranexa.    The following portions of the patient's history were reviewed and updated as appropriate: allergies, current medications, past family history, past medical history, past social history, past surgical history and problem list.     REVIEW OF SYSTEMS:   All systems reviewed.  Pertinent positives identified in HPI.  All other systems are negative.    Past Medical History:   Diagnosis Date    Acute pulmonary embolism 03/17/2025    Allergic rhinitis     Asthma     Broken bones     Coccygeal fracture     Coronary artery disease     PRISCILLA NO HX MI OR INTERVENTION    Deep vein thrombosis [id number]    Diabetes mellitus, type 2     Elevated hemoglobin  A1c     Encounter for Medicare annual wellness exam     History of kidney stones     Hyperlipidemia     Hypertension     Lumbar vertebral fracture     Malignant melanoma of skin     Obesity, Class I, BMI 30-34.9     Other emphysema 03/10/2023    Post-nasal drainage     Pulmonary arterial hypertension     Rash     Recurrent nephrolithiasis     Right rotator cuff tear     SOB (shortness of breath) on exertion     Tobacco chew use     Urinary tract infection     Viral infection     Vitamin D deficiency        Family History   Problem Relation Age of Onset    COPD Mother     Diabetes Maternal Grandmother     Hypertension Paternal Grandfather     Colon cancer Neg Hx     Prostate cancer Neg Hx     Thyroid disease Neg Hx     Malig Hyperthermia Neg Hx        Social History     Socioeconomic History    Marital status:     Number of children: 2   Tobacco Use    Smoking status: Former     Current packs/day: 0.00     Average packs/day: 1.5 packs/day for 20.6 years (30.9 ttl pk-yrs)     Types: Cigarettes     Start date: 1965     Quit date: 3/23/1986     Years since quittin.1     Passive exposure: Past    Smokeless tobacco: Current     Types: Chew    Tobacco comments:     INST PER ANESTHESIA PROTOCOL-not within the last 24 hours   Vaping Use    Vaping status: Never Used   Substance and Sexual Activity    Alcohol use: Yes     Alcohol/week: 1.0 standard drink of alcohol     Types: 1 Cans of beer per week     Comment: occ    Drug use: No    Sexual activity: Yes     Partners: Female       Allergies   Allergen Reactions    Grass Rash       Past Surgical History:   Procedure Laterality Date    CARDIAC CATHETERIZATION N/A 2022    Procedure: Left Heart Cath-Dr. Gill wants to do this to follow his device case;  Surgeon: ROBINSON Gill MD;  Location: [facility] CATH INVASIVE LOCATION;  Service: Cardiology;  Laterality: N/A;    CYSTOSCOPY URETHROTOMY VISUAL INTERNAL N/A 2024    Procedure: CYSTOSCOPY URETHROTOMY  VISUAL INTERNAL;  Surgeon: Radha Monteiro MD;  Location: [facility] OR;  Service: Urology;  Laterality: N/A;    INGUINAL HERNIA REPAIR      ROTATOR CUFF REPAIR Right     SINUS SURGERY      FRACTURE REPAIR       Procedures       Objective:         PHYSICAL EXAM:  GEN: VSS, no distress,   Eyes: normal sclera, normal lids and lashes  HENT: moist mucus membranes,   Respiratory: CTAB, no rales or wheezes  CV: RRR, no murmurs, , +2 DP and 2+ carotid pulses b/l  GI: NABS, soft,  Nontender, nondistended  MSK: no edema, no scoliosis or kyphosis  Skin: no rash, warm, dry  Heme/Lymph: no bruising or bleeding  Psych: organized thought, normal behavior and affect  Neuro: Cranial nerves grossly intact, Alert and Oriented x 3.         Assessment:          Diagnosis Plan   1. Chronic heart failure with preserved ejection fraction (HFpEF)           Plan:       1.  Coronary disease: No significant coronary disease.  There is a borderline appearing OM lesion which has been treated medically.  He did not respond to Ranexa.  Will stop this today.  Continue aspirin and statin.  2.  Exertional dyspnea likely related to diastolic dysfunction, obesity, deconditioning. Has improved with daily exercise over the last several months.   3.  Diastolic dysfunction: Lungs are clear today.  He does have 2+ lower extremity edema at the sock line.  He wants to decrease his pill burden will stop HCTZ and continue torsemide 20.  His renal function is improving after recent hospitalization for urosepsis.  Can increase torsemide to 40 if he were to worsen in terms of dyspnea or edema.    Dr. Kumar, thank you very much for referring this kind patient to me. Please call me with any questions or concerns. I will see the patient again in the office in 2 months.      Arlen Arciniega MD  04/28/25  [facility]    Outpatient Encounter Medications as of 4/28/2025   Medication Sig Dispense Refill    albuterol sulfate  (90 Base) MCG/ACT  inhaler Inhale 2 puffs Every 6 (Six) Hours As Needed for Wheezing or Shortness of Air. 18 g 5    apixaban (ELIQUIS) 5 MG tablet tablet Take 1 tablet by mouth 2 (Two) Times a Day. 60 tablet 5    aspirin 81 MG chewable tablet Chew 1 tablet Daily. If urine clearing and not passing excessive clot      FeroSul 325 (65 Fe) MG tablet TAKE 1 TABLET BY MOUTH 3 times WEEKLY ON monday, WEDNESDAY AND FRIDAY FOR 30 DAYS.      Fluticasone-Umeclidin-Vilant (Trelegy Ellipta) 200-62.5-25 MCG/ACT inhaler Inhale 1 puff Daily. Take 1 puff daily 3 each 5    hydroCHLOROthiazide 25 MG tablet Take 1 tablet by mouth Daily. 90 tablet 3    metFORMIN ER (GLUCOPHAGE-XR) 500 MG 24 hr tablet TAKE 1 TABLET BY MOUTH EVERY MORNING WITH BREAKFAST 90 tablet 3    nitrofurantoin (Macrodantin) 50 MG capsule Take 1 capsule by mouth Daily. 90 capsule 3    ranolazine (RANEXA) 500 MG 12 hr tablet Take 1 tablet by mouth 2 (Two) Times a Day. 180 tablet 3    rosuvastatin (CRESTOR) 20 MG tablet Take 1 tablet by mouth every night at bedtime. 90 tablet 3    telmisartan (MICARDIS) 80 MG tablet TAKE 1 TABLET BY MOUTH ONCE DAILY **due FOR office visit FOR further refills** 90 tablet 3     No facility-administered encounter medications on file as of 4/28/2025.

## 2025-05-09 ENCOUNTER — EXTERNAL PBMM DATA (OUTPATIENT)
Dept: PHARMACY | Facility: OTHER | Age: 80
End: 2025-05-09
Payer: MEDICARE

## 2025-05-28 DIAGNOSIS — R06.09 DYSPNEA ON EXERTION: ICD-10-CM

## 2025-05-28 DIAGNOSIS — J43.2 CENTRILOBULAR EMPHYSEMA: ICD-10-CM

## 2025-05-28 NOTE — TELEPHONE ENCOUNTER
"    Caller: Kennedy Lin \"Dale\"    Relationship: Self    Best call back number:[phone]      Requested Prescriptions:   Requested Prescriptions     Pending Prescriptions Disp Refills    Fluticasone-Umeclidin-Vilant (Trelegy Ellipta) 200-62.5-25 MCG/ACT inhaler 3 each 5     Sig: Inhale 1 puff Daily. Take 1 puff daily        Pharmacy where request should be sent:    [facility] [address] [phone] [facility] [phone]    Last office visit with prescribing clinician: Visit date not found   Last telemedicine visit with prescribing clinician: Visit date not found   Next office visit with prescribing clinician: 8/21/2025     Additional details provided by patient:     Does the patient have less than a 3 day supply:  [x] Yes  [] No    Would you like a call back once the refill request has been completed: [] Yes [] No    If the office needs to give you a call back, can they leave a voicemail: [] Yes [] No    Marie Centeno Rep   05/28/25 14:54 EDT       "

## 2025-05-29 ENCOUNTER — HOSPITAL ENCOUNTER (OUTPATIENT)
Dept: CT IMAGING | Facility: HOSPITAL | Age: 80
Discharge: HOME | End: 2025-05-29
Admitting: NURSE PRACTITIONER
Payer: MEDICARE

## 2025-05-29 DIAGNOSIS — R31.0 GROSS HEMATURIA: ICD-10-CM

## 2025-05-29 DIAGNOSIS — N39.0 RECURRENT UTI: ICD-10-CM

## 2025-05-29 DIAGNOSIS — N35.914 STRICTURE OF ANTERIOR URETHRA IN MALE, UNSPECIFIED STRICTURE TYPE: ICD-10-CM

## 2025-05-29 DIAGNOSIS — I10 ESSENTIAL HYPERTENSION: ICD-10-CM

## 2025-05-29 LAB
CREAT BLDA-MCNC: 1.1 MG/DL (ref 0.6–1.3)
EGFRCR SERPLBLD CKD-EPI 2021: 68.7 ML/MIN/1.73

## 2025-05-29 PROCEDURE — 74178 CT ABD&PLV WO CNTR FLWD CNTR: CPT

## 2025-05-29 PROCEDURE — 82565 ASSAY OF CREATININE: CPT

## 2025-05-29 PROCEDURE — 25510000001 IOPAMIDOL PER 1 ML: Performed by: NURSE PRACTITIONER

## 2025-05-29 RX ORDER — HYDROCHLOROTHIAZIDE 25 MG/1
25 TABLET ORAL DAILY
Qty: 90 TABLET | Refills: 3 | Status: SHIPPED | OUTPATIENT
Start: 2025-05-29

## 2025-05-29 RX ORDER — FLUTICASONE FUROATE, UMECLIDINIUM BROMIDE AND VILANTEROL TRIFENATATE 200; 62.5; 25 UG/1; UG/1; UG/1
1 POWDER RESPIRATORY (INHALATION) DAILY
Qty: 3 EACH | Refills: 5 | Status: SHIPPED | OUTPATIENT
Start: 2025-05-29

## 2025-05-29 RX ORDER — IOPAMIDOL 755 MG/ML
100 INJECTION, SOLUTION INTRAVASCULAR
Status: COMPLETED | OUTPATIENT
Start: 2025-05-29 | End: 2025-05-29

## 2025-05-29 RX ADMIN — IOPAMIDOL 100 ML: 755 INJECTION, SOLUTION INTRAVENOUS at 13:32

## 2025-05-29 NOTE — TELEPHONE ENCOUNTER
MEETS PROTOCOL;      Diuretics Protocol [id number]/29/2025 12:00 PM   Protocol Details Normal serum potassium in past 12 months    Normal serum sodium in past 12 months    Recent or future visit with authorizing provider    Blood pressure on record    GFR> 30 ml/min in past year          MATCHES LAST OV 3/20/25    NEXT OV NOT SCHEDULED

## 2025-06-03 ENCOUNTER — RESULTS FOLLOW-UP (OUTPATIENT)
Dept: UROLOGY | Age: 80
End: 2025-06-03
Payer: MEDICARE

## 2025-06-03 DIAGNOSIS — N39.0 RECURRENT UTI: Primary | ICD-10-CM

## 2025-06-03 DIAGNOSIS — R31.0 GROSS HEMATURIA: ICD-10-CM

## 2025-06-03 NOTE — TELEPHONE ENCOUNTER
I spoke with the patient in regards to NURY Mendez message, she would like for the patient to leave a urine specimen with the lab. He states that he will do that this week. He had no further follow up questions.

## 2025-06-03 NOTE — PROGRESS NOTES
He denies any urinary symptoms at this time. He states that he is unsure why he had a referral for [facility] and plans on continuing care with you unless you guys discuss this otherwise. He states that he has also never seen them?

## 2025-06-03 NOTE — PROGRESS NOTES
Thanks!  It appears that his primary care recommended a referral to a tertiary center for second opinion.  Given CT findings I would recommend that he go ahead and proceed with a urinalysis and a culture just to make sure we are warding off any sepsis.

## 2025-06-10 ENCOUNTER — TELEPHONE (OUTPATIENT)
Dept: INTERNAL MEDICINE | Age: 80
End: 2025-06-10

## 2025-06-10 ENCOUNTER — OFFICE VISIT (OUTPATIENT)
Dept: INTERNAL MEDICINE | Age: 80
End: 2025-06-10
Payer: MEDICARE

## 2025-06-10 VITALS
HEART RATE: 70 BPM | SYSTOLIC BLOOD PRESSURE: 136 MMHG | HEIGHT: 74 IN | BODY MASS INDEX: 36.63 KG/M2 | WEIGHT: 285.4 LBS | DIASTOLIC BLOOD PRESSURE: 60 MMHG | OXYGEN SATURATION: 97 % | TEMPERATURE: 98.4 F

## 2025-06-10 DIAGNOSIS — I26.99 OTHER ACUTE PULMONARY EMBOLISM WITHOUT ACUTE COR PULMONALE: ICD-10-CM

## 2025-06-10 DIAGNOSIS — N18.31 STAGE 3A CHRONIC KIDNEY DISEASE: ICD-10-CM

## 2025-06-10 DIAGNOSIS — R06.09 DYSPNEA ON EXERTION: ICD-10-CM

## 2025-06-10 DIAGNOSIS — I50.32 CHRONIC DIASTOLIC (CONGESTIVE) HEART FAILURE: Primary | ICD-10-CM

## 2025-06-10 DIAGNOSIS — I10 ESSENTIAL HYPERTENSION: ICD-10-CM

## 2025-06-10 RX ORDER — TORSEMIDE 20 MG/1
20 TABLET ORAL DAILY
Qty: 30 TABLET | Refills: 5 | Status: SHIPPED | OUTPATIENT
Start: 2025-06-10

## 2025-06-10 RX ORDER — POTASSIUM CHLORIDE 750 MG/1
10 TABLET, EXTENDED RELEASE ORAL DAILY
Qty: 30 TABLET | Refills: 5 | Status: SHIPPED | OUTPATIENT
Start: 2025-06-10

## 2025-06-10 RX ORDER — BUDESONIDE AND FORMOTEROL FUMARATE DIHYDRATE 160; 4.5 UG/1; UG/1
2 AEROSOL RESPIRATORY (INHALATION)
COMMUNITY
Start: 2025-03-09

## 2025-06-10 NOTE — PROGRESS NOTES
"Chief Complaint   Patient presents with    Leg Swelling     Pt C/O of b/l leg swelling w/ hx of blood clots, pain in left leg.   Sx worsened ~ 3 weeks ago.    Patient is still on Eliquis, he is also on a water pill, and an aspirin,  , Concerned about the swelling in the leg, neuropathy type symptoms also,  Cardiology took him off hydrochlorothiazide  Objective   Vital Signs  Vitals:    06/10/25 1457   BP: 136/60   BP Location: Left arm   Patient Position: Sitting   Cuff Size: Adult   Pulse: 70   Temp: 98.4 °F (36.9 °C)   TempSrc: Skin   SpO2: 97%   Weight: 129 kg (285 lb 6.4 oz)   Height: 188 cm (74\")      Body mass index is 36.64 kg/m².  Review of Systems swelling left leg greater than right breathing is getting better,  Physical Exam patient has 3+ to 4+ edema in the left lower leg from mid calf down 2+ to 3+ on the right lower leg from the mid calf down around the ankle and feet also, lungs are clear posterior and anterior cardiac exam regular rhythm alert and oriented x 3  Result Review :   Lab Results   Component Value Date    PROBNP 149.0 03/24/2025    PROBNP 180.0 10/07/2024    PROBNP 405.0 01/17/2024     CMP          2/3/2025    14:37 3/24/2025    11:13 5/29/2025    13:31   CMP   Glucose 253  188     BUN 35  21     Creatinine 2.51  1.62  1.10    EGFR 25.5  43.2  68.7    Sodium 134  136     Potassium 4.3  4.4     Chloride 96  100     Calcium 9.2  9.8     Total Protein 6.8  6.9     Albumin 3.7  3.6     Globulin 3.1  3.3     Total Bilirubin 1.0  0.6     Alkaline Phosphatase 63  69     AST (SGOT) 11  13     ALT (SGPT) 22  13     Albumin/Globulin Ratio 1.2  1.1     BUN/Creatinine Ratio 13.9  13.0     Anion Gap 12.7  8.3       CBC w/diff          10/7/2024    10:32 2/3/2025    14:37 3/24/2025    11:13   CBC w/Diff   WBC 7.89  11.85  6.15    RBC 4.36  4.55  3.92    Hemoglobin 13.3  14.1  11.8    Hematocrit 41.7  42.1  37.2    MCV 95.6  92.5  94.9    MCH 30.5  31.0  30.1    MCHC 31.9  33.5  31.7    RDW 13.0  13.1  " 13.5    Platelets 150  215  223    Neutrophil Rel % 55.5  72.2  57.8    Immature Granulocyte Rel % 0.1  1.9  0.3    Lymphocyte Rel % 28.6  11.8  27.5    Monocyte Rel % 11.0  13.1  11.2    Eosinophil Rel % 4.4  0.4  2.9    Basophil Rel % 0.4  0.6  0.3       Lipid Panel          10/7/2024    10:32 3/24/2025    11:13   Lipid Panel   Total Cholesterol 89  104    Triglycerides 100  228    HDL Cholesterol 31  29    VLDL Cholesterol 19  36    LDL Cholesterol  39  39    LDL/HDL Ratio 1.23  1.01       Lab Results   Component Value Date    TSH 4.200 06/11/2024    TSH 1.220 01/17/2024    TSH 1.750 04/28/2023      Lab Results   Component Value Date    FREET4 1.11 06/11/2024    FREET4 1.09 01/17/2024      A1C Last 3 Results          10/7/2024    10:32 3/24/2025    11:13   HGBA1C Last 3 Results   Hemoglobin A1C 6.40  8.50       PSA          3/24/2025    11:13   PSA   PSA 3.100                     Visit Diagnoses:    ICD-10-CM ICD-9-CM   1. Chronic diastolic (congestive) heart failure  I50.32 428.32     428.0   2. Essential hypertension  I10 401.9   3. Dyspnea on exertion  R06.09 786.09   4. Other acute pulmonary embolism without acute cor pulmonale  I26.99 415.19   5. Stage 3a chronic kidney disease  N18.31 585.3       Assessment and Plan   Diagnoses and all orders for this visit:    1. Chronic diastolic (congestive) heart failure (Primary)  -     Magnesium; Future  -     Comprehensive Metabolic Panel; Future  -     proBNP; Future  -     CBC & Differential; Future  -     Duplex Venous Lower Extremity - Bilateral CAR; Future    2. Essential hypertension  -     Magnesium; Future  -     Comprehensive Metabolic Panel; Future  -     proBNP; Future  -     CBC & Differential; Future  -     Duplex Venous Lower Extremity - Bilateral CAR; Future    3. Dyspnea on exertion  -     Magnesium; Future  -     Comprehensive Metabolic Panel; Future  -     proBNP; Future  -     CBC & Differential; Future  -     Duplex Venous Lower Extremity -  Bilateral CAR; Future    4. Other acute pulmonary embolism without acute cor pulmonale  -     Magnesium; Future  -     Comprehensive Metabolic Panel; Future  -     proBNP; Future  -     CBC & Differential; Future  -     Duplex Venous Lower Extremity - Bilateral CAR; Future    5. Stage 3a chronic kidney disease  -     Magnesium; Future  -     Comprehensive Metabolic Panel; Future  -     proBNP; Future  -     CBC & Differential; Future  -     Duplex Venous Lower Extremity - Bilateral CAR; Future    Other orders  -     torsemide (DEMADEX) 20 MG tablet; Take 1 tablet by mouth Daily.  Dispense: 30 tablet; Refill: 5  -     potassium chloride 10 MEQ CR tablet; Take 1 tablet by mouth Daily.  Dispense: 30 tablet; Refill: 5      Lower extremity swelling edema, worsening specially left leg will check venous evaluation today stat start torsemide 20 mg daily patient is to go back on HCTZ to take 1 hour before he takes the torsemide every day he is to elevate his feet for the next 2 to 3 days, as below, discussed with patient Carley 10, 2025      Sepsis, UTI recurrent, E. 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 was noted,    Acute PE, mainly right lung with apparent right heart strain multiple emboli, =====ADMITTED TO [facility] [place], treated with Eliquis, etiology for the blood clots is undetermined at this point,===VELE ==NEG MARCH 7, 2025, initial chest x-ray showed no active disease====== shortness of breath improved considerably after starting anticoagulation,    UTI recurrent treatment ongoing March 7, 2025 at [facility] ==and then upon transfer at [place], CT abdomen pelvis currently pending April 2025 and previous CT September 7,  2024, showing urinary bladder wall thickening no kidney stones renal cyst and hepatic cyst noted consider suppressive therapy with Macrodantin 50 mg daily, refer second opinion urology Dr. Dimitri Shah first urology, April 10, 2025    DELANEY , HYPOTENSION FEB 3, 2025 AND DX WITH DEHYDRATION AND FLU A  RX WITH TAMIFLU-- ALSO DX WITH UTI AND RX WITH KEFLEX PER ER.     cystoscopy with ureterotomy August 28, 2024 post procedure had a catheter in for about 10 days when they took that out he developed chills sepsis fever and UTI with acute kidney injury had to go back in the hospital with IV antibiotics and was sent home on oral Levaquin and just finished that course around September 16, 2024, resolved as of October 14, 2024    Annual wellness visit questionnaire completed June 11, 2024,    Shortness of breath upper back and neck and chest tightness, == had labs October 7, 2024 proBNP 180, continues hydrochlorothiazide 25 mg daily---nucl, stress test ==normal nov 4, 2024,  , echo-- mod diastolic dys, nl lv , aortic valve sclerosis,      Hematuria/ dysuria --patient does follow-up with Dr. Santana urology,  CT abdomen and pelvis March 4, 2024,, mild bladder wall thickening and haziness of the perivesicular fat suggesting possible cystitis there is some haziness around the prostate gland suggesting possible prostatitis there was some renal and hepatic cysts colonic diverticulosis a slight irregularity to the abdominal aorta 2.9 cm but no kidney stones or solid renal masses-----had cystoscopy April 18, 2024==back in hospital aug 28, 2024 as above     Ckd 3a     Cad, cath, --no stents, med mgt.=== Dr harvey, ---cont ranexa 500 mg bid , crestor 20 mg qd , coq -10     Copd--ct chest low dose , normal march 2023,,, CT chest low-dose screening April 19, 2024 shows mild emphysema no active airways disease aortic and severe calcified coronary atherosclerotic disease otherwise negative, recommend 1 year follow-up  --------------------------------continues trelegy daily , prn albuterol hfa,   ----chest x-ray January 15, 2024 shows blunting posterior costophrenic sulcus on the left, shadows in the lower lungs could relate to cardiogenic cardiophrenic fat pads degenerative changes of the dorsal spine otherwise    Lower extremity edema 2-3+,, now worsening up to 3-4+ on the left leg compared to the right checking a venous evaluation were going to start torsemide 20 mg daily hydrochlorothiazide will be restarted and repeat labs in 1 month Carley 10, 2025    Pulm htn --cont ?      Htn cont telmisartan 80 mg qd , hctz 25 mg qd recommend restarting, adding torsemide 20 mg daily with repeat labs in 1 month Carley 10, 2025,    Bph, --does see urology    Dm 2 , cont  metformin extended release 500 mg daily ==hga1c = 6.4 October 7, 2024, urine microalbumin was - neg ---January 17, 2024-====off glipizide also March 17, 2025    Overwgt , == off steroids   now ,      Vit d def mild jan 2024     Psa 1.8, jan 2024                    Follow Up   Return for Next scheduled follow up.  Patient was given instructions and counseling regarding his condition or for health maintenance advice. Please see specific information pulled into the AVS if appropriate.

## 2025-06-11 ENCOUNTER — HOSPITAL ENCOUNTER (OUTPATIENT)
Dept: CARDIOLOGY | Facility: HOSPITAL | Age: 80
Discharge: HOME | End: 2025-06-11
Admitting: INTERNAL MEDICINE
Payer: MEDICARE

## 2025-06-11 DIAGNOSIS — I26.99 OTHER ACUTE PULMONARY EMBOLISM WITHOUT ACUTE COR PULMONALE: ICD-10-CM

## 2025-06-11 DIAGNOSIS — I10 ESSENTIAL HYPERTENSION: ICD-10-CM

## 2025-06-11 DIAGNOSIS — I50.32 CHRONIC DIASTOLIC (CONGESTIVE) HEART FAILURE: ICD-10-CM

## 2025-06-11 DIAGNOSIS — R06.09 DYSPNEA ON EXERTION: ICD-10-CM

## 2025-06-11 DIAGNOSIS — N18.31 STAGE 3A CHRONIC KIDNEY DISEASE: ICD-10-CM

## 2025-06-11 PROCEDURE — 93970 EXTREMITY STUDY: CPT

## 2025-06-12 LAB
BH CV LOWER VASCULAR LEFT COMMON FEMORAL AUGMENT: NORMAL
BH CV LOWER VASCULAR LEFT COMMON FEMORAL COMPETENT: NORMAL
BH CV LOWER VASCULAR LEFT COMMON FEMORAL COMPRESS: NORMAL
BH CV LOWER VASCULAR LEFT COMMON FEMORAL PHASIC: NORMAL
BH CV LOWER VASCULAR LEFT COMMON FEMORAL SPONT: NORMAL
BH CV LOWER VASCULAR LEFT DISTAL FEMORAL COMPRESS: NORMAL
BH CV LOWER VASCULAR LEFT GASTRONEMIUS COMPRESS: NORMAL
BH CV LOWER VASCULAR LEFT GREATER SAPH AK COMPRESS: NORMAL
BH CV LOWER VASCULAR LEFT GREATER SAPH BK COMPRESS: NORMAL
BH CV LOWER VASCULAR LEFT LESSER SAPH COMPRESS: NORMAL
BH CV LOWER VASCULAR LEFT MID FEMORAL AUGMENT: NORMAL
BH CV LOWER VASCULAR LEFT MID FEMORAL COMPETENT: NORMAL
BH CV LOWER VASCULAR LEFT MID FEMORAL COMPRESS: NORMAL
BH CV LOWER VASCULAR LEFT MID FEMORAL PHASIC: NORMAL
BH CV LOWER VASCULAR LEFT MID FEMORAL SPONT: NORMAL
BH CV LOWER VASCULAR LEFT PERONEAL AUGMENT: NORMAL
BH CV LOWER VASCULAR LEFT PERONEAL COMPETENT: NORMAL
BH CV LOWER VASCULAR LEFT PERONEAL COMPRESS: NORMAL
BH CV LOWER VASCULAR LEFT PERONEAL PHASIC: NORMAL
BH CV LOWER VASCULAR LEFT PERONEAL SPONT: NORMAL
BH CV LOWER VASCULAR LEFT POPLITEAL AUGMENT: NORMAL
BH CV LOWER VASCULAR LEFT POPLITEAL COMPETENT: NORMAL
BH CV LOWER VASCULAR LEFT POPLITEAL COMPRESS: NORMAL
BH CV LOWER VASCULAR LEFT POPLITEAL PHASIC: NORMAL
BH CV LOWER VASCULAR LEFT POPLITEAL SPONT: NORMAL
BH CV LOWER VASCULAR LEFT POSTERIOR TIBIAL AUGMENT: NORMAL
BH CV LOWER VASCULAR LEFT POSTERIOR TIBIAL COMPETENT: NORMAL
BH CV LOWER VASCULAR LEFT POSTERIOR TIBIAL COMPRESS: NORMAL
BH CV LOWER VASCULAR LEFT POSTERIOR TIBIAL PHASIC: NORMAL
BH CV LOWER VASCULAR LEFT POSTERIOR TIBIAL SPONT: NORMAL
BH CV LOWER VASCULAR LEFT PROFUNDA FEMORAL COMPRESS: NORMAL
BH CV LOWER VASCULAR LEFT PROXIMAL FEMORAL COMPRESS: NORMAL
BH CV LOWER VASCULAR LEFT SAPHENOFEMORAL JUNCTION COMPRESS: NORMAL
BH CV LOWER VASCULAR RIGHT COMMON FEMORAL AUGMENT: NORMAL
BH CV LOWER VASCULAR RIGHT COMMON FEMORAL COMPETENT: NORMAL
BH CV LOWER VASCULAR RIGHT COMMON FEMORAL COMPRESS: NORMAL
BH CV LOWER VASCULAR RIGHT COMMON FEMORAL PHASIC: NORMAL
BH CV LOWER VASCULAR RIGHT COMMON FEMORAL SPONT: NORMAL
BH CV LOWER VASCULAR RIGHT DISTAL FEMORAL COMPRESS: NORMAL
BH CV LOWER VASCULAR RIGHT GASTRONEMIUS COMPRESS: NORMAL
BH CV LOWER VASCULAR RIGHT GREATER SAPH AK COMPRESS: NORMAL
BH CV LOWER VASCULAR RIGHT GREATER SAPH BK COMPRESS: NORMAL
BH CV LOWER VASCULAR RIGHT LESSER SAPH COMPRESS: NORMAL
BH CV LOWER VASCULAR RIGHT MID FEMORAL AUGMENT: NORMAL
BH CV LOWER VASCULAR RIGHT MID FEMORAL COMPETENT: NORMAL
BH CV LOWER VASCULAR RIGHT MID FEMORAL COMPRESS: NORMAL
BH CV LOWER VASCULAR RIGHT MID FEMORAL PHASIC: NORMAL
BH CV LOWER VASCULAR RIGHT MID FEMORAL SPONT: NORMAL
BH CV LOWER VASCULAR RIGHT PERONEAL AUGMENT: NORMAL
BH CV LOWER VASCULAR RIGHT PERONEAL COMPETENT: NORMAL
BH CV LOWER VASCULAR RIGHT PERONEAL COMPRESS: NORMAL
BH CV LOWER VASCULAR RIGHT PERONEAL PHASIC: NORMAL
BH CV LOWER VASCULAR RIGHT PERONEAL SPONT: NORMAL
BH CV LOWER VASCULAR RIGHT POPLITEAL AUGMENT: NORMAL
BH CV LOWER VASCULAR RIGHT POPLITEAL COMPETENT: NORMAL
BH CV LOWER VASCULAR RIGHT POPLITEAL COMPRESS: NORMAL
BH CV LOWER VASCULAR RIGHT POPLITEAL PHASIC: NORMAL
BH CV LOWER VASCULAR RIGHT POPLITEAL SPONT: NORMAL
BH CV LOWER VASCULAR RIGHT POSTERIOR TIBIAL AUGMENT: NORMAL
BH CV LOWER VASCULAR RIGHT POSTERIOR TIBIAL COMPETENT: NORMAL
BH CV LOWER VASCULAR RIGHT POSTERIOR TIBIAL COMPRESS: NORMAL
BH CV LOWER VASCULAR RIGHT POSTERIOR TIBIAL PHASIC: NORMAL
BH CV LOWER VASCULAR RIGHT POSTERIOR TIBIAL SPONT: NORMAL
BH CV LOWER VASCULAR RIGHT PROFUNDA FEMORAL COMPRESS: NORMAL
BH CV LOWER VASCULAR RIGHT PROXIMAL FEMORAL COMPRESS: NORMAL
BH CV LOWER VASCULAR RIGHT SAPHENOFEMORAL JUNCTION COMPRESS: NORMAL

## 2025-07-11 ENCOUNTER — TELEPHONE (OUTPATIENT)
Dept: UROLOGY | Facility: CLINIC | Age: 80
End: 2025-07-11
Payer: MEDICARE

## 2025-07-11 NOTE — TELEPHONE ENCOUNTER
PT CX'D HIS APPT WITH [facility] FOR 6/30/25, CALLED AND SPOKE TO PT AND HE STATED THAT HE DID NOT WISH TO RS AT THIS TIME AND THAT HE WOULD CALL US BACK WHEN HE IS READY TO SCHEDULE, ANYTHING ELSE TO DO?

## 2025-07-14 ENCOUNTER — OFFICE VISIT (OUTPATIENT)
Dept: INTERNAL MEDICINE | Age: 80
End: 2025-07-14
Payer: MEDICARE

## 2025-07-14 VITALS
TEMPERATURE: 98.4 F | HEIGHT: 74 IN | WEIGHT: 280 LBS | BODY MASS INDEX: 35.94 KG/M2 | HEART RATE: 67 BPM | SYSTOLIC BLOOD PRESSURE: 138 MMHG | DIASTOLIC BLOOD PRESSURE: 84 MMHG | OXYGEN SATURATION: 95 %

## 2025-07-14 DIAGNOSIS — Z00.00 MEDICARE ANNUAL WELLNESS VISIT, SUBSEQUENT: Primary | ICD-10-CM

## 2025-07-14 DIAGNOSIS — N35.914 STRICTURE OF ANTERIOR URETHRA IN MALE, UNSPECIFIED STRICTURE TYPE: ICD-10-CM

## 2025-07-14 DIAGNOSIS — Z12.5 SCREENING PSA (PROSTATE SPECIFIC ANTIGEN): ICD-10-CM

## 2025-07-14 DIAGNOSIS — E78.2 MIXED HYPERLIPIDEMIA: ICD-10-CM

## 2025-07-14 DIAGNOSIS — N40.1 BENIGN PROSTATIC HYPERPLASIA WITH URINARY FREQUENCY: ICD-10-CM

## 2025-07-14 DIAGNOSIS — R97.20 ELEVATED PSA: ICD-10-CM

## 2025-07-14 DIAGNOSIS — N18.31 STAGE 3A CHRONIC KIDNEY DISEASE: ICD-10-CM

## 2025-07-14 DIAGNOSIS — R06.09 DYSPNEA ON EXERTION: ICD-10-CM

## 2025-07-14 DIAGNOSIS — I10 ESSENTIAL HYPERTENSION: ICD-10-CM

## 2025-07-14 DIAGNOSIS — I50.32 CHRONIC DIASTOLIC (CONGESTIVE) HEART FAILURE: ICD-10-CM

## 2025-07-14 DIAGNOSIS — E55.9 VITAMIN D DEFICIENCY: ICD-10-CM

## 2025-07-14 DIAGNOSIS — I25.10 CORONARY ARTERY DISEASE INVOLVING NATIVE CORONARY ARTERY OF NATIVE HEART WITHOUT ANGINA PECTORIS: ICD-10-CM

## 2025-07-14 DIAGNOSIS — E11.9 TYPE 2 DIABETES MELLITUS TREATED WITHOUT INSULIN: ICD-10-CM

## 2025-07-14 DIAGNOSIS — R35.0 BENIGN PROSTATIC HYPERPLASIA WITH URINARY FREQUENCY: ICD-10-CM

## 2025-07-14 PROCEDURE — G0439 PPPS, SUBSEQ VISIT: HCPCS | Performed by: INTERNAL MEDICINE

## 2025-07-14 PROCEDURE — G2211 COMPLEX E/M VISIT ADD ON: HCPCS | Performed by: INTERNAL MEDICINE

## 2025-07-14 PROCEDURE — 1126F AMNT PAIN NOTED NONE PRSNT: CPT | Performed by: INTERNAL MEDICINE

## 2025-07-14 PROCEDURE — 3079F DIAST BP 80-89 MM HG: CPT | Performed by: INTERNAL MEDICINE

## 2025-07-14 PROCEDURE — 99214 OFFICE O/P EST MOD 30 MIN: CPT | Performed by: INTERNAL MEDICINE

## 2025-07-14 PROCEDURE — 96160 PT-FOCUSED HLTH RISK ASSMT: CPT | Performed by: INTERNAL MEDICINE

## 2025-07-14 PROCEDURE — 1170F FXNL STATUS ASSESSED: CPT | Performed by: INTERNAL MEDICINE

## 2025-07-14 PROCEDURE — 3075F SYST BP GE 130 - 139MM HG: CPT | Performed by: INTERNAL MEDICINE

## 2025-07-14 RX ORDER — SILDENAFIL 100 MG/1
100 TABLET, FILM COATED ORAL AS NEEDED
COMMUNITY
Start: 2025-07-01

## 2025-07-14 NOTE — PROGRESS NOTES
Subjective   The ABCs of the Annual Wellness Visit  Medicare Wellness Visit      Kennedy Lin is a 79 y.o. patient who presents for a Medicare Wellness Visit.    The following portions of the patient's history were reviewed and   updated as appropriate: allergies, current medications, past family history, past medical history, past social history, past surgical history, and problem list.    Compared to one year ago, the patient's physical   health is the same.  Compared to one year ago, the patient's mental   health is the same.    Recent Hospitalizations:  He was admitted within the past 365 days at [facility].     Current Medical Providers:  Patient Care Team:  César Kumar MD as PCP - General (Internal Medicine)  Moe Ko MD as Consulting Physician (Pulmonary Disease)  Radha Monteiro MD as Consulting Physician (Urology)    Outpatient Medications Prior to Visit   Medication Sig Dispense Refill    albuterol sulfate  (90 Base) MCG/ACT inhaler Inhale 2 puffs Every 6 (Six) Hours As Needed for Wheezing or Shortness of Air. 18 g 5    apixaban (ELIQUIS) 5 MG tablet tablet Take 1 tablet by mouth 2 (Two) Times a Day. 60 tablet 5    aspirin 81 MG chewable tablet Chew 1 tablet Daily. If urine clearing and not passing excessive clot      Fluticasone-Umeclidin-Vilant (Trelegy Ellipta) 200-62.5-25 MCG/ACT inhaler Inhale 1 puff Daily. Take 1 puff daily 3 each 5    hydroCHLOROthiazide 25 MG tablet Take 1 tablet by mouth Daily. 90 tablet 3    metFORMIN ER (GLUCOPHAGE-XR) 500 MG 24 hr tablet TAKE 1 TABLET BY MOUTH EVERY MORNING WITH BREAKFAST 90 tablet 3    potassium chloride 10 MEQ CR tablet Take 1 tablet by mouth Daily. 30 tablet 5    rosuvastatin (CRESTOR) 20 MG tablet Take 1 tablet by mouth every night at bedtime. 90 tablet 3    sildenafil (VIAGRA) 100 MG tablet Take 1 tablet by mouth As Needed for Erectile Dysfunction. TAKE 1/2 TO 1 TABLET BY MOUTH BY MOUTH ONCE  DAILY FOR erectile dysfunction      Symbicort 160-4.5 MCG/ACT inhaler Inhale 2 puffs 2 (Two) Times a Day.      telmisartan (MICARDIS) 80 MG tablet TAKE 1 TABLET BY MOUTH ONCE DAILY **due FOR office visit FOR further refills** 90 tablet 3    torsemide (DEMADEX) 20 MG tablet Take 1 tablet by mouth Daily. 30 tablet 5    FeroSul 325 (65 Fe) MG tablet TAKE 1 TABLET BY MOUTH 3 times WEEKLY ON monday, WEDNESDAY AND FRIDAY FOR 30 DAYS. (Patient not taking: Reported on 7/14/2025)       No facility-administered medications prior to visit.     No opioid medication identified on active medication list. I have reviewed chart for other potential  high risk medication/s and harmful drug interactions in the elderly.      Aspirin is on active medication list. Aspirin use is indicated based on review of current medical condition/s. Pros and cons of this therapy have been discussed today. Benefits of this medication outweigh potential harm.  Patient has been encouraged to continue taking this medication.  .      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  ACP discussion was held with the patient during this visit. Patient has an advance directive  "(not in EMR), copy requested. Patient does not have an advance directive, information provided.            Objective   Vitals:    25 1145   BP: 138/84   BP Location: Left arm   Patient Position: Sitting   Cuff Size: Adult   Pulse: 67   Temp: 98.4 °F (36.9 °C)   TempSrc: Skin   SpO2: 95%   Weight: 127 kg (280 lb)   Height: 188 cm (74\")   PainSc: 0-No pain       Estimated body mass index is 35.95 kg/m² as calculated from the following:    Height as of this encounter: 188 cm (74\").    Weight as of this encounter: 127 kg (280 lb).    Class 2 Severe Obesity (BMI >=35 and <=39.9). Obesity-related health conditions include the following: hypertension and coronary heart disease. Obesity is unchanged. BMI is is above average; BMI management plan is completed. We discussed portion control and increasing exercise.           Does the patient have evidence of cognitive impairment? No                                                                                               Health  Risk Assessment    Smoking Status:  Social History     Tobacco Use   Smoking Status Former    Current packs/day: 0.00    Average packs/day: 1.5 packs/day for 20.6 years (30.9 ttl pk-yrs)    Types: Cigarettes    Start date: 1965    Quit date: 3/23/1986    Years since quittin.3    Passive exposure: Past   Smokeless Tobacco Current    Types: Chew   Tobacco Comments    INST PER ANESTHESIA PROTOCOL-not within the last 24 hours     Alcohol Consumption:  Social History     Substance and Sexual Activity   Alcohol Use Yes    Alcohol/week: 1.0 standard drink of alcohol    Types: 1 Cans of beer per week    Comment: occ       Fall Risk Screen  STEADI Fall Risk Assessment was completed, and patient is at LOW risk for falls.Assessment completed on:2025    Depression Screening   Little interest or pleasure in doing things? Not at all   Feeling down, depressed, or hopeless? Not at all   PHQ-2 Total Score 0      Health Habits and Functional and " Cognitive Screenin/14/2025    11:43 AM   Functional & Cognitive Status   Do you have difficulty preparing food and eating? No   Do you have difficulty bathing yourself, getting dressed or grooming yourself? No   Do you have difficulty using the toilet? No   Do you have difficulty moving around from place to place? No   Do you have trouble with steps or getting out of a bed or a chair? No   Current Diet Well Balanced Diet   Dental Exam Up to date   Eye Exam Up to date   Exercise (times per week) 7 times per week   Current Exercises Include Yard Work;House Cleaning;Other   Do you need help using the phone?  No   Are you deaf or do you have serious difficulty hearing?  Yes   Do you need help to go to places out of walking distance? No   Do you need help shopping? No   Do you need help preparing meals?  No   Do you need help with housework?  No   Do you need help with laundry? No   Do you need help taking your medications? No   Do you need help managing money? No   Do you ever drive or ride in a car without wearing a seat belt? No   Have you felt unusual fatigue (could be tiredness), stress, anger or loneliness in the last month? No   Who do you live with? Spouse   If you need help, do you have trouble finding someone available to you? No   Have you been bothered in the last four weeks by sexual problems? No   Do you have difficulty concentrating, remembering or making decisions? No           Age-appropriate Screening Schedule:  Refer to the list below for future screening recommendations based on patient's age, sex and/or medical conditions. Orders for these recommended tests are listed in the plan section. The patient has been provided with a written plan.    Health Maintenance List  Health Maintenance   Topic Date Due    DIABETIC FOOT EXAM  Never done    DIABETIC EYE EXAM  Never done    TDAP/TD VACCINES (1 - Tdap) Never done    COLORECTAL CANCER SCREENING  Never done    ZOSTER VACCINE (1 of 2) Never done     RSV Vaccine - Adults (1 - 1-dose 75+ series) Never done    HEMOGLOBIN A1C  09/24/2025    Pneumococcal Vaccine 50+ (1 of 2 - PCV) 09/20/2025 (Originally 8/23/1964)    COVID-19 Vaccine (1 - 2024-25 season) 12/31/2025 (Originally 9/1/2024)    INFLUENZA VACCINE  10/01/2025    LIPID PANEL  03/24/2026    URINE MICROALBUMIN-CREATININE RATIO (uACR)  03/24/2026    ANNUAL WELLNESS VISIT  07/14/2026    HEPATITIS C SCREENING  Completed                                                                                                                                                CMS Preventative Services Quick Reference  Risk Factors Identified During Encounter  None Identified    The above risks/problems have been discussed with the patient.  Pertinent information has been shared with the patient in the After Visit Summary.  An After Visit Summary and PPPS were made available to the patient.    Follow Up:   Next Medicare Wellness visit to be scheduled in 1 year.     Assessment & Plan  Medicare annual wellness visit, subsequent         Essential hypertension  Hypertension is stable and controlled  Continue current treatment regimen.  Blood pressure will be reassessed in 6 months.         Coronary artery disease (moderate per cath but not stented)   Coronary Artery Disease (OPTIONAL): Coronary artery disease is stable.  Continue current treatment regimen.  Cardiac status will be reassessed in 6 months.         Dyspnea on exertion         Type 2 diabetes mellitus treated without insulin  Diabetes is stable.   Continue current treatment regimen.  Diabetes will be reassessed in 6 months         Elevated PSA         Stage 3a chronic kidney disease  Renal condition is stable.  Continue current treatment regimen.  Renal condition will be reassessed in 6 months.         Benign prostatic hyperplasia with urinary frequency         Mixed hyperlipidemia   Lipid abnormalities are stable    Plan:  Continue same medication/s without change.       Discussed medication dosage, use, side effects, and goals of treatment in detail.    Counseled patient on lifestyle modifications to help control hyperlipidemia.     Patient Treatment Goals:   LDL goal is less than 70    Followup in 6 months.         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

## 2025-07-14 NOTE — PROGRESS NOTES
"Chief Complaint   Patient presents with    Medicare Wellness-subsequent     80 yo male presents for annual wellness visit. Pt has not had labs. No new issues.    Congestive Heart Failure        Objective   Vital Signs  Vitals:    07/14/25 1145   BP: 138/84   BP Location: Left arm   Patient Position: Sitting   Cuff Size: Adult   Pulse: 67   Temp: 98.4 °F (36.9 °C)   TempSrc: Skin   SpO2: 95%   Weight: 127 kg (280 lb)   Height: 188 cm (74\")      Body mass index is 35.95 kg/m².  Review of Systems   Constitutional: Negative.    HENT: Negative.     Eyes: Negative.    Respiratory:  Positive for shortness of breath.    Cardiovascular: Negative.    Gastrointestinal: Negative.    Endocrine: Negative.    Genitourinary: Negative.    Musculoskeletal: Negative.    Allergic/Immunologic: Negative.    Neurological: Negative.    Hematological: Negative.    Psychiatric/Behavioral: Negative.        Physical Exam  Constitutional:       General: He is not in acute distress.     Appearance: Normal appearance. He is obese.   HENT:      Head: Normocephalic.      Mouth/Throat:      Mouth: Mucous membranes are moist.   Eyes:      Conjunctiva/sclera: Conjunctivae normal.      Pupils: Pupils are equal, round, and reactive to light.   Cardiovascular:      Rate and Rhythm: Normal rate and regular rhythm.      Pulses: Normal pulses.      Heart sounds: Normal heart sounds.   Pulmonary:      Effort: Pulmonary effort is normal.      Breath sounds: Normal breath sounds.   Abdominal:      General: Bowel sounds are normal.      Palpations: Abdomen is soft.   Musculoskeletal:         General: No swelling. Normal range of motion.      Cervical back: Neck supple.      Right lower leg: Edema present.      Left lower leg: Edema present.   Skin:     General: Skin is warm and dry.      Coloration: Skin is not jaundiced.   Neurological:      General: No focal deficit present.      Mental Status: He is alert and oriented to person, place, and time. Mental " status is at baseline.   Psychiatric:         Mood and Affect: Mood normal.         Behavior: Behavior normal.         Thought Content: Thought content normal.         Judgment: Judgment normal.        Result Review :   Lab Results   Component Value Date    PROBNP 149.0 03/24/2025    PROBNP 180.0 10/07/2024    PROBNP 405.0 01/17/2024     CMP          2/3/2025    14:37 3/24/2025    11:13 5/29/2025    13:31   CMP   Glucose 253  188     BUN 35  21     Creatinine 2.51  1.62  1.10    EGFR 25.5  43.2  68.7    Sodium 134  136     Potassium 4.3  4.4     Chloride 96  100     Calcium 9.2  9.8     Total Protein 6.8  6.9     Albumin 3.7  3.6     Globulin 3.1  3.3     Total Bilirubin 1.0  0.6     Alkaline Phosphatase 63  69     AST (SGOT) 11  13     ALT (SGPT) 22  13     Albumin/Globulin Ratio 1.2  1.1     BUN/Creatinine Ratio 13.9  13.0     Anion Gap 12.7  8.3       CBC w/diff          10/7/2024    10:32 2/3/2025    14:37 3/24/2025    11:13   CBC w/Diff   WBC 7.89  11.85  6.15    RBC 4.36  4.55  3.92    Hemoglobin 13.3  14.1  11.8    Hematocrit 41.7  42.1  37.2    MCV 95.6  92.5  94.9    MCH 30.5  31.0  30.1    MCHC 31.9  33.5  31.7    RDW 13.0  13.1  13.5    Platelets 150  215  223    Neutrophil Rel % 55.5  72.2  57.8    Immature Granulocyte Rel % 0.1  1.9  0.3    Lymphocyte Rel % 28.6  11.8  27.5    Monocyte Rel % 11.0  13.1  11.2    Eosinophil Rel % 4.4  0.4  2.9    Basophil Rel % 0.4  0.6  0.3       Lipid Panel          10/7/2024    10:32 3/24/2025    11:13   Lipid Panel   Total Cholesterol 89  104    Triglycerides 100  228    HDL Cholesterol 31  29    VLDL Cholesterol 19  36    LDL Cholesterol  39  39    LDL/HDL Ratio 1.23  1.01       Lab Results   Component Value Date    TSH 4.200 06/11/2024    TSH 1.220 01/17/2024    TSH 1.750 04/28/2023      Lab Results   Component Value Date    FREET4 1.11 06/11/2024    FREET4 1.09 01/17/2024      A1C Last 3 Results          10/7/2024    10:32 3/24/2025    11:13   HGBA1C Last 3 Results    Hemoglobin A1C 6.40  8.50       PSA          3/24/2025    11:13   PSA   PSA 3.100                     Visit Diagnoses:    ICD-10-CM ICD-9-CM   1. Medicare annual wellness visit, subsequent  Z00.00 V70.0   2. Essential hypertension  I10 401.9   3. Coronary artery disease (moderate per cath but not stented)   I25.10 414.01   4. Dyspnea on exertion  R06.09 786.09   5. Type 2 diabetes mellitus treated without insulin  E11.9 250.00   6. Elevated PSA  R97.20 790.93   7. Stage 3a chronic kidney disease  N18.31 585.3   8. Benign prostatic hyperplasia with urinary frequency  N40.1 600.01    R35.0 788.41   9. Mixed hyperlipidemia  E78.2 272.2   10. Chronic diastolic (congestive) heart failure  I50.32 428.32     428.0   11. Vitamin D deficiency  E55.9 268.9   12. 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 Screening PSA (prostate specific antigen)  Z12.5 V76.44       Assessment and Plan   Diagnoses and all orders for this visit:    1. Medicare annual wellness visit, subsequent (Primary)  Assessment & Plan:           Orders:  -     Hemoglobin A1c; Future  -     Comprehensive Metabolic Panel; Future  -     CBC & Differential; Future  -     proBNP; Future  -     Magnesium; Future  -     Lipid Panel; Future  -     PSA Screen; Future  -     Microalbumin / Creatinine Urine Ratio - Urine, Clean Catch; Future    2. Essential hypertension  Assessment & Plan:  Hypertension is stable and controlled  Continue current treatment regimen.  Blood pressure will be reassessed in 6 months.           Orders:  -     Hemoglobin A1c; Future  -     Comprehensive Metabolic Panel; Future  -     CBC & Differential; Future  -     proBNP; Future  -     Magnesium; Future  -     Lipid Panel; Future  -     PSA Screen; Future  -     Microalbumin / Creatinine Urine Ratio - Urine, Clean Catch; Future    3. Coronary artery disease (moderate per cath but not stented)   Assessment & Plan:  Coronary Artery Disease  (OPTIONAL): Coronary artery disease is stable.  Continue current treatment regimen.  Cardiac status will be reassessed in 6 months.           Orders:  -     Hemoglobin A1c; Future  -     Comprehensive Metabolic Panel; Future  -     CBC & Differential; Future  -     proBNP; Future  -     Magnesium; Future  -     Lipid Panel; Future  -     PSA Screen; Future  -     Microalbumin / Creatinine Urine Ratio - Urine, Clean Catch; Future    4. Dyspnea on exertion  Assessment & Plan:           Orders:  -     Hemoglobin A1c; Future  -     Comprehensive Metabolic Panel; Future  -     CBC & Differential; Future  -     proBNP; Future  -     Magnesium; Future  -     Lipid Panel; Future  -     PSA Screen; Future  -     Microalbumin / Creatinine Urine Ratio - Urine, Clean Catch; Future    5. Type 2 diabetes mellitus treated without insulin  Assessment & Plan:  Diabetes is stable.   Continue current treatment regimen.  Diabetes will be reassessed in 6 months           Orders:  -     Hemoglobin A1c; Future  -     Comprehensive Metabolic Panel; Future  -     CBC & Differential; Future  -     proBNP; Future  -     Magnesium; Future  -     Lipid Panel; Future  -     PSA Screen; Future  -     Microalbumin / Creatinine Urine Ratio - Urine, Clean Catch; Future    6. Elevated PSA  Assessment & Plan:           Orders:  -     Hemoglobin A1c; Future  -     Comprehensive Metabolic Panel; Future  -     CBC & Differential; Future  -     proBNP; Future  -     Magnesium; Future  -     Lipid Panel; Future  -     PSA Screen; Future  -     Microalbumin / Creatinine Urine Ratio - Urine, Clean Catch; Future    7. Stage 3a chronic kidney disease  Assessment & Plan:  Renal condition is stable.  Continue current treatment regimen.  Renal condition will be reassessed in 6 months.           Orders:  -     Hemoglobin A1c; Future  -     Comprehensive Metabolic Panel; Future  -     CBC & Differential; Future  -     proBNP; Future  -     Magnesium; Future  -      Lipid Panel; Future  -     PSA Screen; Future  -     Microalbumin / Creatinine Urine Ratio - Urine, Clean Catch; Future    8. Benign prostatic hyperplasia with urinary frequency  Assessment & Plan:           Orders:  -     Hemoglobin A1c; Future  -     Comprehensive Metabolic Panel; Future  -     CBC & Differential; Future  -     proBNP; Future  -     Magnesium; Future  -     Lipid Panel; Future  -     PSA Screen; Future  -     Microalbumin / Creatinine Urine Ratio - Urine, Clean Catch; Future    9. Mixed hyperlipidemia  Assessment & Plan:   Lipid abnormalities are stable    Plan:  Continue same medication/s without change.      Discussed medication dosage, use, side effects, and goals of treatment in detail.    Counseled patient on lifestyle modifications to help control hyperlipidemia.     Patient Treatment Goals:   LDL goal is less than 70    Followup in 6 months.           Orders:  -     Hemoglobin A1c; Future  -     Comprehensive Metabolic Panel; Future  -     CBC & Differential; Future  -     proBNP; Future  -     Magnesium; Future  -     Lipid Panel; Future  -     PSA Screen; Future  -     Microalbumin / Creatinine Urine Ratio - Urine, Clean Catch; Future    10. Chronic diastolic (congestive) heart failure  Assessment & Plan:                 Orders:  -     Hemoglobin A1c; Future  -     Comprehensive Metabolic Panel; Future  -     CBC & Differential; Future  -     proBNP; Future  -     Magnesium; Future  -     Lipid Panel; Future  -     PSA Screen; Future  -     Microalbumin / Creatinine Urine Ratio - Urine, Clean Catch; Future    11. Vitamin D deficiency  Assessment & Plan:           Orders:  -     Hemoglobin A1c; Future  -     Comprehensive Metabolic Panel; Future  -     CBC & Differential; Future  -     proBNP; Future  -     Magnesium; Future  -     Lipid Panel; Future  -     PSA Screen; Future  -     Microalbumin / Creatinine Urine Ratio - Urine, Clean Catch; Future    12. Stricture of anterior urethra in  male, unspecified stricture type  Assessment & Plan:           Orders:  -     Hemoglobin A1c; Future  -     Comprehensive Metabolic Panel; Future  -     CBC & Differential; Future  -     proBNP; Future  -     Magnesium; Future  -     Lipid Panel; Future  -     PSA Screen; Future  -     Microalbumin / Creatinine Urine Ratio - Urine, Clean Catch; Future    13. Screening PSA (prostate specific antigen)  -     Hemoglobin A1c; Future  -     Comprehensive Metabolic Panel; Future  -     CBC & Differential; Future  -     proBNP; Future  -     Magnesium; Future  -     Lipid Panel; Future  -     PSA Screen; Future  -     Microalbumin / Creatinine Urine Ratio - Urine, Clean Catch; Future        Medicare wellness pleated July 14, 2025    Lower extremity swelling edema, worsening specially left leg ===venous evaluation June 11, 2025 shows no evidence of DVT normal bilateral lower leg venous study, ----continues torsemide 20 mg daily, along with HCTZ to take 1 hour before he takes the torsemide every day, he is to elevate his feet        Sepsis, UTI recurrent, E. 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 was noted,    Acute PE, mainly right lung with apparent right heart strain multiple emboli, =====ADMITTED TO [facility] [place], treated with Eliquis, etiology for the blood clots is undetermined at this point,===VELE ==NEG MARCH 7, 2025, initial chest x-ray showed no active disease====== shortness of breath improved considerably--- continue Eliquis 5 mg twice a day discussion about continuing Eliquis indefinitely at even a maintenance dose of 2.5 twice a day after 9 months to 12 months of full anticoagulation risks of recurrent PE DVT  discussed with patient July 14, 2025    UTI recurrent treatment ongoing March 7, 2025 at [facility] ==and then upon transfer at [place], CT abdomen pelvis currently pending April 2025 and previous CT September 7, 2024, showing urinary bladder wall thickening no kidney stones renal cyst and hepatic cyst noted consider suppressive therapy with Macrodantin 50 mg daily, refer second opinion urology Dr. Dimitri Shah first urology, April 10, 2025    DELANEY , HYPOTENSION FEB 3, 2025 AND DX WITH DEHYDRATION AND FLU A  RX WITH TAMIFLU-- ALSO DX WITH UTI AND RX WITH KEFLEX PER ER.     cystoscopy with ureterotomy August 28, 2024 post procedure had a catheter in for about 10 days when they took that out he developed chills sepsis fever and UTI with acute kidney injury had to go back in the hospital with IV antibiotics and was sent home on oral Levaquin and just finished that course around September 16, 2024, resolved as of October 14, 2024    Shortness of breath upper back and neck and chest tightness, == had labs October 7, 2024 proBNP 180, continues hydrochlorothiazide 25 mg daily---nucl, stress test ==normal nov 4, 2024,  , echo-- mod diastolic dys, nl lv , aortic valve sclerosis,      Hematuria/ dysuria --patient does follow-up with Dr. Santana urology,  CT abdomen and pelvis March 4, 2024,, mild bladder wall thickening and haziness of the perivesicular fat suggesting possible cystitis there is some haziness around the prostate gland suggesting possible prostatitis there was some renal and hepatic cysts colonic diverticulosis a slight irregularity to the abdominal aorta 2.9 cm but no kidney stones or solid renal masses-----had cystoscopy April 18, 2024==back in hospital aug 28, 2024 as above     Ckd 3a     Cad, cath, --no stents, med mgt.=== Dr harvey, ---cont  crestor 20 mg qd , coq -10     Copd--ct chest low dose , normal march 2023,,, CT chest low-dose screening April 19, 2024 shows mild emphysema no active airways disease  aortic and severe calcified coronary atherosclerotic disease otherwise negative, recommend 1 year follow-up --------------------------------continues trelegy daily , prn albuterol hfa,   ----chest x-ray January 15, 2024 shows blunting posterior costophrenic sulcus on the left, shadows in the lower lungs could relate to cardiogenic cardiophrenic fat pads degenerative changes of the dorsal spine otherwise    Lower extremity edema 2-3+,, now worsening up to 3-4+ on the left leg compared to the right, venous evaluation - June 2025, continues torsemide 20 mg daily, hydrochlorothiazide    Pulm htn --cont ?      Htn cont telmisartan 80 mg qd , hctz 25 mg qd ,  torsemide 20 mg daily, HCTZ     Bph, --does see urology    Dm 2 , cont  metformin extended release 500 mg daily     Overwgt , == off steroids   now ,      Vit d def mild jan 2024     Psa 1.8, jan 2024                  Follow Up   Return in about 6 months (around 1/14/2026).  Patient was given instructions and counseling regarding his condition or for health maintenance advice. Please see specific information pulled into the AVS if appropriate.

## 2025-08-03 DIAGNOSIS — I10 ESSENTIAL (PRIMARY) HYPERTENSION: ICD-10-CM

## 2025-08-04 RX ORDER — TELMISARTAN 80 MG/1
TABLET ORAL
Qty: 90 TABLET | Refills: 3 | OUTPATIENT
Start: 2025-08-04

## 2025-08-11 ENCOUNTER — OFFICE VISIT (OUTPATIENT)
Dept: CARDIOLOGY | Facility: CLINIC | Age: 80
End: 2025-08-11
Payer: MEDICARE

## 2025-08-11 VITALS
BODY MASS INDEX: 36.04 KG/M2 | SYSTOLIC BLOOD PRESSURE: 126 MMHG | WEIGHT: 280.8 LBS | HEART RATE: 69 BPM | DIASTOLIC BLOOD PRESSURE: 76 MMHG | HEIGHT: 74 IN

## 2025-08-11 DIAGNOSIS — I26.99 OTHER PULMONARY EMBOLISM WITHOUT ACUTE COR PULMONALE, UNSPECIFIED CHRONICITY: Primary | ICD-10-CM

## 2025-08-11 PROCEDURE — 99213 OFFICE O/P EST LOW 20 MIN: CPT | Performed by: INTERNAL MEDICINE

## 2025-08-11 PROCEDURE — 3074F SYST BP LT 130 MM HG: CPT | Performed by: INTERNAL MEDICINE

## 2025-08-11 PROCEDURE — G2211 COMPLEX E/M VISIT ADD ON: HCPCS | Performed by: INTERNAL MEDICINE

## 2025-08-11 PROCEDURE — 1160F RVW MEDS BY RX/DR IN RCRD: CPT | Performed by: INTERNAL MEDICINE

## 2025-08-11 PROCEDURE — 3078F DIAST BP <80 MM HG: CPT | Performed by: INTERNAL MEDICINE

## 2025-08-11 PROCEDURE — 1159F MED LIST DOCD IN RCRD: CPT | Performed by: INTERNAL MEDICINE

## 2025-08-11 RX ORDER — NITROFURANTOIN MACROCRYSTALS 50 MG/1
1 CAPSULE ORAL DAILY
COMMUNITY
Start: 2025-07-01

## (undated) DEVICE — SKIN PREP TRAY W/CHG: Brand: MEDLINE INDUSTRIES, INC.

## (undated) DEVICE — GW FC FLOP/TP .035 260CM 3MM

## (undated) DEVICE — NITINOL WIRE WITH HYDROPHILIC TIP: Brand: SENSOR

## (undated) DEVICE — ANGIO-SEAL VIP VASCULAR CLOSURE DEVICE: Brand: ANGIO-SEAL

## (undated) DEVICE — INTRO SHEATH PRELUDE/PRO .035 5F 11X50CM GRY LF

## (undated) DEVICE — CYSTO PACK: Brand: MEDLINE INDUSTRIES, INC.

## (undated) DEVICE — CYSTO/BLADDER IRRIGATION SET, REGULATING CLAMP

## (undated) DEVICE — CATH FOL BARDEX 2WY 22F 30CC

## (undated) DEVICE — CATH F5 INF JL 4 100CM: Brand: INFINITI

## (undated) DEVICE — GLOVE,SURG,SENSICARE SLT,LF,PF,7: Brand: MEDLINE

## (undated) DEVICE — CATH F5 INF JR 4 100CM: Brand: INFINITI

## (undated) DEVICE — CATH F5INF TLPIGST145 110CM6SH: Brand: INFINITI

## (undated) DEVICE — SOL IRRG H2O BG 3000ML STRL

## (undated) DEVICE — RADIFOCUS OPTITORQUE ANGIOGRAPHIC CATHETER: Brand: OPTITORQUE

## (undated) DEVICE — Device

## (undated) DEVICE — TRAY,ADD A CATH,FOLEY,DRAIN BG,10ML SYR: Brand: MEDLINE

## (undated) DEVICE — RADIFOCUS GLIDEWIRE: Brand: GLIDEWIRE

## (undated) DEVICE — GLIDESHEATH SLENDER ACCESS KIT: Brand: GLIDESHEATH SLENDER